# Patient Record
Sex: MALE | Race: BLACK OR AFRICAN AMERICAN | NOT HISPANIC OR LATINO
[De-identification: names, ages, dates, MRNs, and addresses within clinical notes are randomized per-mention and may not be internally consistent; named-entity substitution may affect disease eponyms.]

---

## 2018-03-09 ENCOUNTER — APPOINTMENT (OUTPATIENT)
Dept: CT IMAGING | Facility: IMAGING CENTER | Age: 58
End: 2018-03-09
Payer: COMMERCIAL

## 2018-03-09 ENCOUNTER — OUTPATIENT (OUTPATIENT)
Dept: OUTPATIENT SERVICES | Facility: HOSPITAL | Age: 58
LOS: 1 days | End: 2018-03-09
Payer: COMMERCIAL

## 2018-03-09 DIAGNOSIS — Z00.8 ENCOUNTER FOR OTHER GENERAL EXAMINATION: ICD-10-CM

## 2018-03-09 PROCEDURE — 73200 CT UPPER EXTREMITY W/O DYE: CPT

## 2018-03-09 PROCEDURE — 73200 CT UPPER EXTREMITY W/O DYE: CPT | Mod: 26,LT

## 2020-03-30 ENCOUNTER — INPATIENT (INPATIENT)
Facility: HOSPITAL | Age: 60
LOS: 15 days | Discharge: HOME HEALTH | End: 2020-04-15
Attending: INTERNAL MEDICINE | Admitting: INTERNAL MEDICINE
Payer: COMMERCIAL

## 2020-03-30 VITALS
HEART RATE: 98 BPM | RESPIRATION RATE: 18 BRPM | OXYGEN SATURATION: 98 % | SYSTOLIC BLOOD PRESSURE: 152 MMHG | DIASTOLIC BLOOD PRESSURE: 102 MMHG | TEMPERATURE: 101 F

## 2020-03-30 NOTE — ED ADULT TRIAGE NOTE - CHIEF COMPLAINT QUOTE
Pt arrives to ED via EMS.  Pt arrives on non rebreather on 15L Oxygen by EMS.  Pt moved to nasal cannula in ED and maintaining 95%.  Pt c/o cough and fever.  pt was tested for covid at [facility] and is awaiting results.  Pt called 911 due to SOB.

## 2020-03-31 DIAGNOSIS — B34.9 VIRAL INFECTION, UNSPECIFIED: ICD-10-CM

## 2020-03-31 LAB
ALBUMIN SERPL ELPH-MCNC: 3.6 G/DL — SIGNIFICANT CHANGE UP (ref 3.3–5)
ALP SERPL-CCNC: 34 U/L — LOW (ref 40–120)
ALT FLD-CCNC: 33 U/L — SIGNIFICANT CHANGE UP (ref 4–41)
ANION GAP SERPL CALC-SCNC: 14 MMO/L — SIGNIFICANT CHANGE UP (ref 7–14)
APTT BLD: 30.3 SEC — SIGNIFICANT CHANGE UP (ref 27.5–36.3)
AST SERPL-CCNC: 42 U/L — HIGH (ref 4–40)
BASOPHILS # BLD AUTO: 0.02 K/UL — SIGNIFICANT CHANGE UP (ref 0–0.2)
BASOPHILS NFR BLD AUTO: 0.3 % — SIGNIFICANT CHANGE UP (ref 0–2)
BILIRUB SERPL-MCNC: 0.4 MG/DL — SIGNIFICANT CHANGE UP (ref 0.2–1.2)
BUN SERPL-MCNC: 20 MG/DL — SIGNIFICANT CHANGE UP (ref 7–23)
CALCIUM SERPL-MCNC: 8.3 MG/DL — LOW (ref 8.4–10.5)
CHLORIDE SERPL-SCNC: 91 MMOL/L — LOW (ref 98–107)
CO2 SERPL-SCNC: 27 MMOL/L — SIGNIFICANT CHANGE UP (ref 22–31)
CREAT SERPL-MCNC: 1.57 MG/DL — HIGH (ref 0.5–1.3)
CRP SERPL-MCNC: 157.2 MG/L — HIGH
EOSINOPHIL # BLD AUTO: 0 K/UL — SIGNIFICANT CHANGE UP (ref 0–0.5)
EOSINOPHIL NFR BLD AUTO: 0 % — SIGNIFICANT CHANGE UP (ref 0–6)
FERRITIN SERPL-MCNC: 306.9 NG/ML — SIGNIFICANT CHANGE UP (ref 30–400)
GLUCOSE BLDC GLUCOMTR-MCNC: 100 MG/DL — HIGH (ref 70–99)
GLUCOSE BLDC GLUCOMTR-MCNC: 125 MG/DL — HIGH (ref 70–99)
GLUCOSE BLDC GLUCOMTR-MCNC: 132 MG/DL — HIGH (ref 70–99)
GLUCOSE SERPL-MCNC: 156 MG/DL — HIGH (ref 70–99)
HCT VFR BLD CALC: 39.7 % — SIGNIFICANT CHANGE UP (ref 39–50)
HGB BLD-MCNC: 13.1 G/DL — SIGNIFICANT CHANGE UP (ref 13–17)
IMM GRANULOCYTES NFR BLD AUTO: 0.8 % — SIGNIFICANT CHANGE UP (ref 0–1.5)
INR BLD: 1.04 — SIGNIFICANT CHANGE UP (ref 0.88–1.17)
LYMPHOCYTES # BLD AUTO: 0.61 K/UL — LOW (ref 1–3.3)
LYMPHOCYTES # BLD AUTO: 7.6 % — LOW (ref 13–44)
MCHC RBC-ENTMCNC: 26.7 PG — LOW (ref 27–34)
MCHC RBC-ENTMCNC: 33 % — SIGNIFICANT CHANGE UP (ref 32–36)
MCV RBC AUTO: 80.9 FL — SIGNIFICANT CHANGE UP (ref 80–100)
MONOCYTES # BLD AUTO: 0.54 K/UL — SIGNIFICANT CHANGE UP (ref 0–0.9)
MONOCYTES NFR BLD AUTO: 6.8 % — SIGNIFICANT CHANGE UP (ref 2–14)
NEUTROPHILS # BLD AUTO: 6.75 K/UL — SIGNIFICANT CHANGE UP (ref 1.8–7.4)
NEUTROPHILS NFR BLD AUTO: 84.5 % — HIGH (ref 43–77)
NRBC # FLD: 0 K/UL — SIGNIFICANT CHANGE UP (ref 0–0)
PLATELET # BLD AUTO: 353 K/UL — SIGNIFICANT CHANGE UP (ref 150–400)
PMV BLD: 10 FL — SIGNIFICANT CHANGE UP (ref 7–13)
POTASSIUM SERPL-MCNC: 3.8 MMOL/L — SIGNIFICANT CHANGE UP (ref 3.5–5.3)
POTASSIUM SERPL-SCNC: 3.8 MMOL/L — SIGNIFICANT CHANGE UP (ref 3.5–5.3)
PROT SERPL-MCNC: 7.2 G/DL — SIGNIFICANT CHANGE UP (ref 6–8.3)
PROTHROM AB SERPL-ACNC: 11.9 SEC — SIGNIFICANT CHANGE UP (ref 9.8–13.1)
RBC # BLD: 4.91 M/UL — SIGNIFICANT CHANGE UP (ref 4.2–5.8)
RBC # FLD: 14.3 % — SIGNIFICANT CHANGE UP (ref 10.3–14.5)
SARS-COV-2 RNA SPEC QL NAA+PROBE: DETECTED
SODIUM SERPL-SCNC: 132 MMOL/L — LOW (ref 135–145)
TROPONIN T, HIGH SENSITIVITY: 31 NG/L — SIGNIFICANT CHANGE UP (ref ?–14)
WBC # BLD: 7.98 K/UL — SIGNIFICANT CHANGE UP (ref 3.8–10.5)
WBC # FLD AUTO: 7.98 K/UL — SIGNIFICANT CHANGE UP (ref 3.8–10.5)

## 2020-03-31 PROCEDURE — 71045 X-RAY EXAM CHEST 1 VIEW: CPT | Mod: 26

## 2020-03-31 PROCEDURE — 93010 ELECTROCARDIOGRAM REPORT: CPT

## 2020-03-31 RX ORDER — AMLODIPINE BESYLATE 2.5 MG/1
5 TABLET ORAL DAILY
Refills: 0 | Status: DISCONTINUED | OUTPATIENT
Start: 2020-03-31 | End: 2020-04-01

## 2020-03-31 RX ORDER — DEXTROSE 50 % IN WATER 50 %
12.5 SYRINGE (ML) INTRAVENOUS ONCE
Refills: 0 | Status: DISCONTINUED | OUTPATIENT
Start: 2020-03-31 | End: 2020-04-15

## 2020-03-31 RX ORDER — ASCORBIC ACID 60 MG
1500 TABLET,CHEWABLE ORAL EVERY 6 HOURS
Refills: 0 | Status: COMPLETED | OUTPATIENT
Start: 2020-03-31 | End: 2020-04-04

## 2020-03-31 RX ORDER — AZITHROMYCIN 500 MG/1
500 TABLET, FILM COATED ORAL ONCE
Refills: 0 | Status: COMPLETED | OUTPATIENT
Start: 2020-03-31 | End: 2020-03-31

## 2020-03-31 RX ORDER — ACETAMINOPHEN 500 MG
650 TABLET ORAL EVERY 6 HOURS
Refills: 0 | Status: DISCONTINUED | OUTPATIENT
Start: 2020-03-31 | End: 2020-04-06

## 2020-03-31 RX ORDER — DEXTROSE 50 % IN WATER 50 %
15 SYRINGE (ML) INTRAVENOUS ONCE
Refills: 0 | Status: DISCONTINUED | OUTPATIENT
Start: 2020-03-31 | End: 2020-04-15

## 2020-03-31 RX ORDER — HYDROXYCHLOROQUINE SULFATE 200 MG
400 TABLET ORAL EVERY 12 HOURS
Refills: 0 | Status: COMPLETED | OUTPATIENT
Start: 2020-03-31 | End: 2020-03-31

## 2020-03-31 RX ORDER — GLUCAGON INJECTION, SOLUTION 0.5 MG/.1ML
1 INJECTION, SOLUTION SUBCUTANEOUS ONCE
Refills: 0 | Status: DISCONTINUED | OUTPATIENT
Start: 2020-03-31 | End: 2020-04-15

## 2020-03-31 RX ORDER — ALBUTEROL 90 UG/1
1 AEROSOL, METERED ORAL EVERY 4 HOURS
Refills: 0 | Status: DISCONTINUED | OUTPATIENT
Start: 2020-03-31 | End: 2020-04-15

## 2020-03-31 RX ORDER — DEXTROSE 50 % IN WATER 50 %
25 SYRINGE (ML) INTRAVENOUS ONCE
Refills: 0 | Status: DISCONTINUED | OUTPATIENT
Start: 2020-03-31 | End: 2020-04-15

## 2020-03-31 RX ORDER — SODIUM CHLORIDE 9 MG/ML
1000 INJECTION, SOLUTION INTRAVENOUS
Refills: 0 | Status: DISCONTINUED | OUTPATIENT
Start: 2020-03-31 | End: 2020-04-15

## 2020-03-31 RX ORDER — ASCORBIC ACID 60 MG
1500 TABLET,CHEWABLE ORAL
Refills: 0 | Status: DISCONTINUED | OUTPATIENT
Start: 2020-03-31 | End: 2020-03-31

## 2020-03-31 RX ORDER — INSULIN LISPRO 100/ML
VIAL (ML) SUBCUTANEOUS
Refills: 0 | Status: DISCONTINUED | OUTPATIENT
Start: 2020-03-31 | End: 2020-04-15

## 2020-03-31 RX ORDER — AZITHROMYCIN 500 MG/1
500 TABLET, FILM COATED ORAL ONCE
Refills: 0 | Status: DISCONTINUED | OUTPATIENT
Start: 2020-03-31 | End: 2020-03-31

## 2020-03-31 RX ORDER — INFLUENZA VIRUS VACCINE 15; 15; 15; 15 UG/.5ML; UG/.5ML; UG/.5ML; UG/.5ML
0.5 SUSPENSION INTRAMUSCULAR ONCE
Refills: 0 | Status: DISCONTINUED | OUTPATIENT
Start: 2020-03-31 | End: 2020-04-15

## 2020-03-31 RX ORDER — HEPARIN SODIUM 5000 [USP'U]/ML
5000 INJECTION INTRAVENOUS; SUBCUTANEOUS EVERY 12 HOURS
Refills: 0 | Status: DISCONTINUED | OUTPATIENT
Start: 2020-03-31 | End: 2020-04-03

## 2020-03-31 RX ORDER — ACETAMINOPHEN 500 MG
650 TABLET ORAL ONCE
Refills: 0 | Status: COMPLETED | OUTPATIENT
Start: 2020-03-31 | End: 2020-03-31

## 2020-03-31 RX ORDER — HYDROXYCHLOROQUINE SULFATE 200 MG
200 TABLET ORAL EVERY 12 HOURS
Refills: 0 | Status: COMPLETED | OUTPATIENT
Start: 2020-04-01 | End: 2020-04-04

## 2020-03-31 RX ORDER — HYDROXYCHLOROQUINE SULFATE 200 MG
TABLET ORAL
Refills: 0 | Status: COMPLETED | OUTPATIENT
Start: 2020-03-31 | End: 2020-04-04

## 2020-03-31 RX ORDER — THIAMINE MONONITRATE (VIT B1) 100 MG
200 TABLET ORAL
Refills: 0 | Status: COMPLETED | OUTPATIENT
Start: 2020-03-31 | End: 2020-04-04

## 2020-03-31 RX ORDER — THIAMINE MONONITRATE (VIT B1) 100 MG
200 TABLET ORAL DAILY
Refills: 0 | Status: DISCONTINUED | OUTPATIENT
Start: 2020-03-31 | End: 2020-03-31

## 2020-03-31 RX ADMIN — Medication 103 MILLIGRAM(S): at 23:10

## 2020-03-31 RX ADMIN — Medication 400 MILLIGRAM(S): at 11:03

## 2020-03-31 RX ADMIN — Medication 200 MILLIGRAM(S): at 18:52

## 2020-03-31 RX ADMIN — Medication 400 MILLIGRAM(S): at 23:11

## 2020-03-31 RX ADMIN — HEPARIN SODIUM 5000 UNIT(S): 5000 INJECTION INTRAVENOUS; SUBCUTANEOUS at 18:52

## 2020-03-31 RX ADMIN — Medication 103 MILLIGRAM(S): at 12:51

## 2020-03-31 RX ADMIN — AZITHROMYCIN 500 MILLIGRAM(S): 500 TABLET, FILM COATED ORAL at 12:51

## 2020-03-31 RX ADMIN — AMLODIPINE BESYLATE 5 MILLIGRAM(S): 2.5 TABLET ORAL at 11:03

## 2020-03-31 RX ADMIN — Medication 103 MILLIGRAM(S): at 18:52

## 2020-03-31 RX ADMIN — Medication 0: at 11:52

## 2020-03-31 RX ADMIN — Medication 650 MILLIGRAM(S): at 01:35

## 2020-03-31 NOTE — ED ADULT NURSE REASSESSMENT NOTE - NS ED NURSE REASSESS COMMENT FT1
Received report from day shift RN Naomi. received Pt in room 26. pt A&OX3, pt laying in stretcher comfortably. Received Pt on 15L nonrebreather, pt tolerating nonrebreather well at this time, sating 100%. respirations appear equal and nonlabored, no respiratory distress noted at this time. vitals stable as noted. pt afebrile orally at this time. Denies any pain, sob, cough, dizziness at this time. 20 gauge iv noted to the right ac, flushing well. pt stable, will reassess and continue to monitor

## 2020-03-31 NOTE — ED PROVIDER NOTE - NS ED ROS FT
ROS:  GENERAL: +fever   EYES: no change in vision  HEENT: no trouble swallowing, no trouble speaking  CARDIAC: no chest pain  PULMONARY: +cough and SOB   GI: no abdominal pain, no nausea, no vomiting, no diarrhea, no constipation  : No dysuria, no frequency, no change in appearance, or odor of urine  SKIN: no rashes  NEURO: no headache, no weakness  MSK: No joint pain    Emmett Galarza PGY2

## 2020-03-31 NOTE — ED PROVIDER NOTE - CLINICAL SUMMARY MEDICAL DECISION MAKING FREE TEXT BOX
Fever, cough, SOB. Hypoxia to high 80s on RA improved to high 90s on 5L NC. Symptoms c/w COVID PNA. Labs, XR, & admit.

## 2020-03-31 NOTE — H&P ADULT - HISTORY OF PRESENT ILLNESS
59 y /o Male  with PMH of HTN, HLD, and DM p/w fever and fatigue x 1 week  . Developed cough and worsening SOB over the past few days,   . Denies any chest pain, abd pain, N/V/D. Went to an urgent care where he was tested for COVID but has not gotten the results.   On arrival to the ER, hypoxic to high 80s on RA improved w/n/c o2

## 2020-03-31 NOTE — ED PROVIDER NOTE - ATTENDING CONTRIBUTION TO CARE
I, Jennifer Cabot, MD, have performed a history and physical exam of the patient and discussed their management with the resident. I reviewed the resident's note and agree with the documented findings and plan of care. My medical decision making and observations are found above.    Cabot: 59M with 1 week of F/C/dry cough/SOB/HA/NBNB diarrhea.  On exam, febrile, tachycardic, not tachypneic, hypoxic on RA, satting 95% on 6L NC, NAD, MMM, eyes clear, lungs CTAB, heart sounds normal, abd soft, NT, ND, no CVAT, LEs without edema, wwp, skin normal temperature and color, neuro: alert and oriented, no focal deficits, symmetric facial movements, moves all extremities.  Likely COVID-19.  Will send COVID labs, check CXR, treat sx, admit.

## 2020-03-31 NOTE — ED PROVIDER NOTE - OBJECTIVE STATEMENT
59M with PMH of HTN, HLD, and DM p/w fever and fatigue x 1 week. Developed cough and worsening SOB over the past few days, which prompted him to come to the ER. Denies any chest pain, abd pain, N/V/D. Went to an urgent care where he was tested for COVID but has not gotten the results. On arrival to the ER, hypoxic to high 80s on RA improved to high 90s on 5L NC.

## 2020-03-31 NOTE — H&P ADULT - ASSESSMENT
pt w/ symptoms c/w covid  await pcr  start tx for now  ecg daily  dvt proph  dm  fsg riss  hold oral meds  htn  cant remember names  will start amlodipine for now  o2  albuterol mdi  f/u inflammatory markers

## 2020-03-31 NOTE — PATIENT PROFILE ADULT - WORK SAFETY PLAN
Pt feels unsafe with covid outbreak at work, patient will improve hand hygiene and infection control practices

## 2020-03-31 NOTE — H&P ADULT - NSHPLABSRESULTS_GEN_ALL_CORE
13.1   7.98  )-----------( 353      ( 31 Mar 2020 04:53 )             39.7       03-31    132<L>  |  91<L>  |  20  ----------------------------<  156<H>  3.8   |  27  |  1.57<H>    Ca    8.3<L>      31 Mar 2020 03:40    TPro  7.2  /  Alb  3.6  /  TBili  0.4  /  DBili  x   /  AST  42<H>  /  ALT  33  /  AlkPhos  34<L>  03-31                  PT/INR - ( 31 Mar 2020 03:40 )   PT: 11.9 SEC;   INR: 1.04          PTT - ( 31 Mar 2020 03:40 )  PTT:30.3 SEC    Lactate Trend            CAPILLARY BLOOD GLUCOSE

## 2020-03-31 NOTE — ED PROVIDER NOTE - PHYSICAL EXAMINATION
Gen: AAOx3, non-toxic  Head: NCAT  HEENT: EOMI, oral mucosa moist, normal conjunctiva  Lung: CTAB, no respiratory distress, no wheezes/rhonchi/rales B/L, speaking in full sentences  CV: RRR, no murmurs, rubs or gallops  Abd: soft, NTND, no guarding  MSK: no visible deformities  Neuro: No focal sensory or motor deficits  Skin: Warm, well perfused, no rash  Psych: normal affect.     ~Emmett Galarza PGY2

## 2020-03-31 NOTE — ED ADULT NURSE NOTE - OBJECTIVE STATEMENT
Pt received in room 26, a/o x4. Pt comes in for c/o fever/SOB for the last 5 days. Pt reports that he trying to keep his fever down and help himself using Tylenol. Tmax 103 at home and pt O2sat after arrival to the exam room was between 89-91% on 5-6Lnc. Pt then placed on non-rebreather with much improvement. Pt will continue to be monitored, pt waiting further MD evaluation. IV acces placed to LAC 20, labs sent per order.

## 2020-03-31 NOTE — ED ADULT NURSE REASSESSMENT NOTE - NS ED NURSE REASSESS COMMENT FT1
pt moved from bed to stretcher, on 15L nonrebreather. Pt de sated to 86%. reports increasing shortness of breath. Pt vitals as noted. Pt remains on 15L non rebreather. Pt now sating 93%. Appears to be more comfortable and stable. no respiratory distress noted at this time. Rosana HARTMAN made aware. as per PA pt okay to go to floor. Pt appears stable and in no apparent distress at this time. Pt sitting up awake and talking. Stable at this time, pt being transported to floor.

## 2020-04-01 DIAGNOSIS — B34.9 VIRAL INFECTION, UNSPECIFIED: ICD-10-CM

## 2020-04-01 DIAGNOSIS — E11.9 TYPE 2 DIABETES MELLITUS WITHOUT COMPLICATIONS: ICD-10-CM

## 2020-04-01 DIAGNOSIS — I10 ESSENTIAL (PRIMARY) HYPERTENSION: ICD-10-CM

## 2020-04-01 LAB
ALBUMIN SERPL ELPH-MCNC: 2.8 G/DL — LOW (ref 3.3–5)
ALP SERPL-CCNC: 38 U/L — LOW (ref 40–120)
ALT FLD-CCNC: 47 U/L — HIGH (ref 4–41)
ANION GAP SERPL CALC-SCNC: 15 MMO/L — HIGH (ref 7–14)
AST SERPL-CCNC: 60 U/L — HIGH (ref 4–40)
BILIRUB SERPL-MCNC: 0.7 MG/DL — SIGNIFICANT CHANGE UP (ref 0.2–1.2)
BUN SERPL-MCNC: 20 MG/DL — SIGNIFICANT CHANGE UP (ref 7–23)
CALCIUM SERPL-MCNC: 8.9 MG/DL — SIGNIFICANT CHANGE UP (ref 8.4–10.5)
CHLORIDE SERPL-SCNC: 95 MMOL/L — LOW (ref 98–107)
CO2 SERPL-SCNC: 26 MMOL/L — SIGNIFICANT CHANGE UP (ref 22–31)
CREAT SERPL-MCNC: 1.24 MG/DL — SIGNIFICANT CHANGE UP (ref 0.5–1.3)
GLUCOSE BLDC GLUCOMTR-MCNC: 150 MG/DL — HIGH (ref 70–99)
GLUCOSE BLDC GLUCOMTR-MCNC: 199 MG/DL — HIGH (ref 70–99)
GLUCOSE BLDC GLUCOMTR-MCNC: 246 MG/DL — HIGH (ref 70–99)
GLUCOSE BLDC GLUCOMTR-MCNC: 250 MG/DL — HIGH (ref 70–99)
GLUCOSE SERPL-MCNC: 146 MG/DL — HIGH (ref 70–99)
HBA1C BLD-MCNC: 8.3 % — HIGH (ref 4–5.6)
HCT VFR BLD CALC: 39.6 % — SIGNIFICANT CHANGE UP (ref 39–50)
HCV AB S/CO SERPL IA: 0.16 S/CO — SIGNIFICANT CHANGE UP (ref 0–0.99)
HCV AB SERPL-IMP: SIGNIFICANT CHANGE UP
HGB BLD-MCNC: 13 G/DL — SIGNIFICANT CHANGE UP (ref 13–17)
MAGNESIUM SERPL-MCNC: 2.1 MG/DL — SIGNIFICANT CHANGE UP (ref 1.6–2.6)
MCHC RBC-ENTMCNC: 26.9 PG — LOW (ref 27–34)
MCHC RBC-ENTMCNC: 32.8 % — SIGNIFICANT CHANGE UP (ref 32–36)
MCV RBC AUTO: 81.8 FL — SIGNIFICANT CHANGE UP (ref 80–100)
NRBC # FLD: 0 K/UL — SIGNIFICANT CHANGE UP (ref 0–0)
PLATELET # BLD AUTO: 392 K/UL — SIGNIFICANT CHANGE UP (ref 150–400)
PMV BLD: 10.2 FL — SIGNIFICANT CHANGE UP (ref 7–13)
POTASSIUM SERPL-MCNC: 3.9 MMOL/L — SIGNIFICANT CHANGE UP (ref 3.5–5.3)
POTASSIUM SERPL-SCNC: 3.9 MMOL/L — SIGNIFICANT CHANGE UP (ref 3.5–5.3)
PROT SERPL-MCNC: 7.4 G/DL — SIGNIFICANT CHANGE UP (ref 6–8.3)
RBC # BLD: 4.84 M/UL — SIGNIFICANT CHANGE UP (ref 4.2–5.8)
RBC # FLD: 14.5 % — SIGNIFICANT CHANGE UP (ref 10.3–14.5)
SODIUM SERPL-SCNC: 136 MMOL/L — SIGNIFICANT CHANGE UP (ref 135–145)
WBC # BLD: 9.03 K/UL — SIGNIFICANT CHANGE UP (ref 3.8–10.5)
WBC # FLD AUTO: 9.03 K/UL — SIGNIFICANT CHANGE UP (ref 3.8–10.5)

## 2020-04-01 PROCEDURE — 93010 ELECTROCARDIOGRAM REPORT: CPT

## 2020-04-01 RX ORDER — HYDROCHLOROTHIAZIDE 25 MG
12.5 TABLET ORAL DAILY
Refills: 0 | Status: DISCONTINUED | OUTPATIENT
Start: 2020-04-01 | End: 2020-04-02

## 2020-04-01 RX ORDER — LOSARTAN POTASSIUM 100 MG/1
100 TABLET, FILM COATED ORAL DAILY
Refills: 0 | Status: DISCONTINUED | OUTPATIENT
Start: 2020-04-01 | End: 2020-04-08

## 2020-04-01 RX ORDER — AMLODIPINE BESYLATE 2.5 MG/1
5 TABLET ORAL ONCE
Refills: 0 | Status: COMPLETED | OUTPATIENT
Start: 2020-04-01 | End: 2020-04-01

## 2020-04-01 RX ORDER — ACETAMINOPHEN 500 MG
1000 TABLET ORAL ONCE
Refills: 0 | Status: COMPLETED | OUTPATIENT
Start: 2020-04-01 | End: 2020-04-01

## 2020-04-01 RX ORDER — AZITHROMYCIN 500 MG/1
TABLET, FILM COATED ORAL
Refills: 0 | Status: DISCONTINUED | OUTPATIENT
Start: 2020-04-01 | End: 2020-04-01

## 2020-04-01 RX ORDER — METOPROLOL TARTRATE 50 MG
25 TABLET ORAL ONCE
Refills: 0 | Status: COMPLETED | OUTPATIENT
Start: 2020-04-01 | End: 2020-04-01

## 2020-04-01 RX ORDER — AMLODIPINE BESYLATE 2.5 MG/1
10 TABLET ORAL DAILY
Refills: 0 | Status: DISCONTINUED | OUTPATIENT
Start: 2020-04-02 | End: 2020-04-15

## 2020-04-01 RX ORDER — AZITHROMYCIN 500 MG/1
500 TABLET, FILM COATED ORAL EVERY 24 HOURS
Refills: 0 | Status: DISCONTINUED | OUTPATIENT
Start: 2020-04-01 | End: 2020-04-04

## 2020-04-01 RX ORDER — ANAKINRA 100MG/0.67
100 SYRINGE (ML) SUBCUTANEOUS
Refills: 0 | Status: COMPLETED | OUTPATIENT
Start: 2020-04-01 | End: 2020-04-04

## 2020-04-01 RX ADMIN — HEPARIN SODIUM 5000 UNIT(S): 5000 INJECTION INTRAVENOUS; SUBCUTANEOUS at 17:24

## 2020-04-01 RX ADMIN — Medication 25 MILLIGRAM(S): at 13:15

## 2020-04-01 RX ADMIN — HEPARIN SODIUM 5000 UNIT(S): 5000 INJECTION INTRAVENOUS; SUBCUTANEOUS at 05:54

## 2020-04-01 RX ADMIN — Medication 12.5 MILLIGRAM(S): at 00:13

## 2020-04-01 RX ADMIN — LOSARTAN POTASSIUM 100 MILLIGRAM(S): 100 TABLET, FILM COATED ORAL at 00:13

## 2020-04-01 RX ADMIN — Medication 103 MILLIGRAM(S): at 05:54

## 2020-04-01 RX ADMIN — Medication 200 MILLIGRAM(S): at 12:45

## 2020-04-01 RX ADMIN — Medication 103 MILLIGRAM(S): at 16:29

## 2020-04-01 RX ADMIN — Medication 650 MILLIGRAM(S): at 07:25

## 2020-04-01 RX ADMIN — Medication 103 MILLIGRAM(S): at 12:45

## 2020-04-01 RX ADMIN — Medication 200 MILLIGRAM(S): at 16:28

## 2020-04-01 RX ADMIN — Medication 100 MILLIGRAM(S): at 16:28

## 2020-04-01 RX ADMIN — AMLODIPINE BESYLATE 5 MILLIGRAM(S): 2.5 TABLET ORAL at 16:28

## 2020-04-01 RX ADMIN — AMLODIPINE BESYLATE 5 MILLIGRAM(S): 2.5 TABLET ORAL at 05:54

## 2020-04-01 RX ADMIN — Medication 60 MILLIGRAM(S): at 13:26

## 2020-04-01 RX ADMIN — AZITHROMYCIN 500 MILLIGRAM(S): 500 TABLET, FILM COATED ORAL at 17:23

## 2020-04-01 RX ADMIN — Medication 2: at 12:35

## 2020-04-01 RX ADMIN — Medication 200 MILLIGRAM(S): at 05:54

## 2020-04-01 RX ADMIN — Medication 4: at 17:24

## 2020-04-01 RX ADMIN — Medication 400 MILLIGRAM(S): at 15:27

## 2020-04-01 RX ADMIN — Medication 100 MILLIGRAM(S): at 20:21

## 2020-04-01 NOTE — CONSULT NOTE ADULT - ATTENDING COMMENTS
agree with the above assessment and plan by LUCY Persaud.  59 y /o Male  with PMH of HTN, HLD, and DM p/w fever and fatigue x 1 week found to be COVID + as of 3/31/20  pt with progressive hypoxia  RRT called, pt now on NRB in prone position  ECG reveals NSR w normal QTc  No objection to Hydrochrloroquine/Azithromycin use  adjust BP meds as needed

## 2020-04-01 NOTE — PROGRESS NOTE ADULT - SUBJECTIVE AND OBJECTIVE BOX
ACp team PA Note     was called to evaluate for chest pain, Pt c/o low back since 2 days ago. Pt denies any CP/SOB/dizziness/diaphoresis/other complaints,   -- VSS, BP-- 181/100-- pt is on Norvasc, restarted home meds: Cozaar 100 mg and HCTZ 12.5 mg,   -- ECG- NSR- at 93 bpm, TWI- V6, 1 mm-- ST elev-- V5, -- pt is chest pain free -    -- Tylenol for pain PRN,   -- continue monitoring   -- discussed with nursing staff

## 2020-04-01 NOTE — RAPID RESPONSE TEAM SUMMARY - NSSITUATIONBACKGROUNDRRT_GEN_ALL_CORE
59 y /o Male  with PMH of HTN, HLD, and DM p/w fever and fatigue x 1 week found to be COVID+ as of 3/31/20. RRT called for hypoxia to low 80s. Upon arrival to RRT, patient proned, on NRB+NC 6L w/ saturation 83-85%. Persistently hypertensive to 170s/100s, given 10 mg amlodipine. Also given 0.5 mg ativan IV for anxiety. Patient placed on left lateral decubitus/prone position w/ resultant saturation 88%. RRT ended, but will continue to follow.

## 2020-04-01 NOTE — CONSULT NOTE ADULT - PROBLEM SELECTOR RECOMMENDATION 9
CORONA VIRUS INFECTION:  with hypoxic resp failure: keep o2 sao2 above 905: crp is very high : ? start anakinra: CORONA VIRUS INFECTION:  with hypoxic resp failure: keep o2 sao2 above 90%: crp is very high : ? start anakinra: his steroids were started today : but given his poor clinical condition with  very high oxygen requirement: and pt lying prone: with no evidence of active bacterial infection and pretty high crp: would initiate Anakinra: kuldeep HARTMAN as wellas pharmacy to intitiate;

## 2020-04-01 NOTE — PROGRESS NOTE ADULT - ASSESSMENT
pt w/ symptoms c/w covid  c/w tx  ? trial  id to see  pulm to see   if not candidate start steroids /+- anakinra   ecg daily  dvt proph  dm  fsg riss  hold oral meds  htn  c/w meds   o2/nrb now   albuterol mdi  f/u inflammatory markers

## 2020-04-01 NOTE — PROGRESS NOTE ADULT - SUBJECTIVE AND OBJECTIVE BOX
CHIEF COMPLAINT:Patient is a 59y old  Male who presents with a chief complaint of COVID + (01 Apr 2020 00:06)    	        PAST MEDICAL & SURGICAL HISTORY:  HLD (hyperlipidemia)  Diabetes mellitus  HTN (hypertension)          REVIEW OF SYSTEMS:  weak  RESPIRATORY: sob/ cough  CARDIOVASCULAR: had cp   GASTROINTESTINAL: No abdominal or epigastric pain. No nausea, vomiting, or hematemesis; No diarrhea or constipation. No melena or hematochezia.  GENITOURINARY: No dysuria, frequency, hematuria, or incontinence  NEUROLOGICAL: No headaches, memory loss, loss of strength, numbness, or tremors      Medications:  MEDICATIONS  (STANDING):  ALBUTerol    90 MICROgram(s) HFA Inhaler 1 Puff(s) Inhalation every 4 hours  amLODIPine   Tablet 5 milliGRAM(s) Oral daily  ascorbic acid IVPB 1500 milliGRAM(s) IV Intermittent every 6 hours  dextrose 5%. 1000 milliLiter(s) (50 mL/Hr) IV Continuous <Continuous>  dextrose 50% Injectable 12.5 Gram(s) IV Push once  dextrose 50% Injectable 25 Gram(s) IV Push once  dextrose 50% Injectable 25 Gram(s) IV Push once  heparin  Injectable 5000 Unit(s) SubCutaneous every 12 hours  hydrochlorothiazide 12.5 milliGRAM(s) Oral daily  hydroxychloroquine 200 milliGRAM(s) Oral every 12 hours  hydroxychloroquine   Oral   influenza   Vaccine 0.5 milliLiter(s) IntraMuscular once  insulin lispro (HumaLOG) corrective regimen sliding scale   SubCutaneous three times a day before meals  losartan 100 milliGRAM(s) Oral daily  thiamine 200 milliGRAM(s) Oral <User Schedule>    MEDICATIONS  (PRN):  acetaminophen   Tablet .. 650 milliGRAM(s) Oral every 6 hours PRN Temp greater or equal to 38.5C (101.3F)  dextrose 40% Gel 15 Gram(s) Oral once PRN Blood Glucose LESS THAN 70 milliGRAM(s)/deciliter  glucagon  Injectable 1 milliGRAM(s) IntraMuscular once PRN Glucose LESS THAN 70 milligrams/deciliter    	    PHYSICAL EXAM:  T(C): 37.7 (04-01-20 @ 08:35), Max: 38.7 (04-01-20 @ 07:24)  HR: 62 (04-01-20 @ 07:24) (62 - 96)  BP: 146/87 (04-01-20 @ 07:24) (146/87 - 181/100)  RR: 22 (04-01-20 @ 07:24) (18 - 22)  SpO2: 97% (04-01-20 @ 07:24) (93% - 100%)  Wt(kg): --  I&O's Summary      Appearance: Normal	  HEENT:   Normal oral mucosa, PERRL, EOMI	  Lymphatic: No lymphadenopathy  Cardiovascular: Normal S1 S2, No JVD, No murmurs, No edema  Respiratory: dec bs   Psychiatry: A & O x 3,  Gastrointestinal:  Soft, Non-tender, + BS	  Skin: No rashes, No ecchymoses, No cyanosis	  Neurologic: Non-focal  Extremities: Normal range of motion, No clubbing, cyanosis or edema  Vascular: Peripheral pulses palpable 2+ bilaterally    TELEMETRY: 	    ECG:  	  RADIOLOGY:  OTHER: 	  	  LABS:	 	    CARDIAC MARKERS:                                13.0   9.03  )-----------( 392      ( 01 Apr 2020 05:45 )             39.6     04-01    136  |  95<L>  |  20  ----------------------------<  146<H>  3.9   |  26  |  1.24    Ca    8.9      01 Apr 2020 05:45  Mg     2.1     04-01    TPro  7.4  /  Alb  2.8<L>  /  TBili  0.7  /  DBili  x   /  AST  60<H>  /  ALT  47<H>  /  AlkPhos  38<L>  04-01    proBNP:   Lipid Profile:   HgA1c: Hemoglobin A1C, Whole Blood: 8.3 % (04-01 @ 05:45)    TSH:

## 2020-04-01 NOTE — CONSULT NOTE ADULT - SUBJECTIVE AND OBJECTIVE BOX
CARDIOLOGY CONSULT - Dr. Piña     CHIEF COMPLAINT: fever, weakness, sob    HPI:  59 y /o Male  with PMH of HTN, HLD, and DM p/w fever and fatigue x 1 week found to be COVID + as of 3/31/20. As per h&p, pt. developed cough and worsening SOB over the past few days, Denies any chest pain, abd pain, N/V/D.   On arrival to the ER, hypoxic to high 80s on RA improved w/n/c o2. At time of consult, pt. on non-rebreather and nasal canula, destating to 70s, unable to tolerate lying on stomach 2/2 to obesity. When in prone position, pt. increased O2 to 92.   Primary team at bedside, RN at bedside. Pt. awake alert, sitting up.       PAST MEDICAL & SURGICAL HISTORY:  HLD (hyperlipidemia)  Diabetes mellitus  HTN (hypertension)          PREVIOUS DIAGNOSTIC TESTING:    [ ] Echocardiogram:   [ ]  Catheterization:   [ ] Stress Test:  	    MEDICATIONS:  MEDICATIONS  (STANDING):  ALBUTerol    90 MICROgram(s) HFA Inhaler 1 Puff(s) Inhalation every 4 hours  amLODIPine   Tablet 5 milliGRAM(s) Oral once  anakinra Injectable 100 milliGRAM(s) SubCutaneous <User Schedule>  ascorbic acid IVPB 1500 milliGRAM(s) IV Intermittent every 6 hours  dextrose 5%. 1000 milliLiter(s) (50 mL/Hr) IV Continuous <Continuous>  dextrose 50% Injectable 12.5 Gram(s) IV Push once  dextrose 50% Injectable 25 Gram(s) IV Push once  dextrose 50% Injectable 25 Gram(s) IV Push once  heparin  Injectable 5000 Unit(s) SubCutaneous every 12 hours  hydrochlorothiazide 12.5 milliGRAM(s) Oral daily  hydroxychloroquine 200 milliGRAM(s) Oral every 12 hours  hydroxychloroquine   Oral   influenza   Vaccine 0.5 milliLiter(s) IntraMuscular once  insulin lispro (HumaLOG) corrective regimen sliding scale   SubCutaneous three times a day before meals  losartan 100 milliGRAM(s) Oral daily  methylPREDNISolone sodium succinate Injectable 60 milliGRAM(s) IV Push two times a day  thiamine 200 milliGRAM(s) Oral <User Schedule>      FAMILY HISTORY:      SOCIAL HISTORY:    [ ] Non-smoker  [ ] Smoker  [ ] Alcohol    Allergies    No Known Allergies    Intolerances    	    REVIEW OF SYSTEMS:  CONSTITUTIONAL:+ fever, weight loss, + fatigue  EYES: No eye pain, visual disturbances, or discharge  ENMT:  No difficulty hearing, tinnitus, vertigo; No sinus or throat pain  NECK: No pain or stiffness  RESPIRATORY: =cough, wheezing, chills or hemoptysis; No Shortness of Breath  CARDIOVASCULAR: No chest pain, palpitations, passing out, dizziness, or leg swelling  GASTROINTESTINAL: No abdominal or epigastric pain. No nausea, vomiting, or hematemesis; No diarrhea or constipation. No melena or hematochezia.  GENITOURINARY: No dysuria, frequency, hematuria, or incontinence  NEUROLOGICAL: No headaches, memory loss, loss of strength, numbness, or tremors  SKIN: No itching, burning, rashes, or lesions   	    [X] All others negative	  [ ] Unable to obtain    PHYSICAL EXAM:  T(C): 37.4 (04-01-20 @ 12:45), Max: 38.7 (04-01-20 @ 07:24)  HR: 101 (04-01-20 @ 13:20) (62 - 101)  BP: 191/100 (04-01-20 @ 13:20) (146/87 - 191/100)  RR: 22 (04-01-20 @ 13:20) (18 - 22)  SpO2: 91% (04-01-20 @ 13:34) (85% - 99%)  Wt(kg): --  I&O's Summary      Appearance: Normal, obese 	  Psychiatry: A & O x 3, Mood & affect appropriate  HEENT:   Normal oral mucosa, PERRL, EOMI	  Lymphatic: No lymphadenopathy  Cardiovascular: Normal S1 S2,RRR, No JVD, No murmurs  Respiratory:  diminished 	  Gastrointestinal:  Soft, Non-tender, + BS	  Skin: No rashes, No ecchymoses, No cyanosis	  Neurologic: Non-focal  Extremities: Normal range of motion, No clubbing, cyanosis or edema  Vascular: Peripheral pulses palpable 2+ bilaterally    TELEMETRY: 	    ECG:  NSR, LAE , QTC wnl 	  RADIOLOGY: < from: Xray Chest 1 View- PORTABLE-Urgent (03.31.20 @ 02:54) >  FINDINGS:    Low lung volumes limiting evaluation. Patchy bilateral airspace opacities. No pneumothorax. The heart is enlarged.     IMPRESSION: Limited study but suspicion for opacities consistent with covid 19 infection.    < end of copied text >    OTHER: 	  	  LABS:	 	    CARDIAC MARKERS:                                  13.0   9.03  )-----------( 392      ( 01 Apr 2020 05:45 )             39.6     04-01    136  |  95<L>  |  20  ----------------------------<  146<H>  3.9   |  26  |  1.24    Ca    8.9      01 Apr 2020 05:45  Mg     2.1     04-01    TPro  7.4  /  Alb  2.8<L>  /  TBili  0.7  /  DBili  x   /  AST  60<H>  /  ALT  47<H>  /  AlkPhos  38<L>  04-01    PT/INR - ( 31 Mar 2020 03:40 )   PT: 11.9 SEC;   INR: 1.04          PTT - ( 31 Mar 2020 03:40 )  PTT:30.3 SEC  proBNP:   Lipid Profile:   HgA1c: Hemoglobin A1C, Whole Blood: 8.3 % (04-01 @ 05:45)    TSH: CARDIOLOGY CONSULT - Dr. Piña     CHIEF COMPLAINT: fever, weakness, sob    HPI:  59 y /o Male  with PMH of HTN, HLD, and DM p/w fever and fatigue x 1 week found to be COVID + as of 3/31/20. As per h&p, pt. developed cough and worsening SOB over the past few days, Denies any chest pain, abd pain, N/V/D.   On arrival to the ER, hypoxic to high 80s on RA improved w/n/c o2. At time of consult, pt. on non-rebreather and nasal canula, destating to 70s, unable to tolerate lying on stomach 2/2 to obesity. However when prone, pt. o2 sat rises above 90%, Primary team at bedside, RN at bedside. Pt. awake alert, sitting up at this time. RRT to be called if unable increase O2.       PAST MEDICAL & SURGICAL HISTORY:  HLD (hyperlipidemia)  Diabetes mellitus  HTN (hypertension)          PREVIOUS DIAGNOSTIC TESTING:    [ ] Echocardiogram:   [ ]  Catheterization:   [ ] Stress Test:  	    MEDICATIONS:  MEDICATIONS  (STANDING):  ALBUTerol    90 MICROgram(s) HFA Inhaler 1 Puff(s) Inhalation every 4 hours  amLODIPine   Tablet 5 milliGRAM(s) Oral once  anakinra Injectable 100 milliGRAM(s) SubCutaneous <User Schedule>  ascorbic acid IVPB 1500 milliGRAM(s) IV Intermittent every 6 hours  dextrose 5%. 1000 milliLiter(s) (50 mL/Hr) IV Continuous <Continuous>  dextrose 50% Injectable 12.5 Gram(s) IV Push once  dextrose 50% Injectable 25 Gram(s) IV Push once  dextrose 50% Injectable 25 Gram(s) IV Push once  heparin  Injectable 5000 Unit(s) SubCutaneous every 12 hours  hydrochlorothiazide 12.5 milliGRAM(s) Oral daily  hydroxychloroquine 200 milliGRAM(s) Oral every 12 hours  hydroxychloroquine   Oral   influenza   Vaccine 0.5 milliLiter(s) IntraMuscular once  insulin lispro (HumaLOG) corrective regimen sliding scale   SubCutaneous three times a day before meals  losartan 100 milliGRAM(s) Oral daily  methylPREDNISolone sodium succinate Injectable 60 milliGRAM(s) IV Push two times a day  thiamine 200 milliGRAM(s) Oral <User Schedule>      FAMILY HISTORY:      SOCIAL HISTORY:    [ ] Non-smoker  [ ] Smoker  [ ] Alcohol    Allergies    No Known Allergies    Intolerances    	    REVIEW OF SYSTEMS:  CONSTITUTIONAL:+ fever, weight loss, + fatigue  EYES: No eye pain, visual disturbances, or discharge  ENMT:  No difficulty hearing, tinnitus, vertigo; No sinus or throat pain  NECK: No pain or stiffness  RESPIRATORY: =cough, wheezing, chills or hemoptysis; No Shortness of Breath  CARDIOVASCULAR: No chest pain, palpitations, passing out, dizziness, or leg swelling  GASTROINTESTINAL: No abdominal or epigastric pain. No nausea, vomiting, or hematemesis; No diarrhea or constipation. No melena or hematochezia.  GENITOURINARY: No dysuria, frequency, hematuria, or incontinence  NEUROLOGICAL: No headaches, memory loss, loss of strength, numbness, or tremors  SKIN: No itching, burning, rashes, or lesions   	    [X] All others negative	  [ ] Unable to obtain    PHYSICAL EXAM:  T(C): 37.4 (04-01-20 @ 12:45), Max: 38.7 (04-01-20 @ 07:24)  HR: 101 (04-01-20 @ 13:20) (62 - 101)  BP: 191/100 (04-01-20 @ 13:20) (146/87 - 191/100)  RR: 22 (04-01-20 @ 13:20) (18 - 22)  SpO2: 91% (04-01-20 @ 13:34) (85% - 99%)  Wt(kg): --  I&O's Summary      Appearance: Normal, obese 	  Psychiatry: A & O x 3, Mood & affect appropriate  HEENT:   Normal oral mucosa, PERRL, EOMI	  Lymphatic: No lymphadenopathy  Cardiovascular: Normal S1 S2,RRR, No JVD, No murmurs  Respiratory:  diminished 	  Gastrointestinal:  Soft, Non-tender, + BS	  Skin: No rashes, No ecchymoses, No cyanosis	  Neurologic: Non-focal  Extremities: Normal range of motion, No clubbing, cyanosis or edema  Vascular: Peripheral pulses palpable 2+ bilaterally    TELEMETRY: 	    ECG:  NSR, LAE , QTC wnl 	  RADIOLOGY: < from: Xray Chest 1 View- PORTABLE-Urgent (03.31.20 @ 02:54) >  FINDINGS:    Low lung volumes limiting evaluation. Patchy bilateral airspace opacities. No pneumothorax. The heart is enlarged.     IMPRESSION: Limited study but suspicion for opacities consistent with covid 19 infection.    < end of copied text >    OTHER: 	  	  LABS:	 	    CARDIAC MARKERS:                                  13.0   9.03  )-----------( 392      ( 01 Apr 2020 05:45 )             39.6     04-01    136  |  95<L>  |  20  ----------------------------<  146<H>  3.9   |  26  |  1.24    Ca    8.9      01 Apr 2020 05:45  Mg     2.1     04-01    TPro  7.4  /  Alb  2.8<L>  /  TBili  0.7  /  DBili  x   /  AST  60<H>  /  ALT  47<H>  /  AlkPhos  38<L>  04-01    PT/INR - ( 31 Mar 2020 03:40 )   PT: 11.9 SEC;   INR: 1.04          PTT - ( 31 Mar 2020 03:40 )  PTT:30.3 SEC  proBNP:   Lipid Profile:   HgA1c: Hemoglobin A1C, Whole Blood: 8.3 % (04-01 @ 05:45)    TSH:

## 2020-04-01 NOTE — CONSULT NOTE ADULT - ASSESSMENT
59 y /o Male  with PMH of HTN, HLD, and DM p/w fever and fatigue x 1 week found to be COVID + as of 3/31/20.     1. Acute hypoxic respiratory failure 2/2 to COVID infection  COVID + as of 3/31/20.  supplemental O2 - on non rebreather   pt. started on hydroxychloroquine  Ekg noted 4/1 - QTC wnl, continue to monitor daily EKG  pulm f/u , ID eval   med f/u     2. HTN   bp elevated  increase norvasc to 10mg daily   continue other meds for now    dvt ppx 59 y /o Male  with PMH of HTN, HLD, and DM p/w fever and fatigue x 1 week found to be COVID + as of 3/31/20.     1. Acute hypoxic respiratory failure 2/2 to COVID infection  COVID + as of 3/31/20.  supplemental O2 - on non rebreather  IV steroids   pt. started on hydroxychloroquine  Ekg noted 4/1 - QTC wnl, continue to monitor daily EKG  pulm f/u , ID eval   med f/u     2. HTN   bp elevated  increase norvasc to 10mg daily   start toprol 25mg daily next if needed   continue other meds for now    dvt ppx

## 2020-04-01 NOTE — CONSULT NOTE ADULT - SUBJECTIVE AND OBJECTIVE BOX
Patient is a 59y old  Male who presents with a chief complaint of COVID + (01 Apr 2020 00:06)      HPI:  59 y /o Male  with PMH of HTN, HLD, and DM p/w fever and fatigue x 1 week  . Developed cough and worsening SOB over the past few days,   . Denies any chest pain, abd pain, N/V/D. Went to an urgent care where he was tested for COVID but has not gotten the results.   On arrival to the ER, hypoxic to high 80s on RA improved w/n/c o2 (31 Mar 2020 10:36)    currently not adriana position to give good history reviewed the history:         ?FOLLOWING PRESENT  [ x] Hx of PE/DVT, [ x] Hx COPD, x[ ] Hx of Asthma, [ x] Hx of Hospitalization, x[ ]  Hx of BiPAP/CPAP use, x[ ] Hx of DEEJAY    Allergies    No Known Allergies    Intolerances        PAST MEDICAL & SURGICAL HISTORY:  HLD (hyperlipidemia)  Diabetes mellitus  HTN (hypertension)      FAMILY HISTORY:      Social History: [ unk ] TOBACCO                  [ unk  ] ETOH                                 [ unk  ] IVDA/DRUGS    REVIEW OF SYSTEMS      General:	x    Skin/Breast:x  	  Ophthalmologic:x  	  ENMT:	x    Respiratory and Thorax: sob  	  Cardiovascular:	x    Gastrointestinal:	x    Genitourinary:	x    Musculoskeletal:	x  x  Neurological:	    Psychiatric:	x    Hematology/Lymphatics:	  x  Endocrine:	  x  Allergic/Immunologic:	  x  MEDICATIONS  (STANDING):  ALBUTerol    90 MICROgram(s) HFA Inhaler 1 Puff(s) Inhalation every 4 hours  amLODIPine   Tablet 5 milliGRAM(s) Oral once  ascorbic acid IVPB 1500 milliGRAM(s) IV Intermittent every 6 hours  dextrose 5%. 1000 milliLiter(s) (50 mL/Hr) IV Continuous <Continuous>  dextrose 50% Injectable 12.5 Gram(s) IV Push once  dextrose 50% Injectable 25 Gram(s) IV Push once  dextrose 50% Injectable 25 Gram(s) IV Push once  heparin  Injectable 5000 Unit(s) SubCutaneous every 12 hours  hydrochlorothiazide 12.5 milliGRAM(s) Oral daily  hydroxychloroquine 200 milliGRAM(s) Oral every 12 hours  hydroxychloroquine   Oral   influenza   Vaccine 0.5 milliLiter(s) IntraMuscular once  insulin lispro (HumaLOG) corrective regimen sliding scale   SubCutaneous three times a day before meals  losartan 100 milliGRAM(s) Oral daily  methylPREDNISolone sodium succinate Injectable 60 milliGRAM(s) IV Push two times a day  thiamine 200 milliGRAM(s) Oral <User Schedule>    MEDICATIONS  (PRN):  acetaminophen   Tablet .. 650 milliGRAM(s) Oral every 6 hours PRN Temp greater or equal to 38.5C (101.3F)  dextrose 40% Gel 15 Gram(s) Oral once PRN Blood Glucose LESS THAN 70 milliGRAM(s)/deciliter  glucagon  Injectable 1 milliGRAM(s) IntraMuscular once PRN Glucose LESS THAN 70 milligrams/deciliter       Vital Signs Last 24 Hrs  T(C): 37.4 (01 Apr 2020 12:45), Max: 38.7 (01 Apr 2020 07:24)  T(F): 99.3 (01 Apr 2020 12:45), Max: 101.6 (01 Apr 2020 07:24)  HR: 101 (01 Apr 2020 13:20) (62 - 101)  BP: 191/100 (01 Apr 2020 13:20) (146/87 - 191/100)  BP(mean): --  RR: 22 (01 Apr 2020 13:20) (18 - 22)  SpO2: 91% (01 Apr 2020 13:34) (85% - 99%)        I&O's Summary      Physical Exam:   GENERAL: Obese  HEENT: JILL/   Atraumatic, Normocephalic  ENMT: No tonsillar erythema, exudates, or enlargement; Moist mucous membranes, Good dentition, No lesions  NECK: Supple, No JVD, Normal thyroid  CHEST/LUNG: Clear to auscultation bilaterally  CVS: Regular rate and rhythm; No murmurs, rubs, or gallops  GI: : Soft, Nontender, Nondistended; Bowel sounds present  NERVOUS SYSTEM:  Alert & Oriented X3  EXTREMITIES:  2+ Peripheral Pulses, No clubbing, cyanosis, or edema  LYMPH: No lymphadenopathy noted  SKIN: No rashes or lesions  ENDOCRINOLOGY: No Thyromegaly  PSYCH: Appropriate    Labs:                              13.0   9.03  )-----------( 392      ( 01 Apr 2020 05:45 )             39.6                         13.1   7.98  )-----------( 353      ( 31 Mar 2020 04:53 )             39.7     04-01    136  |  95<L>  |  20  ----------------------------<  146<H>  3.9   |  26  |  1.24  03-31    132<L>  |  91<L>  |  20  ----------------------------<  156<H>  3.8   |  27  |  1.57<H>    Ca    8.9      01 Apr 2020 05:45  Ca    8.3<L>      31 Mar 2020 03:40  Mg     2.1     04-01    TPro  7.4  /  Alb  2.8<L>  /  TBili  0.7  /  DBili  x   /  AST  60<H>  /  ALT  47<H>  /  AlkPhos  38<L>  04-01  TPro  7.2  /  Alb  3.6  /  TBili  0.4  /  DBili  x   /  AST  42<H>  /  ALT  33  /  AlkPhos  34<L>  03-31    CAPILLARY BLOOD GLUCOSE      POCT Blood Glucose.: 199 mg/dL (01 Apr 2020 12:32)  POCT Blood Glucose.: 150 mg/dL (01 Apr 2020 07:46)  POCT Blood Glucose.: 132 mg/dL (31 Mar 2020 22:15)  POCT Blood Glucose.: 100 mg/dL (31 Mar 2020 18:48)    LIVER FUNCTIONS - ( 01 Apr 2020 05:45 )  Alb: 2.8 g/dL / Pro: 7.4 g/dL / ALK PHOS: 38 u/L / ALT: 47 u/L / AST: 60 u/L / GGT: x           PT/INR - ( 31 Mar 2020 03:40 )   PT: 11.9 SEC;   INR: 1.04          PTT - ( 31 Mar 2020 03:40 )  PTT:30.3 SEC    D DImer      Studies  Chest X-RAY  CT SCAN Chest   CT Abdomen  Venous Dopplers: LE:   Others  < from: Xray Chest 1 View- PORTABLE-Urgent (03.31.20 @ 02:54) >    EXAM:  XR CHEST PORTABLE URGENT 1V        PROCEDURE DATE:  Mar 31 2020         INTERPRETATION:  CLINICAL INFORMATION: Fever and shortness of breath.    EXAM: 1 view of the chest.    COMPARISON: None.    FINDINGS:    Low lung volumes limiting evaluation. Patchy bilateral airspace opacities. No pneumothorax. The heart is enlarged.     IMPRESSION: Limited study but suspicion for opacities consistent with covid 19 infection.                CANDI NASH M.D., RADIOLOGY RESIDENT  This documenthas been electronically signed.  ANDREZ VERNON M.D., ATTENDING RADIOLOGIST  This document has been electronically signed. Mar 31 2020  7:09AM                < end of copied text >

## 2020-04-02 LAB
ALBUMIN SERPL ELPH-MCNC: 3 G/DL — LOW (ref 3.3–5)
ALP SERPL-CCNC: 46 U/L — SIGNIFICANT CHANGE UP (ref 40–120)
ALT FLD-CCNC: 47 U/L — HIGH (ref 4–41)
ANION GAP SERPL CALC-SCNC: 13 MMO/L — SIGNIFICANT CHANGE UP (ref 7–14)
AST SERPL-CCNC: 55 U/L — HIGH (ref 4–40)
BASOPHILS # BLD AUTO: 0.01 K/UL — SIGNIFICANT CHANGE UP (ref 0–0.2)
BASOPHILS NFR BLD AUTO: 0.1 % — SIGNIFICANT CHANGE UP (ref 0–2)
BILIRUB SERPL-MCNC: 0.7 MG/DL — SIGNIFICANT CHANGE UP (ref 0.2–1.2)
BUN SERPL-MCNC: 24 MG/DL — HIGH (ref 7–23)
CALCIUM SERPL-MCNC: 9.1 MG/DL — SIGNIFICANT CHANGE UP (ref 8.4–10.5)
CHLORIDE SERPL-SCNC: 92 MMOL/L — LOW (ref 98–107)
CO2 SERPL-SCNC: 27 MMOL/L — SIGNIFICANT CHANGE UP (ref 22–31)
CREAT SERPL-MCNC: 1.21 MG/DL — SIGNIFICANT CHANGE UP (ref 0.5–1.3)
CRP SERPL-MCNC: 255.4 MG/L — HIGH
EOSINOPHIL # BLD AUTO: 0 K/UL — SIGNIFICANT CHANGE UP (ref 0–0.5)
EOSINOPHIL NFR BLD AUTO: 0 % — SIGNIFICANT CHANGE UP (ref 0–6)
GLUCOSE BLDC GLUCOMTR-MCNC: 260 MG/DL — HIGH (ref 70–99)
GLUCOSE BLDC GLUCOMTR-MCNC: 280 MG/DL — HIGH (ref 70–99)
GLUCOSE BLDC GLUCOMTR-MCNC: 289 MG/DL — HIGH (ref 70–99)
GLUCOSE BLDC GLUCOMTR-MCNC: 290 MG/DL — HIGH (ref 70–99)
GLUCOSE BLDC GLUCOMTR-MCNC: 296 MG/DL — HIGH (ref 70–99)
GLUCOSE SERPL-MCNC: 296 MG/DL — HIGH (ref 70–99)
HCT VFR BLD CALC: 40.7 % — SIGNIFICANT CHANGE UP (ref 39–50)
HGB BLD-MCNC: 13.4 G/DL — SIGNIFICANT CHANGE UP (ref 13–17)
IMM GRANULOCYTES NFR BLD AUTO: 0.7 % — SIGNIFICANT CHANGE UP (ref 0–1.5)
LYMPHOCYTES # BLD AUTO: 0.49 K/UL — LOW (ref 1–3.3)
LYMPHOCYTES # BLD AUTO: 4.2 % — LOW (ref 13–44)
MCHC RBC-ENTMCNC: 26.6 PG — LOW (ref 27–34)
MCHC RBC-ENTMCNC: 32.9 % — SIGNIFICANT CHANGE UP (ref 32–36)
MCV RBC AUTO: 80.9 FL — SIGNIFICANT CHANGE UP (ref 80–100)
MONOCYTES # BLD AUTO: 0.29 K/UL — SIGNIFICANT CHANGE UP (ref 0–0.9)
MONOCYTES NFR BLD AUTO: 2.5 % — SIGNIFICANT CHANGE UP (ref 2–14)
NEUTROPHILS # BLD AUTO: 10.75 K/UL — HIGH (ref 1.8–7.4)
NEUTROPHILS NFR BLD AUTO: 92.5 % — HIGH (ref 43–77)
NRBC # FLD: 0 K/UL — SIGNIFICANT CHANGE UP (ref 0–0)
PLATELET # BLD AUTO: 468 K/UL — HIGH (ref 150–400)
PMV BLD: 9.9 FL — SIGNIFICANT CHANGE UP (ref 7–13)
POTASSIUM SERPL-MCNC: 3.9 MMOL/L — SIGNIFICANT CHANGE UP (ref 3.5–5.3)
POTASSIUM SERPL-SCNC: 3.9 MMOL/L — SIGNIFICANT CHANGE UP (ref 3.5–5.3)
PROT SERPL-MCNC: 7.8 G/DL — SIGNIFICANT CHANGE UP (ref 6–8.3)
RBC # BLD: 5.03 M/UL — SIGNIFICANT CHANGE UP (ref 4.2–5.8)
RBC # FLD: 14.5 % — SIGNIFICANT CHANGE UP (ref 10.3–14.5)
SODIUM SERPL-SCNC: 132 MMOL/L — LOW (ref 135–145)
WBC # BLD: 11.62 K/UL — HIGH (ref 3.8–10.5)
WBC # FLD AUTO: 11.62 K/UL — HIGH (ref 3.8–10.5)

## 2020-04-02 PROCEDURE — 93010 ELECTROCARDIOGRAM REPORT: CPT | Mod: 76

## 2020-04-02 RX ORDER — INSULIN GLARGINE 100 [IU]/ML
10 INJECTION, SOLUTION SUBCUTANEOUS AT BEDTIME
Refills: 0 | Status: DISCONTINUED | OUTPATIENT
Start: 2020-04-02 | End: 2020-04-03

## 2020-04-02 RX ORDER — CARVEDILOL PHOSPHATE 80 MG/1
3.12 CAPSULE, EXTENDED RELEASE ORAL EVERY 12 HOURS
Refills: 0 | Status: DISCONTINUED | OUTPATIENT
Start: 2020-04-02 | End: 2020-04-06

## 2020-04-02 RX ORDER — ALPRAZOLAM 0.25 MG
0.25 TABLET ORAL ONCE
Refills: 0 | Status: DISCONTINUED | OUTPATIENT
Start: 2020-04-02 | End: 2020-04-02

## 2020-04-02 RX ADMIN — Medication 60 MILLIGRAM(S): at 12:29

## 2020-04-02 RX ADMIN — AZITHROMYCIN 500 MILLIGRAM(S): 500 TABLET, FILM COATED ORAL at 18:00

## 2020-04-02 RX ADMIN — Medication 103 MILLIGRAM(S): at 12:52

## 2020-04-02 RX ADMIN — AMLODIPINE BESYLATE 10 MILLIGRAM(S): 2.5 TABLET ORAL at 05:15

## 2020-04-02 RX ADMIN — Medication 0.25 MILLIGRAM(S): at 05:14

## 2020-04-02 RX ADMIN — LOSARTAN POTASSIUM 100 MILLIGRAM(S): 100 TABLET, FILM COATED ORAL at 05:15

## 2020-04-02 RX ADMIN — Medication 6: at 08:24

## 2020-04-02 RX ADMIN — Medication 103 MILLIGRAM(S): at 18:00

## 2020-04-02 RX ADMIN — Medication 100 MILLIGRAM(S): at 02:51

## 2020-04-02 RX ADMIN — HEPARIN SODIUM 5000 UNIT(S): 5000 INJECTION INTRAVENOUS; SUBCUTANEOUS at 05:15

## 2020-04-02 RX ADMIN — Medication 200 MILLIGRAM(S): at 05:15

## 2020-04-02 RX ADMIN — Medication 12.5 MILLIGRAM(S): at 05:14

## 2020-04-02 RX ADMIN — Medication 200 MILLIGRAM(S): at 18:00

## 2020-04-02 RX ADMIN — Medication 200 MILLIGRAM(S): at 12:29

## 2020-04-02 RX ADMIN — Medication 103 MILLIGRAM(S): at 05:20

## 2020-04-02 RX ADMIN — INSULIN GLARGINE 10 UNIT(S): 100 INJECTION, SOLUTION SUBCUTANEOUS at 23:43

## 2020-04-02 RX ADMIN — Medication 6: at 18:00

## 2020-04-02 RX ADMIN — Medication 6: at 12:29

## 2020-04-02 RX ADMIN — Medication 103 MILLIGRAM(S): at 23:44

## 2020-04-02 RX ADMIN — Medication 103 MILLIGRAM(S): at 00:12

## 2020-04-02 RX ADMIN — Medication 200 MILLIGRAM(S): at 23:44

## 2020-04-02 RX ADMIN — HEPARIN SODIUM 5000 UNIT(S): 5000 INJECTION INTRAVENOUS; SUBCUTANEOUS at 18:00

## 2020-04-02 RX ADMIN — Medication 60 MILLIGRAM(S): at 01:26

## 2020-04-02 RX ADMIN — Medication 100 MILLIGRAM(S): at 08:24

## 2020-04-02 RX ADMIN — Medication 100 MILLIGRAM(S): at 14:30

## 2020-04-02 RX ADMIN — CARVEDILOL PHOSPHATE 3.12 MILLIGRAM(S): 80 CAPSULE, EXTENDED RELEASE ORAL at 18:00

## 2020-04-02 RX ADMIN — Medication 60 MILLIGRAM(S): at 23:43

## 2020-04-02 RX ADMIN — Medication 100 MILLIGRAM(S): at 21:15

## 2020-04-02 RX ADMIN — Medication 200 MILLIGRAM(S): at 00:08

## 2020-04-02 NOTE — PROGRESS NOTE ADULT - ASSESSMENT
pt w/ symptoms c/w covid  c/w tx  ? trial  id f/u  pulm  f/u   steroids /+ anakinra   ecg daily  dvt proph  dm  fsg riss  hold oral meds  htn  c/w meds   add coreg  stop hctz as dec in sodium   o2/nrb   albuterol mdi  f/u inflammatory markers noted    spoke w/ domestic partner about situation  prognosis guarded

## 2020-04-02 NOTE — PROGRESS NOTE ADULT - SUBJECTIVE AND OBJECTIVE BOX
ACP team note     - was called to  evaluate pt for hypoxia (low 80s on NRB)  - pt was evaluated and was in NAD  - RR 20s and spO2 on NRB 91%  - pt is in NAD, pt resting in bed comfortably, no labored breathing, no tachypnea  -  will cont. monitoring ACP team note     - was called to  evaluate pt for hypoxia (low 80s on NRB)  - pt was evaluated and was in NAD  - RR 20s and spO2 on 15L NRB 91%   - pt is in NAD, pt resting in bed comfortably, no labored breathing, no tachypnea  -  will cont. monitoring

## 2020-04-02 NOTE — PROGRESS NOTE ADULT - SUBJECTIVE AND OBJECTIVE BOX
CHIEF COMPLAINT:Patient is a 59y old  Male who presents with a chief complaint of fever fatigue covid 19 (02 Apr 2020 03:50)    	        PAST MEDICAL & SURGICAL HISTORY:  HLD (hyperlipidemia)  Diabetes mellitus  HTN (hypertension)          REVIEW OF SYSTEMS:  weak  lethargic  RESPIRATORY: cough / sob  CARDIOVASCULAR: No chest pain, palpitations,   GASTROINTESTINAL: No abdominal or epigastric pain  NEUROLOGICAL: No headaches,       Medications:  MEDICATIONS  (STANDING):  ALBUTerol    90 MICROgram(s) HFA Inhaler 1 Puff(s) Inhalation every 4 hours  amLODIPine   Tablet 10 milliGRAM(s) Oral daily  anakinra Injectable 100 milliGRAM(s) SubCutaneous <User Schedule>  ascorbic acid IVPB 1500 milliGRAM(s) IV Intermittent every 6 hours  azithromycin   Tablet 500 milliGRAM(s) Oral every 24 hours  carvedilol 3.125 milliGRAM(s) Oral every 12 hours  dextrose 5%. 1000 milliLiter(s) (50 mL/Hr) IV Continuous <Continuous>  dextrose 50% Injectable 12.5 Gram(s) IV Push once  dextrose 50% Injectable 25 Gram(s) IV Push once  dextrose 50% Injectable 25 Gram(s) IV Push once  heparin  Injectable 5000 Unit(s) SubCutaneous every 12 hours  hydroxychloroquine 200 milliGRAM(s) Oral every 12 hours  hydroxychloroquine   Oral   influenza   Vaccine 0.5 milliLiter(s) IntraMuscular once  insulin lispro (HumaLOG) corrective regimen sliding scale   SubCutaneous three times a day before meals  losartan 100 milliGRAM(s) Oral daily  methylPREDNISolone sodium succinate Injectable 60 milliGRAM(s) IV Push two times a day  thiamine 200 milliGRAM(s) Oral <User Schedule>    MEDICATIONS  (PRN):  acetaminophen   Tablet .. 650 milliGRAM(s) Oral every 6 hours PRN Temp greater or equal to 38.5C (101.3F)  dextrose 40% Gel 15 Gram(s) Oral once PRN Blood Glucose LESS THAN 70 milliGRAM(s)/deciliter  glucagon  Injectable 1 milliGRAM(s) IntraMuscular once PRN Glucose LESS THAN 70 milligrams/deciliter    	    PHYSICAL EXAM:  T(C): 36.7 (04-02-20 @ 11:56), Max: 37.2 (04-01-20 @ 16:15)  HR: 93 (04-02-20 @ 11:56) (93 - 97)  BP: 173/99 (04-02-20 @ 05:13) (149/67 - 182/86)  RR: 19 (04-02-20 @ 11:56) (19 - 24)  SpO2: 93% (04-02-20 @ 11:56) (89% - 93%)  Wt(kg): --  I&O's Summary      Appearance: Normal	  HEENT:   Normal oral mucosa, PERRL, EOMI	  Lymphatic: No lymphadenopathy  Cardiovascular: Normal S1 S2, No JVD,   Respiratory:dec  bs   Psychiatry: A & O   Gastrointestinal:  Soft, Non-tender, + BS	  Skin: No rashes, No ecchymoses, No cyanosis	  Neurologic: Non-focal  Extremities: dec rom     TELEMETRY: 	    ECG:  	  RADIOLOGY:  OTHER: 	  	  LABS:	 	    CARDIAC MARKERS:                                13.4   11.62 )-----------( 468      ( 02 Apr 2020 05:25 )             40.7     04-02    132<L>  |  92<L>  |  24<H>  ----------------------------<  296<H>  3.9   |  27  |  1.21    Ca    9.1      02 Apr 2020 05:25  Mg     2.1     04-01    TPro  7.8  /  Alb  3.0<L>  /  TBili  0.7  /  DBili  x   /  AST  55<H>  /  ALT  47<H>  /  AlkPhos  46  04-02    proBNP:   Lipid Profile:   HgA1c:   TSH:

## 2020-04-02 NOTE — PROGRESS NOTE ADULT - SUBJECTIVE AND OBJECTIVE BOX
Patient is a 59y old  Male who presents with a chief complaint of fever fatigue covid 19 (02 Apr 2020 03:50)      Any change in ROS: still hypoxic: but he looks a shade better then yesterday     MEDICATIONS  (STANDING):  ALBUTerol    90 MICROgram(s) HFA Inhaler 1 Puff(s) Inhalation every 4 hours  amLODIPine   Tablet 10 milliGRAM(s) Oral daily  anakinra Injectable 100 milliGRAM(s) SubCutaneous <User Schedule>  ascorbic acid IVPB 1500 milliGRAM(s) IV Intermittent every 6 hours  azithromycin   Tablet 500 milliGRAM(s) Oral every 24 hours  carvedilol 3.125 milliGRAM(s) Oral every 12 hours  dextrose 5%. 1000 milliLiter(s) (50 mL/Hr) IV Continuous <Continuous>  dextrose 50% Injectable 12.5 Gram(s) IV Push once  dextrose 50% Injectable 25 Gram(s) IV Push once  dextrose 50% Injectable 25 Gram(s) IV Push once  heparin  Injectable 5000 Unit(s) SubCutaneous every 12 hours  hydroxychloroquine 200 milliGRAM(s) Oral every 12 hours  hydroxychloroquine   Oral   influenza   Vaccine 0.5 milliLiter(s) IntraMuscular once  insulin lispro (HumaLOG) corrective regimen sliding scale   SubCutaneous three times a day before meals  losartan 100 milliGRAM(s) Oral daily  methylPREDNISolone sodium succinate Injectable 60 milliGRAM(s) IV Push two times a day  thiamine 200 milliGRAM(s) Oral <User Schedule>    MEDICATIONS  (PRN):  acetaminophen   Tablet .. 650 milliGRAM(s) Oral every 6 hours PRN Temp greater or equal to 38.5C (101.3F)  dextrose 40% Gel 15 Gram(s) Oral once PRN Blood Glucose LESS THAN 70 milliGRAM(s)/deciliter  glucagon  Injectable 1 milliGRAM(s) IntraMuscular once PRN Glucose LESS THAN 70 milligrams/deciliter    Vital Signs Last 24 Hrs  T(C): 36.9 (02 Apr 2020 14:23), Max: 37.2 (01 Apr 2020 16:15)  T(F): 98.5 (02 Apr 2020 14:23), Max: 99 (02 Apr 2020 00:30)  HR: 85 (02 Apr 2020 14:23) (85 - 97)  BP: 136/89 (02 Apr 2020 14:23) (136/89 - 182/86)  BP(mean): --  RR: 20 (02 Apr 2020 14:23) (19 - 24)  SpO2: 92% (02 Apr 2020 14:23) (89% - 93%)    I&O's Summary        Physical Exam:   GENERAL: NAD, well-groomed, well-developed  HEENT: JILL/   Atraumatic, Normocephalic  ENMT: No tonsillar erythema, exudates, or enlargement; Moist mucous membranes, Good dentition, No lesions  NECK: Supple, No JVD, Normal thyroid  CHEST/LUNG: Clear to auscultaion, ; No rales, rhonchi, wheezing, or rubs  CVS: Regular rate and rhythm; No murmurs, rubs, or gallops  GI: : Soft, Nontender, Nondistended; Bowel sounds present  NERVOUS SYSTEM:  Alert & Oriented X3  EXTREMITIES:  2+ Peripheral Pulses, No clubbing, cyanosis, or edema  LYMPH: No lymphadenopathy noted  SKIN: No rashes or lesions  ENDOCRINOLOGY: No Thyromegaly  PSYCH: Appropriate    Labs:                              13.4   11.62 )-----------( 468      ( 02 Apr 2020 05:25 )             40.7                         13.0   9.03  )-----------( 392      ( 01 Apr 2020 05:45 )             39.6                         13.1   7.98  )-----------( 353      ( 31 Mar 2020 04:53 )             39.7     04-02    132<L>  |  92<L>  |  24<H>  ----------------------------<  296<H>  3.9   |  27  |  1.21  04-01    136  |  95<L>  |  20  ----------------------------<  146<H>  3.9   |  26  |  1.24  03-31    132<L>  |  91<L>  |  20  ----------------------------<  156<H>  3.8   |  27  |  1.57<H>    Ca    9.1      02 Apr 2020 05:25  Ca    8.9      01 Apr 2020 05:45  Mg     2.1     04-01    TPro  7.8  /  Alb  3.0<L>  /  TBili  0.7  /  DBili  x   /  AST  55<H>  /  ALT  47<H>  /  AlkPhos  46  04-02  TPro  7.4  /  Alb  2.8<L>  /  TBili  0.7  /  DBili  x   /  AST  60<H>  /  ALT  47<H>  /  AlkPhos  38<L>  04-01  TPro  7.2  /  Alb  3.6  /  TBili  0.4  /  DBili  x   /  AST  42<H>  /  ALT  33  /  AlkPhos  34<L>  03-31    CAPILLARY BLOOD GLUCOSE      POCT Blood Glucose.: 296 mg/dL (02 Apr 2020 12:08)  POCT Blood Glucose.: 280 mg/dL (02 Apr 2020 08:02)  POCT Blood Glucose.: 246 mg/dL (01 Apr 2020 22:13)  POCT Blood Glucose.: 250 mg/dL (01 Apr 2020 17:10)      LIVER FUNCTIONS - ( 02 Apr 2020 05:25 )  Alb: 3.0 g/dL / Pro: 7.8 g/dL / ALK PHOS: 46 u/L / ALT: 47 u/L / AST: 55 u/L / GGT: x                 < from: Xray Chest 1 View- PORTABLE-Urgent (03.31.20 @ 02:54) >    EXAM:  XR CHEST PORTABLE URGENT 1V        PROCEDURE DATE:  Mar 31 2020         INTERPRETATION:  CLINICAL INFORMATION: Fever and shortness of breath.    EXAM: 1 view of the chest.    COMPARISON: None.    FINDINGS:    Low lung volumes limiting evaluation. Patchy bilateral airspace opacities. No pneumothorax. The heart is enlarged.     IMPRESSION: Limited study but suspicion for opacities consistent with covid 19 infection.                CANDI NASH M.D., RADIOLOGY RESIDENT  This documenthas been electronically signed.  ANDREZ VERNON M.D., ATTENDING RADIOLOGIST  This document has been electronically signed. Mar 31 2020  7:09AM        < end of copied text >      RECENT CULTURES:        RESPIRATORY CULTURES:          Studies  Chest X-RAY  CT SCAN Chest   Venous Dopplers: LE:   CT Abdomen  Others

## 2020-04-02 NOTE — PROGRESS NOTE ADULT - ASSESSMENT
59 y /o Male  with PMH of HTN, HLD, and DM p/w fever and fatigue x 1 week found to be COVID + as of 3/31/20.     1. Acute hypoxic respiratory failure 2/2 to COVID infection  COVID + as of 3/31/20.  supplemental O2 - on non rebreather  IV steroids   on hydroxychloroquine/abx   Ekg noted 4/1 - QTC wnl, continue to monitor daily EKG  pulm f/u , ID f/u   med f/u     2. HTN   bp elevated prior to am meds, continue to trend   start low dose coreg (3.25 mg BID) next if needed   continue other meds for now    dvt ppx

## 2020-04-02 NOTE — PROGRESS NOTE ADULT - SUBJECTIVE AND OBJECTIVE BOX
CARDIOLOGY FOLLOW UP - Dr. Piña    CC events noted.s/p RRT 4/1  hypoxia to low 80, remains on NRB       PHYSICAL EXAM:  T(C): 36.7 (04-02-20 @ 11:56), Max: 37.4 (04-01-20 @ 12:45)  HR: 93 (04-02-20 @ 11:56) (93 - 101)  BP: 173/99 (04-02-20 @ 05:13) (149/67 - 191/100)  RR: 19 (04-02-20 @ 11:56) (19 - 24)  SpO2: 93% (04-02-20 @ 11:56) (85% - 96%)  Wt(kg): --  I&O's Summary      Appearance:  Normal, obese on NRB 		  Cardiovascular: Normal S1 S2,RRR, No JVD, No murmurs  Respiratory: diminished   Gastrointestinal:  Soft, Non-tender, + BS	  Extremities: Normal range of motion, No clubbing, cyanosis or edema        MEDICATIONS  (STANDING):  ALBUTerol    90 MICROgram(s) HFA Inhaler 1 Puff(s) Inhalation every 4 hours  amLODIPine   Tablet 10 milliGRAM(s) Oral daily  anakinra Injectable 100 milliGRAM(s) SubCutaneous <User Schedule>  ascorbic acid IVPB 1500 milliGRAM(s) IV Intermittent every 6 hours  azithromycin   Tablet 500 milliGRAM(s) Oral every 24 hours  dextrose 5%. 1000 milliLiter(s) (50 mL/Hr) IV Continuous <Continuous>  dextrose 50% Injectable 12.5 Gram(s) IV Push once  dextrose 50% Injectable 25 Gram(s) IV Push once  dextrose 50% Injectable 25 Gram(s) IV Push once  heparin  Injectable 5000 Unit(s) SubCutaneous every 12 hours  hydrochlorothiazide 12.5 milliGRAM(s) Oral daily  hydroxychloroquine 200 milliGRAM(s) Oral every 12 hours  hydroxychloroquine   Oral   influenza   Vaccine 0.5 milliLiter(s) IntraMuscular once  insulin lispro (HumaLOG) corrective regimen sliding scale   SubCutaneous three times a day before meals  losartan 100 milliGRAM(s) Oral daily  methylPREDNISolone sodium succinate Injectable 60 milliGRAM(s) IV Push two times a day  thiamine 200 milliGRAM(s) Oral <User Schedule>      TELEMETRY: 	    ECG:  	  RADIOLOGY:   DIAGNOSTIC TESTING:  [ ] Echocardiogram:  [ ]  Catheterization:  [ ] Stress Test:    OTHER: 	    LABS:	 	    Troponin T, High Sensitivity: 31 ng/L [<6 - 14] (03-31 @ 03:40)                          13.4   11.62 )-----------( 468      ( 02 Apr 2020 05:25 )             40.7     04-02    132<L>  |  92<L>  |  24<H>  ----------------------------<  296<H>  3.9   |  27  |  1.21    Ca    9.1      02 Apr 2020 05:25  Mg     2.1     04-01    TPro  7.8  /  Alb  3.0<L>  /  TBili  0.7  /  DBili  x   /  AST  55<H>  /  ALT  47<H>  /  AlkPhos  46  04-02

## 2020-04-03 LAB
ALBUMIN SERPL ELPH-MCNC: 3 G/DL — LOW (ref 3.3–5)
ALP SERPL-CCNC: 54 U/L — SIGNIFICANT CHANGE UP (ref 40–120)
ALT FLD-CCNC: 41 U/L — SIGNIFICANT CHANGE UP (ref 4–41)
ANION GAP SERPL CALC-SCNC: 13 MMO/L — SIGNIFICANT CHANGE UP (ref 7–14)
AST SERPL-CCNC: 47 U/L — HIGH (ref 4–40)
BASOPHILS # BLD AUTO: 0.01 K/UL — SIGNIFICANT CHANGE UP (ref 0–0.2)
BASOPHILS NFR BLD AUTO: 0.1 % — SIGNIFICANT CHANGE UP (ref 0–2)
BILIRUB SERPL-MCNC: 0.5 MG/DL — SIGNIFICANT CHANGE UP (ref 0.2–1.2)
BUN SERPL-MCNC: 33 MG/DL — HIGH (ref 7–23)
CALCIUM SERPL-MCNC: 9 MG/DL — SIGNIFICANT CHANGE UP (ref 8.4–10.5)
CHLORIDE SERPL-SCNC: 92 MMOL/L — LOW (ref 98–107)
CO2 SERPL-SCNC: 29 MMOL/L — SIGNIFICANT CHANGE UP (ref 22–31)
CREAT SERPL-MCNC: 1.23 MG/DL — SIGNIFICANT CHANGE UP (ref 0.5–1.3)
CRP SERPL-MCNC: 92.5 MG/L — HIGH
EOSINOPHIL # BLD AUTO: 0 K/UL — SIGNIFICANT CHANGE UP (ref 0–0.5)
EOSINOPHIL NFR BLD AUTO: 0 % — SIGNIFICANT CHANGE UP (ref 0–6)
GLUCOSE BLDC GLUCOMTR-MCNC: 265 MG/DL — HIGH (ref 70–99)
GLUCOSE BLDC GLUCOMTR-MCNC: 301 MG/DL — HIGH (ref 70–99)
GLUCOSE BLDC GLUCOMTR-MCNC: 302 MG/DL — HIGH (ref 70–99)
GLUCOSE BLDC GLUCOMTR-MCNC: 322 MG/DL — HIGH (ref 70–99)
GLUCOSE BLDC GLUCOMTR-MCNC: 376 MG/DL — HIGH (ref 70–99)
GLUCOSE SERPL-MCNC: 316 MG/DL — HIGH (ref 70–99)
HCT VFR BLD CALC: 40 % — SIGNIFICANT CHANGE UP (ref 39–50)
HGB BLD-MCNC: 12.8 G/DL — LOW (ref 13–17)
IMM GRANULOCYTES NFR BLD AUTO: 0.6 % — SIGNIFICANT CHANGE UP (ref 0–1.5)
LYMPHOCYTES # BLD AUTO: 0.89 K/UL — LOW (ref 1–3.3)
LYMPHOCYTES # BLD AUTO: 6.3 % — LOW (ref 13–44)
MCHC RBC-ENTMCNC: 26.1 PG — LOW (ref 27–34)
MCHC RBC-ENTMCNC: 32 % — SIGNIFICANT CHANGE UP (ref 32–36)
MCV RBC AUTO: 81.5 FL — SIGNIFICANT CHANGE UP (ref 80–100)
MONOCYTES # BLD AUTO: 0.51 K/UL — SIGNIFICANT CHANGE UP (ref 0–0.9)
MONOCYTES NFR BLD AUTO: 3.6 % — SIGNIFICANT CHANGE UP (ref 2–14)
NEUTROPHILS # BLD AUTO: 12.56 K/UL — HIGH (ref 1.8–7.4)
NEUTROPHILS NFR BLD AUTO: 89.4 % — HIGH (ref 43–77)
NRBC # FLD: 0.02 K/UL — SIGNIFICANT CHANGE UP (ref 0–0)
PLATELET # BLD AUTO: 476 K/UL — HIGH (ref 150–400)
PMV BLD: 9.8 FL — SIGNIFICANT CHANGE UP (ref 7–13)
POTASSIUM SERPL-MCNC: 4.1 MMOL/L — SIGNIFICANT CHANGE UP (ref 3.5–5.3)
POTASSIUM SERPL-SCNC: 4.1 MMOL/L — SIGNIFICANT CHANGE UP (ref 3.5–5.3)
PROT SERPL-MCNC: 7.4 G/DL — SIGNIFICANT CHANGE UP (ref 6–8.3)
RBC # BLD: 4.91 M/UL — SIGNIFICANT CHANGE UP (ref 4.2–5.8)
RBC # FLD: 14.6 % — HIGH (ref 10.3–14.5)
SODIUM SERPL-SCNC: 134 MMOL/L — LOW (ref 135–145)
WBC # BLD: 14.06 K/UL — HIGH (ref 3.8–10.5)
WBC # FLD AUTO: 14.06 K/UL — HIGH (ref 3.8–10.5)

## 2020-04-03 PROCEDURE — 93010 ELECTROCARDIOGRAM REPORT: CPT

## 2020-04-03 RX ORDER — INSULIN GLARGINE 100 [IU]/ML
15 INJECTION, SOLUTION SUBCUTANEOUS AT BEDTIME
Refills: 0 | Status: DISCONTINUED | OUTPATIENT
Start: 2020-04-03 | End: 2020-04-08

## 2020-04-03 RX ORDER — HEPARIN SODIUM 5000 [USP'U]/ML
5000 INJECTION INTRAVENOUS; SUBCUTANEOUS EVERY 8 HOURS
Refills: 0 | Status: DISCONTINUED | OUTPATIENT
Start: 2020-04-03 | End: 2020-04-09

## 2020-04-03 RX ADMIN — AMLODIPINE BESYLATE 10 MILLIGRAM(S): 2.5 TABLET ORAL at 05:17

## 2020-04-03 RX ADMIN — HEPARIN SODIUM 5000 UNIT(S): 5000 INJECTION INTRAVENOUS; SUBCUTANEOUS at 05:17

## 2020-04-03 RX ADMIN — Medication 103 MILLIGRAM(S): at 20:15

## 2020-04-03 RX ADMIN — Medication 200 MILLIGRAM(S): at 05:17

## 2020-04-03 RX ADMIN — AZITHROMYCIN 500 MILLIGRAM(S): 500 TABLET, FILM COATED ORAL at 18:06

## 2020-04-03 RX ADMIN — Medication 200 MILLIGRAM(S): at 13:05

## 2020-04-03 RX ADMIN — CARVEDILOL PHOSPHATE 3.12 MILLIGRAM(S): 80 CAPSULE, EXTENDED RELEASE ORAL at 05:16

## 2020-04-03 RX ADMIN — HEPARIN SODIUM 5000 UNIT(S): 5000 INJECTION INTRAVENOUS; SUBCUTANEOUS at 21:54

## 2020-04-03 RX ADMIN — Medication 103 MILLIGRAM(S): at 05:15

## 2020-04-03 RX ADMIN — Medication 100 MILLIGRAM(S): at 03:41

## 2020-04-03 RX ADMIN — Medication 200 MILLIGRAM(S): at 21:55

## 2020-04-03 RX ADMIN — Medication 100 MILLIGRAM(S): at 15:41

## 2020-04-03 RX ADMIN — Medication 200 MILLIGRAM(S): at 18:06

## 2020-04-03 RX ADMIN — Medication 8: at 13:04

## 2020-04-03 RX ADMIN — CARVEDILOL PHOSPHATE 3.12 MILLIGRAM(S): 80 CAPSULE, EXTENDED RELEASE ORAL at 18:07

## 2020-04-03 RX ADMIN — Medication 60 MILLIGRAM(S): at 13:05

## 2020-04-03 RX ADMIN — Medication 8: at 18:21

## 2020-04-03 RX ADMIN — Medication 103 MILLIGRAM(S): at 15:37

## 2020-04-03 RX ADMIN — Medication 100 MILLIGRAM(S): at 08:25

## 2020-04-03 RX ADMIN — LOSARTAN POTASSIUM 100 MILLIGRAM(S): 100 TABLET, FILM COATED ORAL at 05:15

## 2020-04-03 RX ADMIN — Medication 8: at 08:24

## 2020-04-03 RX ADMIN — INSULIN GLARGINE 15 UNIT(S): 100 INJECTION, SOLUTION SUBCUTANEOUS at 21:54

## 2020-04-03 RX ADMIN — Medication 100 MILLIGRAM(S): at 20:15

## 2020-04-03 NOTE — PROGRESS NOTE ADULT - ASSESSMENT
pt w/ symptoms c/w covid  c/w tx  pulm  f/u   steroids /+ anakinra /plaquenil  ecg daily  dvt proph  dm  fsg riss  hold oral meds/inc insulin at night for inc fsg  htn  c/w meds as per cards  o2/nrb   albuterol mdi  f/u inflammatory markers noted    spoke w/ domestic partner about situation today   prognosis guarded

## 2020-04-03 NOTE — PROGRESS NOTE ADULT - SUBJECTIVE AND OBJECTIVE BOX
Patient is a 59y old  Male who presents with a chief complaint of sob (02 Apr 2020 15:08)      Any change in ROS: on 100% NRB:   feels OK: breathing is lightly better       MEDICATIONS  (STANDING):  ALBUTerol    90 MICROgram(s) HFA Inhaler 1 Puff(s) Inhalation every 4 hours  amLODIPine   Tablet 10 milliGRAM(s) Oral daily  anakinra Injectable 100 milliGRAM(s) SubCutaneous <User Schedule>  ascorbic acid IVPB 1500 milliGRAM(s) IV Intermittent every 6 hours  azithromycin   Tablet 500 milliGRAM(s) Oral every 24 hours  carvedilol 3.125 milliGRAM(s) Oral every 12 hours  dextrose 5%. 1000 milliLiter(s) (50 mL/Hr) IV Continuous <Continuous>  dextrose 50% Injectable 12.5 Gram(s) IV Push once  dextrose 50% Injectable 25 Gram(s) IV Push once  dextrose 50% Injectable 25 Gram(s) IV Push once  heparin  Injectable 5000 Unit(s) SubCutaneous every 12 hours  hydroxychloroquine 200 milliGRAM(s) Oral every 12 hours  hydroxychloroquine   Oral   influenza   Vaccine 0.5 milliLiter(s) IntraMuscular once  insulin glargine Injectable (LANTUS) 10 Unit(s) SubCutaneous at bedtime  insulin lispro (HumaLOG) corrective regimen sliding scale   SubCutaneous three times a day before meals  losartan 100 milliGRAM(s) Oral daily  methylPREDNISolone sodium succinate Injectable 60 milliGRAM(s) IV Push two times a day  thiamine 200 milliGRAM(s) Oral <User Schedule>    MEDICATIONS  (PRN):  acetaminophen   Tablet .. 650 milliGRAM(s) Oral every 6 hours PRN Temp greater or equal to 38.5C (101.3F)  dextrose 40% Gel 15 Gram(s) Oral once PRN Blood Glucose LESS THAN 70 milliGRAM(s)/deciliter  glucagon  Injectable 1 milliGRAM(s) IntraMuscular once PRN Glucose LESS THAN 70 milligrams/deciliter    Vital Signs Last 24 Hrs  T(C): 36.8 (03 Apr 2020 13:23), Max: 37.2 (02 Apr 2020 17:56)  T(F): 98.2 (03 Apr 2020 13:23), Max: 98.9 (02 Apr 2020 17:56)  HR: 75 (03 Apr 2020 13:23) (75 - 89)  BP: 148/87 (03 Apr 2020 13:23) (148/87 - 180/109)  BP(mean): --  RR: 20 (03 Apr 2020 13:23) (20 - 21)  SpO2: 99% (03 Apr 2020 13:23) (97% - 99%)    I&O's Summary        Physical Exam:   GENERAL: NAD, well-groomed, well-developed  HEENT: JILL/   Atraumatic, Normocephalic  ENMT: No tonsillar erythema, exudates, or enlargement; Moist mucous membranes, Good dentition, No lesions  NECK: Supple, No JVD, Normal thyroid  CHEST/LUNG: Clear to auscultaion, ; No rales, rhonchi, wheezing, or rubs  CVS: Regular rate and rhythm; No murmurs, rubs, or gallops  GI: : Soft, Nontender, Nondistended; Bowel sounds present  NERVOUS SYSTEM:  Alert & Oriented X3  EXTREMITIES:  2+ Peripheral Pulses, No clubbing, cyanosis, or edema  LYMPH: No lymphadenopathy noted  SKIN: No rashes or lesions  ENDOCRINOLOGY: No Thyromegaly  PSYCH: Appropriate    Labs:                              12.8   14.06 )-----------( 476      ( 03 Apr 2020 06:05 )             40.0                         13.4   11.62 )-----------( 468      ( 02 Apr 2020 05:25 )             40.7                         13.0   9.03  )-----------( 392      ( 01 Apr 2020 05:45 )             39.6                         13.1   7.98  )-----------( 353      ( 31 Mar 2020 04:53 )             39.7     04-03    134<L>  |  92<L>  |  33<H>  ----------------------------<  316<H>  4.1   |  29  |  1.23  04-02    132<L>  |  92<L>  |  24<H>  ----------------------------<  296<H>  3.9   |  27  |  1.21  04-01    136  |  95<L>  |  20  ----------------------------<  146<H>  3.9   |  26  |  1.24  03-31    132<L>  |  91<L>  |  20  ----------------------------<  156<H>  3.8   |  27  |  1.57<H>    Ca    9.0      03 Apr 2020 06:05  Ca    9.1      02 Apr 2020 05:25    TPro  7.4  /  Alb  3.0<L>  /  TBili  0.5  /  DBili  x   /  AST  47<H>  /  ALT  41  /  AlkPhos  54  04-03  TPro  7.8  /  Alb  3.0<L>  /  TBili  0.7  /  DBili  x   /  AST  55<H>  /  ALT  47<H>  /  AlkPhos  46  04-02  TPro  7.4  /  Alb  2.8<L>  /  TBili  0.7  /  DBili  x   /  AST  60<H>  /  ALT  47<H>  /  AlkPhos  38<L>  04-01  TPro  7.2  /  Alb  3.6  /  TBili  0.4  /  DBili  x   /  AST  42<H>  /  ALT  33  /  AlkPhos  34<L>  03-31    CAPILLARY BLOOD GLUCOSE      POCT Blood Glucose.: 322 mg/dL (03 Apr 2020 12:17)  POCT Blood Glucose.: 302 mg/dL (03 Apr 2020 07:47)  POCT Blood Glucose.: 260 mg/dL (02 Apr 2020 23:38)  POCT Blood Glucose.: 289 mg/dL (02 Apr 2020 22:29)  POCT Blood Glucose.: 290 mg/dL (02 Apr 2020 17:33)      LIVER FUNCTIONS - ( 03 Apr 2020 06:05 )  Alb: 3.0 g/dL / Pro: 7.4 g/dL / ALK PHOS: 54 u/L / ALT: 41 u/L / AST: 47 u/L / GGT: x               < from: Xray Chest 1 View- PORTABLE-Urgent (03.31.20 @ 02:54) >    EXAM:  XR CHEST PORTABLE URGENT 1V        PROCEDURE DATE:  Mar 31 2020         INTERPRETATION:  CLINICAL INFORMATION: Fever and shortness of breath.    EXAM: 1 view of the chest.    COMPARISON: None.    FINDINGS:    Low lung volumes limiting evaluation. Patchy bilateral airspace opacities. No pneumothorax. The heart is enlarged.     IMPRESSION: Limited study but suspicion for opacities consistent with covid 19 infection.                CANDI NASH M.D., RADIOLOGY RESIDENT  This documenthas been electronically signed.  ANDREZ VERNON M.D., ATTENDING RADIOLOGIST  This document has been electronically signed. Mar 31 2020  7:09AM                < end of copied text >        RECENT CULTURES:        RESPIRATORY CULTURES:          Studies  Chest X-RAY  CT SCAN Chest   Venous Dopplers: LE:   CT Abdomen  Others

## 2020-04-03 NOTE — PROGRESS NOTE ADULT - PROBLEM SELECTOR PLAN 1
Cont ankinra as well as steroids and plaque nil: try to decrease oxygen: dvt prophylaxis  4/3: crp is decreasing: cont anakinra: follow up inflammatory markers on sun day:

## 2020-04-03 NOTE — PROGRESS NOTE ADULT - SUBJECTIVE AND OBJECTIVE BOX
CARDIOLOGY FOLLOW UP - Dr. Piña    CC pt. improving, no acute events overnight  EKG reviewed  remains on non-rebreather      PHYSICAL EXAM:  T(C): 36.9 (04-03-20 @ 01:04), Max: 37.2 (04-02-20 @ 17:56)  HR: 89 (04-03-20 @ 05:14) (85 - 93)  BP: 164/96 (04-03-20 @ 05:14) (136/89 - 180/109)  RR: 21 (04-03-20 @ 05:14) (19 - 21)  SpO2: 97% (04-03-20 @ 05:14) (92% - 97%)  Wt(kg): --  I&O's Summary      Appearance: Normal	  Cardiovascular: Normal S1 S2,RRR, No JVD, No murmurs  Respiratory: Lungs clear to auscultation	  Gastrointestinal:  Soft, Non-tender, + BS	  Extremities: Normal range of motion, No clubbing, cyanosis or edema        MEDICATIONS  (STANDING):  ALBUTerol    90 MICROgram(s) HFA Inhaler 1 Puff(s) Inhalation every 4 hours  amLODIPine   Tablet 10 milliGRAM(s) Oral daily  anakinra Injectable 100 milliGRAM(s) SubCutaneous <User Schedule>  ascorbic acid IVPB 1500 milliGRAM(s) IV Intermittent every 6 hours  azithromycin   Tablet 500 milliGRAM(s) Oral every 24 hours  carvedilol 3.125 milliGRAM(s) Oral every 12 hours  dextrose 5%. 1000 milliLiter(s) (50 mL/Hr) IV Continuous <Continuous>  dextrose 50% Injectable 12.5 Gram(s) IV Push once  dextrose 50% Injectable 25 Gram(s) IV Push once  dextrose 50% Injectable 25 Gram(s) IV Push once  heparin  Injectable 5000 Unit(s) SubCutaneous every 12 hours  hydroxychloroquine 200 milliGRAM(s) Oral every 12 hours  hydroxychloroquine   Oral   influenza   Vaccine 0.5 milliLiter(s) IntraMuscular once  insulin glargine Injectable (LANTUS) 10 Unit(s) SubCutaneous at bedtime  insulin lispro (HumaLOG) corrective regimen sliding scale   SubCutaneous three times a day before meals  losartan 100 milliGRAM(s) Oral daily  methylPREDNISolone sodium succinate Injectable 60 milliGRAM(s) IV Push two times a day  thiamine 200 milliGRAM(s) Oral <User Schedule>      TELEMETRY: 	    ECG:  NSR hr 73, corrected qtc aprox 440	  RADIOLOGY:    DIAGNOSTIC TESTING:  [ ] Echocardiogram:  [ ]  Catheterization:  [ ] Stress Test:    OTHER: 	    LABS:	 	                                12.8   14.06 )-----------( 476      ( 03 Apr 2020 06:05 )             40.0     04-03    134<L>  |  92<L>  |  33<H>  ----------------------------<  316<H>  4.1   |  29  |  1.23    Ca    9.0      03 Apr 2020 06:05    TPro  7.4  /  Alb  3.0<L>  /  TBili  0.5  /  DBili  x   /  AST  47<H>  /  ALT  41  /  AlkPhos  54  04-03 CARDIOLOGY FOLLOW UP - Dr. Piña    CC pt. improving, no acute events overnight  remains on non-rebreather      PHYSICAL EXAM:  T(C): 36.9 (04-03-20 @ 01:04), Max: 37.2 (04-02-20 @ 17:56)  HR: 89 (04-03-20 @ 05:14) (85 - 93)  BP: 164/96 (04-03-20 @ 05:14) (136/89 - 180/109)  RR: 21 (04-03-20 @ 05:14) (19 - 21)  SpO2: 97% (04-03-20 @ 05:14) (92% - 97%)  Wt(kg): --  I&O's Summary      Appearance: Normal	  Cardiovascular: Normal S1 S2,RRR, No JVD, No murmurs  Respiratory: Lungs clear to auscultation	  Gastrointestinal:  Soft, Non-tender, + BS	  Extremities: Normal range of motion, No clubbing, cyanosis or edema        MEDICATIONS  (STANDING):  ALBUTerol    90 MICROgram(s) HFA Inhaler 1 Puff(s) Inhalation every 4 hours  amLODIPine   Tablet 10 milliGRAM(s) Oral daily  anakinra Injectable 100 milliGRAM(s) SubCutaneous <User Schedule>  ascorbic acid IVPB 1500 milliGRAM(s) IV Intermittent every 6 hours  azithromycin   Tablet 500 milliGRAM(s) Oral every 24 hours  carvedilol 3.125 milliGRAM(s) Oral every 12 hours  dextrose 5%. 1000 milliLiter(s) (50 mL/Hr) IV Continuous <Continuous>  dextrose 50% Injectable 12.5 Gram(s) IV Push once  dextrose 50% Injectable 25 Gram(s) IV Push once  dextrose 50% Injectable 25 Gram(s) IV Push once  heparin  Injectable 5000 Unit(s) SubCutaneous every 12 hours  hydroxychloroquine 200 milliGRAM(s) Oral every 12 hours  hydroxychloroquine   Oral   influenza   Vaccine 0.5 milliLiter(s) IntraMuscular once  insulin glargine Injectable (LANTUS) 10 Unit(s) SubCutaneous at bedtime  insulin lispro (HumaLOG) corrective regimen sliding scale   SubCutaneous three times a day before meals  losartan 100 milliGRAM(s) Oral daily  methylPREDNISolone sodium succinate Injectable 60 milliGRAM(s) IV Push two times a day  thiamine 200 milliGRAM(s) Oral <User Schedule>      TELEMETRY: 	    ECG:  prolonged QT 511msec	  RADIOLOGY:    DIAGNOSTIC TESTING:  [ ] Echocardiogram:  [ ]  Catheterization:  [ ] Stress Test:    OTHER: 	    LABS:	 	                                12.8   14.06 )-----------( 476      ( 03 Apr 2020 06:05 )             40.0     04-03    134<L>  |  92<L>  |  33<H>  ----------------------------<  316<H>  4.1   |  29  |  1.23    Ca    9.0      03 Apr 2020 06:05    TPro  7.4  /  Alb  3.0<L>  /  TBili  0.5  /  DBili  x   /  AST  47<H>  /  ALT  41  /  AlkPhos  54  04-03

## 2020-04-03 NOTE — PROGRESS NOTE ADULT - ASSESSMENT
59 y /o Male  with PMH of HTN, HLD, and DM p/w fever and fatigue x 1 week found to be COVID + as of 3/31/20.     1. Acute hypoxic respiratory failure 2/2 to COVID infection  COVID + as of 3/31/20.  supplemental O2 - on non rebreather  IV steroids   on hydroxychloroquine/abx/anakinra   Ekg noted 4/3 - QTC remain stable, continue to monitor daily EKG  pulm f/u , ID f/u   med f/u     2. HTN   bp elevated prior to am meds, continue to trend   continue low dose coreg , increase as needed   continue other meds for now     dvt ppx 59 y /o Male  with PMH of HTN, HLD, and DM p/w fever and fatigue x 1 week found to be COVID + as of 3/31/20.     1. Acute hypoxic respiratory failure 2/2 to COVID infection  COVID + as of 3/31/20.  supplemental O2 - on non rebreather  IV steroids   ekg noted to be prolonged   monitor for ectopy   if QTC increased by 50msec, would recommend stopping hydroxychloroquine   on hydroxychloroquine/abx/anakinra   pulm f/u , ID f/u   med f/u     2. HTN   bp elevated   increase coreg to 6.25 mg Q12h  continue other meds for now     dvt ppx

## 2020-04-03 NOTE — PROGRESS NOTE ADULT - SUBJECTIVE AND OBJECTIVE BOX
CHIEF COMPLAINT:Patient is a 59y old  Male who presents with a chief complaint of sob (02 Apr 2020 15:08)    	        PAST MEDICAL & SURGICAL HISTORY:  HLD (hyperlipidemia)  Diabetes mellitus  HTN (hypertension)          REVIEW OF SYSTEMS:  weak  fels slightly better  more awake /alert  CARDIOVASCULAR: No chest pain, palpitations, passing out, dizziness,   GASTROINTESTINAL: No abdominal or epigastric pain. No nausea, vomiting, or hematemesis; No diarrhea or constipation. No melena or hematochezia.  GENITOURINARY: No dysuria, frequency, hematuria, or incontinence  NEUROLOGICAL: No headaches, memory loss,     Medications:  MEDICATIONS  (STANDING):  ALBUTerol    90 MICROgram(s) HFA Inhaler 1 Puff(s) Inhalation every 4 hours  amLODIPine   Tablet 10 milliGRAM(s) Oral daily  anakinra Injectable 100 milliGRAM(s) SubCutaneous <User Schedule>  ascorbic acid IVPB 1500 milliGRAM(s) IV Intermittent every 6 hours  azithromycin   Tablet 500 milliGRAM(s) Oral every 24 hours  carvedilol 3.125 milliGRAM(s) Oral every 12 hours  dextrose 5%. 1000 milliLiter(s) (50 mL/Hr) IV Continuous <Continuous>  dextrose 50% Injectable 12.5 Gram(s) IV Push once  dextrose 50% Injectable 25 Gram(s) IV Push once  dextrose 50% Injectable 25 Gram(s) IV Push once  heparin  Injectable 5000 Unit(s) SubCutaneous every 8 hours  hydroxychloroquine 200 milliGRAM(s) Oral every 12 hours  hydroxychloroquine   Oral   influenza   Vaccine 0.5 milliLiter(s) IntraMuscular once  insulin glargine Injectable (LANTUS) 15 Unit(s) SubCutaneous at bedtime  insulin lispro (HumaLOG) corrective regimen sliding scale   SubCutaneous three times a day before meals  losartan 100 milliGRAM(s) Oral daily  methylPREDNISolone sodium succinate Injectable 60 milliGRAM(s) IV Push two times a day  thiamine 200 milliGRAM(s) Oral <User Schedule>    MEDICATIONS  (PRN):  acetaminophen   Tablet .. 650 milliGRAM(s) Oral every 6 hours PRN Temp greater or equal to 38.5C (101.3F)  dextrose 40% Gel 15 Gram(s) Oral once PRN Blood Glucose LESS THAN 70 milliGRAM(s)/deciliter  glucagon  Injectable 1 milliGRAM(s) IntraMuscular once PRN Glucose LESS THAN 70 milligrams/deciliter    	    PHYSICAL EXAM:  T(C): 36.8 (04-03-20 @ 13:23), Max: 37.2 (04-02-20 @ 17:56)  HR: 75 (04-03-20 @ 13:23) (75 - 89)  BP: 148/87 (04-03-20 @ 13:23) (148/87 - 180/109)  RR: 20 (04-03-20 @ 13:23) (20 - 21)  SpO2: 99% (04-03-20 @ 13:23) (97% - 99%)  Wt(kg): --  I&O's Summary      Appearance: Normal	  HEENT:   Normal oral mucosa, PERRL, EOMI	  Lymphatic: No lymphadenopathy  Cardiovascular: Normal S1 S2, No JVD, No murmurs, No edema  Respiratory: dec bs   Psychiatry: A & O   Gastrointestinal:  Soft, Non-tender, + BS	  Skin: No rashes, No ecchymoses, No cyanosis	  Neurologic: Non-focal  Extremities: Normal range of motion, No clubbing, cyanosis or edema  Vascular: Peripheral pulses palpable 2+ bilaterally    TELEMETRY: 	    ECG:  	  RADIOLOGY:  OTHER: 	  	  LABS:	 	    CARDIAC MARKERS:                                12.8   14.06 )-----------( 476      ( 03 Apr 2020 06:05 )             40.0     04-03    134<L>  |  92<L>  |  33<H>  ----------------------------<  316<H>  4.1   |  29  |  1.23    Ca    9.0      03 Apr 2020 06:05    TPro  7.4  /  Alb  3.0<L>  /  TBili  0.5  /  DBili  x   /  AST  47<H>  /  ALT  41  /  AlkPhos  54  04-03    proBNP:   Lipid Profile:   HgA1c:   TSH:

## 2020-04-04 LAB
ALBUMIN SERPL ELPH-MCNC: 3.1 G/DL — LOW (ref 3.3–5)
ALP SERPL-CCNC: 58 U/L — SIGNIFICANT CHANGE UP (ref 40–120)
ALT FLD-CCNC: 39 U/L — SIGNIFICANT CHANGE UP (ref 4–41)
ANION GAP SERPL CALC-SCNC: 15 MMO/L — HIGH (ref 7–14)
AST SERPL-CCNC: 34 U/L — SIGNIFICANT CHANGE UP (ref 4–40)
BASOPHILS # BLD AUTO: 0.01 K/UL — SIGNIFICANT CHANGE UP (ref 0–0.2)
BASOPHILS NFR BLD AUTO: 0.1 % — SIGNIFICANT CHANGE UP (ref 0–2)
BILIRUB SERPL-MCNC: 0.5 MG/DL — SIGNIFICANT CHANGE UP (ref 0.2–1.2)
BUN SERPL-MCNC: 39 MG/DL — HIGH (ref 7–23)
CALCIUM SERPL-MCNC: 8.9 MG/DL — SIGNIFICANT CHANGE UP (ref 8.4–10.5)
CHLORIDE SERPL-SCNC: 90 MMOL/L — LOW (ref 98–107)
CO2 SERPL-SCNC: 26 MMOL/L — SIGNIFICANT CHANGE UP (ref 22–31)
CREAT SERPL-MCNC: 1.25 MG/DL — SIGNIFICANT CHANGE UP (ref 0.5–1.3)
CRP SERPL-MCNC: 47.6 MG/L — HIGH
D DIMER BLD IA.RAPID-MCNC: 3143 NG/ML — SIGNIFICANT CHANGE UP
EOSINOPHIL # BLD AUTO: 0 K/UL — SIGNIFICANT CHANGE UP (ref 0–0.5)
EOSINOPHIL NFR BLD AUTO: 0 % — SIGNIFICANT CHANGE UP (ref 0–6)
FERRITIN SERPL-MCNC: 480.2 NG/ML — HIGH (ref 30–400)
GLUCOSE BLDC GLUCOMTR-MCNC: 263 MG/DL — HIGH (ref 70–99)
GLUCOSE BLDC GLUCOMTR-MCNC: 279 MG/DL — HIGH (ref 70–99)
GLUCOSE BLDC GLUCOMTR-MCNC: 312 MG/DL — HIGH (ref 70–99)
GLUCOSE BLDC GLUCOMTR-MCNC: 341 MG/DL — HIGH (ref 70–99)
GLUCOSE BLDC GLUCOMTR-MCNC: 360 MG/DL — HIGH (ref 70–99)
GLUCOSE SERPL-MCNC: 309 MG/DL — HIGH (ref 70–99)
HCT VFR BLD CALC: 40.4 % — SIGNIFICANT CHANGE UP (ref 39–50)
HGB BLD-MCNC: 13 G/DL — SIGNIFICANT CHANGE UP (ref 13–17)
IMM GRANULOCYTES NFR BLD AUTO: 0.5 % — SIGNIFICANT CHANGE UP (ref 0–1.5)
LYMPHOCYTES # BLD AUTO: 0.77 K/UL — LOW (ref 1–3.3)
LYMPHOCYTES # BLD AUTO: 5.7 % — LOW (ref 13–44)
MCHC RBC-ENTMCNC: 26.3 PG — LOW (ref 27–34)
MCHC RBC-ENTMCNC: 32.2 % — SIGNIFICANT CHANGE UP (ref 32–36)
MCV RBC AUTO: 81.8 FL — SIGNIFICANT CHANGE UP (ref 80–100)
MONOCYTES # BLD AUTO: 0.44 K/UL — SIGNIFICANT CHANGE UP (ref 0–0.9)
MONOCYTES NFR BLD AUTO: 3.2 % — SIGNIFICANT CHANGE UP (ref 2–14)
NEUTROPHILS # BLD AUTO: 12.28 K/UL — HIGH (ref 1.8–7.4)
NEUTROPHILS NFR BLD AUTO: 90.5 % — HIGH (ref 43–77)
NRBC # FLD: 0 K/UL — SIGNIFICANT CHANGE UP (ref 0–0)
PLATELET # BLD AUTO: 480 K/UL — HIGH (ref 150–400)
PMV BLD: 9.6 FL — SIGNIFICANT CHANGE UP (ref 7–13)
POTASSIUM SERPL-MCNC: 4 MMOL/L — SIGNIFICANT CHANGE UP (ref 3.5–5.3)
POTASSIUM SERPL-SCNC: 4 MMOL/L — SIGNIFICANT CHANGE UP (ref 3.5–5.3)
PROT SERPL-MCNC: 7.5 G/DL — SIGNIFICANT CHANGE UP (ref 6–8.3)
RBC # BLD: 4.94 M/UL — SIGNIFICANT CHANGE UP (ref 4.2–5.8)
RBC # FLD: 14.1 % — SIGNIFICANT CHANGE UP (ref 10.3–14.5)
SODIUM SERPL-SCNC: 131 MMOL/L — LOW (ref 135–145)
WBC # BLD: 13.57 K/UL — HIGH (ref 3.8–10.5)
WBC # FLD AUTO: 13.57 K/UL — HIGH (ref 3.8–10.5)

## 2020-04-04 PROCEDURE — 93010 ELECTROCARDIOGRAM REPORT: CPT

## 2020-04-04 RX ORDER — ANAKINRA 100MG/0.67
100 SYRINGE (ML) SUBCUTANEOUS EVERY 12 HOURS
Refills: 0 | Status: COMPLETED | OUTPATIENT
Start: 2020-04-04 | End: 2020-04-07

## 2020-04-04 RX ADMIN — AMLODIPINE BESYLATE 10 MILLIGRAM(S): 2.5 TABLET ORAL at 05:12

## 2020-04-04 RX ADMIN — HEPARIN SODIUM 5000 UNIT(S): 5000 INJECTION INTRAVENOUS; SUBCUTANEOUS at 11:36

## 2020-04-04 RX ADMIN — CARVEDILOL PHOSPHATE 3.12 MILLIGRAM(S): 80 CAPSULE, EXTENDED RELEASE ORAL at 05:12

## 2020-04-04 RX ADMIN — Medication 60 MILLIGRAM(S): at 11:36

## 2020-04-04 RX ADMIN — LOSARTAN POTASSIUM 100 MILLIGRAM(S): 100 TABLET, FILM COATED ORAL at 05:12

## 2020-04-04 RX ADMIN — Medication 6: at 11:36

## 2020-04-04 RX ADMIN — Medication 100 MILLIGRAM(S): at 09:56

## 2020-04-04 RX ADMIN — Medication 103 MILLIGRAM(S): at 05:13

## 2020-04-04 RX ADMIN — Medication 100 MILLIGRAM(S): at 18:00

## 2020-04-04 RX ADMIN — INSULIN GLARGINE 15 UNIT(S): 100 INJECTION, SOLUTION SUBCUTANEOUS at 22:31

## 2020-04-04 RX ADMIN — Medication 103 MILLIGRAM(S): at 00:25

## 2020-04-04 RX ADMIN — HEPARIN SODIUM 5000 UNIT(S): 5000 INJECTION INTRAVENOUS; SUBCUTANEOUS at 05:11

## 2020-04-04 RX ADMIN — Medication 8: at 08:19

## 2020-04-04 RX ADMIN — CARVEDILOL PHOSPHATE 3.12 MILLIGRAM(S): 80 CAPSULE, EXTENDED RELEASE ORAL at 18:00

## 2020-04-04 RX ADMIN — Medication 200 MILLIGRAM(S): at 05:12

## 2020-04-04 RX ADMIN — Medication 8: at 18:00

## 2020-04-04 RX ADMIN — Medication 200 MILLIGRAM(S): at 22:31

## 2020-04-04 RX ADMIN — Medication 60 MILLIGRAM(S): at 00:25

## 2020-04-04 RX ADMIN — Medication 200 MILLIGRAM(S): at 11:36

## 2020-04-04 RX ADMIN — Medication 100 MILLIGRAM(S): at 02:04

## 2020-04-04 RX ADMIN — HEPARIN SODIUM 5000 UNIT(S): 5000 INJECTION INTRAVENOUS; SUBCUTANEOUS at 22:32

## 2020-04-04 NOTE — PROGRESS NOTE ADULT - ASSESSMENT
pt w/ symptoms c/w covid  feels better   on n/c today   pulm  f/u   steroids /+ anakinra /plaquenil  ecg daily  dvt proph  dm  fsg riss  hold oral meds/i  c/w insulin regimen  htn  c/w meds as per cards  o2/nrb   albuterol mdi  f/u inflammatory markers noted    spoke w/ domestic partner about situation

## 2020-04-04 NOTE — PROGRESS NOTE ADULT - SUBJECTIVE AND OBJECTIVE BOX
CC: no events, O2 sat stable on NRB    TELEMETRY:     PHYSICAL EXAM:    T(C): 36.9 (04-03-20 @ 22:33), Max: 36.9 (04-03-20 @ 22:33)  HR: 73 (04-03-20 @ 22:33) (73 - 75)  BP: 153/88 (04-04-20 @ 05:10) (148/87 - 158/98)  RR: 20 (04-04-20 @ 05:10) (20 - 20)  SpO2: 93% (04-04-20 @ 05:10) (92% - 99%)  Wt(kg): --  I&O's Summary      Appearance: Normal	  Cardiovascular: Normal S1 S2,RRR, No JVD, No murmurs  Respiratory: Lungs clear to auscultation	  Gastrointestinal:  Soft, Non-tender, + BS	  Extremities: Normal range of motion, No clubbing, cyanosis or edema  Vascular: Peripheral pulses palpable 2+ bilaterally     LABS:	 	                          13.0   13.57 )-----------( 480      ( 04 Apr 2020 06:30 )             40.4     04-04    131<L>  |  90<L>  |  39<H>  ----------------------------<  309<H>  4.0   |  26  |  1.25    Ca    8.9      04 Apr 2020 06:30    TPro  7.5  /  Alb  3.1<L>  /  TBili  0.5  /  DBili  x   /  AST  34  /  ALT  39  /  AlkPhos  58  04-04          CARDIAC MARKERS:

## 2020-04-04 NOTE — PROGRESS NOTE ADULT - PROBLEM SELECTOR PLAN 2
monitor andc ontrol  4/3: high secondary to steorids  4/4:cont to control: stetroids for two days more

## 2020-04-04 NOTE — PROGRESS NOTE ADULT - SUBJECTIVE AND OBJECTIVE BOX
CHIEF COMPLAINT:Patient is a 59y old  Male who presents with a chief complaint of sob (02 Apr 2020 15:08)    	        PAST MEDICAL & SURGICAL HISTORY:  HLD (hyperlipidemia)  Diabetes mellitus  HTN (hypertension)          REVIEW OF SYSTEMS:  weak  RESPIRATORY: dec sob / _+ cough  CARDIOVASCULAR: No chest pain, palpitations, passing out, dizziness,   GASTROINTESTINAL: No abdominal or epigastric pain. No nausea, vomiting, or hematemesis; No diarrhea or constipation. No melena or hematochezia.  GENITOURINARY: No dysuria, frequency, hematuria, or incontinence  NEUROLOGICAL: No headaches,     Medications:  MEDICATIONS  (STANDING):  ALBUTerol    90 MICROgram(s) HFA Inhaler 1 Puff(s) Inhalation every 4 hours  amLODIPine   Tablet 10 milliGRAM(s) Oral daily  azithromycin   Tablet 500 milliGRAM(s) Oral every 24 hours  carvedilol 3.125 milliGRAM(s) Oral every 12 hours  dextrose 5%. 1000 milliLiter(s) (50 mL/Hr) IV Continuous <Continuous>  dextrose 50% Injectable 12.5 Gram(s) IV Push once  dextrose 50% Injectable 25 Gram(s) IV Push once  dextrose 50% Injectable 25 Gram(s) IV Push once  heparin  Injectable 5000 Unit(s) SubCutaneous every 8 hours  hydroxychloroquine 200 milliGRAM(s) Oral every 12 hours  hydroxychloroquine   Oral   influenza   Vaccine 0.5 milliLiter(s) IntraMuscular once  insulin glargine Injectable (LANTUS) 15 Unit(s) SubCutaneous at bedtime  insulin lispro (HumaLOG) corrective regimen sliding scale   SubCutaneous three times a day before meals  losartan 100 milliGRAM(s) Oral daily  methylPREDNISolone sodium succinate Injectable 60 milliGRAM(s) IV Push two times a day    MEDICATIONS  (PRN):  acetaminophen   Tablet .. 650 milliGRAM(s) Oral every 6 hours PRN Temp greater or equal to 38.5C (101.3F)  dextrose 40% Gel 15 Gram(s) Oral once PRN Blood Glucose LESS THAN 70 milliGRAM(s)/deciliter  glucagon  Injectable 1 milliGRAM(s) IntraMuscular once PRN Glucose LESS THAN 70 milligrams/deciliter    	    PHYSICAL EXAM:  T(C): 36.9 (04-03-20 @ 22:33), Max: 36.9 (04-03-20 @ 22:33)  HR: 73 (04-03-20 @ 22:33) (73 - 75)  BP: 153/88 (04-04-20 @ 05:10) (148/87 - 158/98)  RR: 20 (04-04-20 @ 05:10) (20 - 20)  SpO2: 93% (04-04-20 @ 05:10) (92% - 99%)  Wt(kg): --  I&O's Summary      Appearance: Normal	  HEENT:   Normal oral mucosa, PERRL, EOMI	  Lymphatic: No lymphadenopathy  Cardiovascular: Normal S1 S2, No JVD, No murmurs, No edema  Respiratory: dec bs 	  Psychiatry: A & O   Gastrointestinal:  Soft, Non-tender, + BS	  Skin: No rashes, No ecchymoses, No cyanosis	  Neurologic: Non-focal  Extremities: Normal range of motion, No clubbing, cyanosis or edema  Vascular: Peripheral pulses palpable 2+ bilaterally    TELEMETRY: 	    ECG:  	  RADIOLOGY:  OTHER: 	  	  LABS:	 	    CARDIAC MARKERS:                                13.0   13.57 )-----------( 480      ( 04 Apr 2020 06:30 )             40.4     04-04    131<L>  |  90<L>  |  39<H>  ----------------------------<  309<H>  4.0   |  26  |  1.25    Ca    8.9      04 Apr 2020 06:30    TPro  7.5  /  Alb  3.1<L>  /  TBili  0.5  /  DBili  x   /  AST  34  /  ALT  39  /  AlkPhos  58  04-04    proBNP:   Lipid Profile:   HgA1c:   TSH:

## 2020-04-04 NOTE — PROGRESS NOTE ADULT - SUBJECTIVE AND OBJECTIVE BOX
Patient is a 59y old  Male who presents with a chief complaint of sob (02 Apr 2020 15:08)      Any change in ROS:   on 100% L nrb: when switched to nasal cannula: but desaturted  MEDICATIONS  (STANDING):  ALBUTerol    90 MICROgram(s) HFA Inhaler 1 Puff(s) Inhalation every 4 hours  amLODIPine   Tablet 10 milliGRAM(s) Oral daily  carvedilol 3.125 milliGRAM(s) Oral every 12 hours  dextrose 5%. 1000 milliLiter(s) (50 mL/Hr) IV Continuous <Continuous>  dextrose 50% Injectable 12.5 Gram(s) IV Push once  dextrose 50% Injectable 25 Gram(s) IV Push once  dextrose 50% Injectable 25 Gram(s) IV Push once  heparin  Injectable 5000 Unit(s) SubCutaneous every 8 hours  hydroxychloroquine 200 milliGRAM(s) Oral every 12 hours  hydroxychloroquine   Oral   influenza   Vaccine 0.5 milliLiter(s) IntraMuscular once  insulin glargine Injectable (LANTUS) 15 Unit(s) SubCutaneous at bedtime  insulin lispro (HumaLOG) corrective regimen sliding scale   SubCutaneous three times a day before meals  losartan 100 milliGRAM(s) Oral daily  methylPREDNISolone sodium succinate Injectable 60 milliGRAM(s) IV Push two times a day    MEDICATIONS  (PRN):  acetaminophen   Tablet .. 650 milliGRAM(s) Oral every 6 hours PRN Temp greater or equal to 38.5C (101.3F)  dextrose 40% Gel 15 Gram(s) Oral once PRN Blood Glucose LESS THAN 70 milliGRAM(s)/deciliter  glucagon  Injectable 1 milliGRAM(s) IntraMuscular once PRN Glucose LESS THAN 70 milligrams/deciliter    Vital Signs Last 24 Hrs  T(C): 36.8 (04 Apr 2020 13:29), Max: 36.9 (03 Apr 2020 22:33)  T(F): 98.2 (04 Apr 2020 13:29), Max: 98.4 (03 Apr 2020 22:33)  HR: 76 (04 Apr 2020 13:29) (73 - 76)  BP: 155/98 (04 Apr 2020 13:29) (152/98 - 158/98)  BP(mean): --  RR: 20 (04 Apr 2020 13:29) (20 - 20)  SpO2: 89% (04 Apr 2020 13:29) (89% - 93%)    I&O's Summary        Physical Exam:   GENERAL: NAD, well-groomed, well-developed  HEENT: JILL/   Atraumatic, Normocephalic  ENMT: No tonsillar erythema, exudates, or enlargement; Moist mucous membranes, Good dentition, No lesions  NECK: Supple, No JVD, Normal thyroid  CHEST/LUNG: Clear to auscultaion, ; No rales, rhonchi, wheezing, or rubs  CVS: Regular rate and rhythm; No murmurs, rubs, or gallops  GI: : Soft, Nontender, Nondistended; Bowel sounds present  NERVOUS SYSTEM:  Alert & Oriented X3, Good concentration; Motor Strength 5/5 B/L upper and lower extremities; DTRs 2+ intact and symmetric  EXTREMITIES:  2+ Peripheral Pulses, No clubbing, cyanosis, or edema  LYMPH: No lymphadenopathy noted  SKIN: No rashes or lesions  ENDOCRINOLOGY: No Thyromegaly  PSYCH: Appropriate    Labs:                              13.0   13.57 )-----------( 480      ( 04 Apr 2020 06:30 )             40.4                         12.8   14.06 )-----------( 476      ( 03 Apr 2020 06:05 )             40.0                         13.4   11.62 )-----------( 468      ( 02 Apr 2020 05:25 )             40.7                         13.0   9.03  )-----------( 392      ( 01 Apr 2020 05:45 )             39.6     04-04    131<L>  |  90<L>  |  39<H>  ----------------------------<  309<H>  4.0   |  26  |  1.25  04-03    134<L>  |  92<L>  |  33<H>  ----------------------------<  316<H>  4.1   |  29  |  1.23  04-02    132<L>  |  92<L>  |  24<H>  ----------------------------<  296<H>  3.9   |  27  |  1.21  04-01    136  |  95<L>  |  20  ----------------------------<  146<H>  3.9   |  26  |  1.24    Ca    8.9      04 Apr 2020 06:30  Ca    9.0      03 Apr 2020 06:05    TPro  7.5  /  Alb  3.1<L>  /  TBili  0.5  /  DBili  x   /  AST  34  /  ALT  39  /  AlkPhos  58  04-04  TPro  7.4  /  Alb  3.0<L>  /  TBili  0.5  /  DBili  x   /  AST  47<H>  /  ALT  41  /  AlkPhos  54  04-03  TPro  7.8  /  Alb  3.0<L>  /  TBili  0.7  /  DBili  x   /  AST  55<H>  /  ALT  47<H>  /  AlkPhos  46  04-02  TPro  7.4  /  Alb  2.8<L>  /  TBili  0.7  /  DBili  x   /  AST  60<H>  /  ALT  47<H>  /  AlkPhos  38<L>  04-01    CAPILLARY BLOOD GLUCOSE      POCT Blood Glucose.: 279 mg/dL (04 Apr 2020 12:20)  POCT Blood Glucose.: 341 mg/dL (04 Apr 2020 07:51)  POCT Blood Glucose.: 376 mg/dL (03 Apr 2020 21:05)  POCT Blood Glucose.: 301 mg/dL (03 Apr 2020 18:19)  POCT Blood Glucose.: 265 mg/dL (03 Apr 2020 17:05)      LIVER FUNCTIONS - ( 04 Apr 2020 06:30 )  Alb: 3.1 g/dL / Pro: 7.5 g/dL / ALK PHOS: 58 u/L / ALT: 39 u/L / AST: 34 u/L / GGT: x               D-Dimer Assay, Quantitative: 3143 ng/mL (04-04 @ 06:30)        RECENT CULTURES:  < from: Xray Chest 1 View- PORTABLE-Urgent (03.31.20 @ 02:54) >    EXAM:  XR CHEST PORTABLE URGENT 1V        PROCEDURE DATE:  Mar 31 2020         INTERPRETATION:  CLINICAL INFORMATION: Fever and shortness of breath.    EXAM: 1 view of the chest.    COMPARISON: None.    FINDINGS:    Low lung volumes limiting evaluation. Patchy bilateral airspace opacities. No pneumothorax. The heart is enlarged.     IMPRESSION: Limited study but suspicion for opacities consistent with covid 19 infection.                CANDI NASH M.D., RADIOLOGY RESIDENT  This documenthas been electronically signed.  ANDREZ VERNON M.D., ATTENDING RADIOLOGIST  This document has been electronically signed. Mar 31 2020  7:09AM                < end of copied text >        RESPIRATORY CULTURES:          Studies  Chest X-RAY  CT SCAN Chest   Venous Dopplers: LE:   CT Abdomen  Others

## 2020-04-04 NOTE — PROGRESS NOTE ADULT - ASSESSMENT
59 y /o Male  with PMH of HTN, HLD, and DM p/w fever and fatigue x 1 week found to be COVID + as of 3/31/20.     1. Acute hypoxic respiratory failure 2/2 to COVID infection  COVID + as of 3/31/20.  supplemental O2 - on non rebreather  IV steroids   ekg noted to be prolonged   monitor for ectopy   QTc 480msec, cont to monitor   on hydroxychloroquine/abx/anakinra   pulm f/u , ID f/u   med f/u     2. HTN   bp elevated   cont coreg to 6.25 mg Q12h  continue other meds for now     dvt ppx

## 2020-04-04 NOTE — PROGRESS NOTE ADULT - PROBLEM SELECTOR PLAN 1
Cont ankinra as well as steroids and plaque nil: try to decrease oxygen: dvt prophylaxis  4/3: crp is decreasing: cont anakinra: follow up inflammatory markers on sun day:  4/4: inflammatory markers are still elevated: would cont anakinra: bid dosages now: he seems to be getting better

## 2020-04-05 LAB
ALBUMIN SERPL ELPH-MCNC: 3 G/DL — LOW (ref 3.3–5)
ALP SERPL-CCNC: 52 U/L — SIGNIFICANT CHANGE UP (ref 40–120)
ALT FLD-CCNC: 34 U/L — SIGNIFICANT CHANGE UP (ref 4–41)
ANION GAP SERPL CALC-SCNC: 13 MMO/L — SIGNIFICANT CHANGE UP (ref 7–14)
AST SERPL-CCNC: 24 U/L — SIGNIFICANT CHANGE UP (ref 4–40)
BASOPHILS # BLD AUTO: 0.01 K/UL — SIGNIFICANT CHANGE UP (ref 0–0.2)
BASOPHILS NFR BLD AUTO: 0.1 % — SIGNIFICANT CHANGE UP (ref 0–2)
BILIRUB SERPL-MCNC: 0.5 MG/DL — SIGNIFICANT CHANGE UP (ref 0.2–1.2)
BUN SERPL-MCNC: 42 MG/DL — HIGH (ref 7–23)
CALCIUM SERPL-MCNC: 8.7 MG/DL — SIGNIFICANT CHANGE UP (ref 8.4–10.5)
CHLORIDE SERPL-SCNC: 92 MMOL/L — LOW (ref 98–107)
CO2 SERPL-SCNC: 28 MMOL/L — SIGNIFICANT CHANGE UP (ref 22–31)
CREAT SERPL-MCNC: 1.27 MG/DL — SIGNIFICANT CHANGE UP (ref 0.5–1.3)
CRP SERPL-MCNC: 20.2 MG/L — HIGH
D DIMER BLD IA.RAPID-MCNC: 3911 NG/ML — SIGNIFICANT CHANGE UP
EOSINOPHIL # BLD AUTO: 0 K/UL — SIGNIFICANT CHANGE UP (ref 0–0.5)
EOSINOPHIL NFR BLD AUTO: 0 % — SIGNIFICANT CHANGE UP (ref 0–6)
FERRITIN SERPL-MCNC: 448.8 NG/ML — HIGH (ref 30–400)
GLUCOSE BLDC GLUCOMTR-MCNC: 218 MG/DL — HIGH (ref 70–99)
GLUCOSE BLDC GLUCOMTR-MCNC: 276 MG/DL — HIGH (ref 70–99)
GLUCOSE BLDC GLUCOMTR-MCNC: 301 MG/DL — HIGH (ref 70–99)
GLUCOSE BLDC GLUCOMTR-MCNC: 331 MG/DL — HIGH (ref 70–99)
GLUCOSE SERPL-MCNC: 333 MG/DL — HIGH (ref 70–99)
HCT VFR BLD CALC: 39.7 % — SIGNIFICANT CHANGE UP (ref 39–50)
HGB BLD-MCNC: 13.2 G/DL — SIGNIFICANT CHANGE UP (ref 13–17)
IMM GRANULOCYTES NFR BLD AUTO: 1 % — SIGNIFICANT CHANGE UP (ref 0–1.5)
LYMPHOCYTES # BLD AUTO: 0.74 K/UL — LOW (ref 1–3.3)
LYMPHOCYTES # BLD AUTO: 5.8 % — LOW (ref 13–44)
MCHC RBC-ENTMCNC: 26.9 PG — LOW (ref 27–34)
MCHC RBC-ENTMCNC: 33.2 % — SIGNIFICANT CHANGE UP (ref 32–36)
MCV RBC AUTO: 80.9 FL — SIGNIFICANT CHANGE UP (ref 80–100)
MONOCYTES # BLD AUTO: 0.6 K/UL — SIGNIFICANT CHANGE UP (ref 0–0.9)
MONOCYTES NFR BLD AUTO: 4.7 % — SIGNIFICANT CHANGE UP (ref 2–14)
NEUTROPHILS # BLD AUTO: 11.25 K/UL — HIGH (ref 1.8–7.4)
NEUTROPHILS NFR BLD AUTO: 88.4 % — HIGH (ref 43–77)
NRBC # FLD: 0 K/UL — SIGNIFICANT CHANGE UP (ref 0–0)
PLATELET # BLD AUTO: 427 K/UL — HIGH (ref 150–400)
PMV BLD: 9.9 FL — SIGNIFICANT CHANGE UP (ref 7–13)
POTASSIUM SERPL-MCNC: 4 MMOL/L — SIGNIFICANT CHANGE UP (ref 3.5–5.3)
POTASSIUM SERPL-SCNC: 4 MMOL/L — SIGNIFICANT CHANGE UP (ref 3.5–5.3)
PROT SERPL-MCNC: 7 G/DL — SIGNIFICANT CHANGE UP (ref 6–8.3)
RBC # BLD: 4.91 M/UL — SIGNIFICANT CHANGE UP (ref 4.2–5.8)
RBC # FLD: 13.8 % — SIGNIFICANT CHANGE UP (ref 10.3–14.5)
SODIUM SERPL-SCNC: 133 MMOL/L — LOW (ref 135–145)
WBC # BLD: 12.73 K/UL — HIGH (ref 3.8–10.5)
WBC # FLD AUTO: 12.73 K/UL — HIGH (ref 3.8–10.5)

## 2020-04-05 RX ADMIN — LOSARTAN POTASSIUM 100 MILLIGRAM(S): 100 TABLET, FILM COATED ORAL at 06:37

## 2020-04-05 RX ADMIN — HEPARIN SODIUM 5000 UNIT(S): 5000 INJECTION INTRAVENOUS; SUBCUTANEOUS at 12:21

## 2020-04-05 RX ADMIN — HEPARIN SODIUM 5000 UNIT(S): 5000 INJECTION INTRAVENOUS; SUBCUTANEOUS at 21:56

## 2020-04-05 RX ADMIN — CARVEDILOL PHOSPHATE 3.12 MILLIGRAM(S): 80 CAPSULE, EXTENDED RELEASE ORAL at 06:37

## 2020-04-05 RX ADMIN — Medication 60 MILLIGRAM(S): at 00:06

## 2020-04-05 RX ADMIN — AMLODIPINE BESYLATE 10 MILLIGRAM(S): 2.5 TABLET ORAL at 06:37

## 2020-04-05 RX ADMIN — INSULIN GLARGINE 15 UNIT(S): 100 INJECTION, SOLUTION SUBCUTANEOUS at 21:56

## 2020-04-05 RX ADMIN — Medication 4: at 17:30

## 2020-04-05 RX ADMIN — HEPARIN SODIUM 5000 UNIT(S): 5000 INJECTION INTRAVENOUS; SUBCUTANEOUS at 06:37

## 2020-04-05 RX ADMIN — Medication 8: at 08:26

## 2020-04-05 RX ADMIN — Medication 100 MILLIGRAM(S): at 06:37

## 2020-04-05 RX ADMIN — Medication 100 MILLIGRAM(S): at 17:29

## 2020-04-05 RX ADMIN — Medication 8: at 12:19

## 2020-04-05 RX ADMIN — CARVEDILOL PHOSPHATE 3.12 MILLIGRAM(S): 80 CAPSULE, EXTENDED RELEASE ORAL at 17:30

## 2020-04-05 NOTE — PROGRESS NOTE ADULT - SUBJECTIVE AND OBJECTIVE BOX
CARDIOLOGY FOLLOW UP NOTE - DR. DOBBINS    Subjective:    improved sob, cough  ROS: otherwise negative   overnight events:       PHYSICAL EXAM:  T(C): 36.4 (04-05-20 @ 12:48), Max: 36.7 (04-04-20 @ 22:20)  HR: 83 (04-05-20 @ 12:48) (69 - 83)  BP: 149/96 (04-05-20 @ 12:48) (146/98 - 171/93)  RR: 20 (04-05-20 @ 12:48) (20 - 21)  SpO2: 96% (04-05-20 @ 12:48) (90% - 96%)  Wt(kg): --  I&O's Summary    Daily     Daily     Appearance: Normal	  Cardiovascular: Normal S1 S2,RRR, No JVD, No murmurs  Respiratory: Lungs clear to auscultation	  Gastrointestinal:  Soft, Non-tender, + BS	  Extremities: Normal range of motion, No clubbing, cyanosis or edema      Home Medications:      MEDICATIONS  (STANDING):  ALBUTerol    90 MICROgram(s) HFA Inhaler 1 Puff(s) Inhalation every 4 hours  amLODIPine   Tablet 10 milliGRAM(s) Oral daily  anakinra Injectable 100 milliGRAM(s) SubCutaneous every 12 hours  carvedilol 3.125 milliGRAM(s) Oral every 12 hours  dextrose 5%. 1000 milliLiter(s) (50 mL/Hr) IV Continuous <Continuous>  dextrose 50% Injectable 12.5 Gram(s) IV Push once  dextrose 50% Injectable 25 Gram(s) IV Push once  dextrose 50% Injectable 25 Gram(s) IV Push once  heparin  Injectable 5000 Unit(s) SubCutaneous every 8 hours  influenza   Vaccine 0.5 milliLiter(s) IntraMuscular once  insulin glargine Injectable (LANTUS) 15 Unit(s) SubCutaneous at bedtime  insulin lispro (HumaLOG) corrective regimen sliding scale   SubCutaneous three times a day before meals  losartan 100 milliGRAM(s) Oral daily      TELEMETRY: 	    ECG:  	  RADIOLOGY:   DIAGNOSTIC TESTING:  [ ] Echocardiogram:  [ ] Catheterization:  [ ] Stress Test:    OTHER: 	    LABS:	 	    CARDIAC MARKERS:                                13.2   12.73 )-----------( 427      ( 05 Apr 2020 06:12 )             39.7     04-05    133<L>  |  92<L>  |  42<H>  ----------------------------<  333<H>  4.0   |  28  |  1.27    Ca    8.7      05 Apr 2020 06:13    TPro  7.0  /  Alb  3.0<L>  /  TBili  0.5  /  DBili  x   /  AST  24  /  ALT  34  /  AlkPhos  52  04-05    proBNP:     Lipid Profile:   HgA1c:     Creatinine, Serum: 1.27 mg/dL (04-05-20 @ 06:13)  Creatinine, Serum: 1.25 mg/dL (04-04-20 @ 06:30)  Creatinine, Serum: 1.23 mg/dL (04-03-20 @ 06:05)

## 2020-04-05 NOTE — PROGRESS NOTE ADULT - ASSESSMENT
pt w/ symptoms c/w covid  feels better   on n/c /tolerating/ w/ good sats  pulm  to  f/u   steroids over   /+ anakinra /  s/p plaquenil  dvt proph  dm  fsg riss  hold oral meds/  c/w insulin regimen  htn  c/w meds as per cards  o2/nrb   albuterol mdi  f/u inflammatory markers noted    spoke w/ domestic partner about situation

## 2020-04-05 NOTE — PROGRESS NOTE ADULT - ASSESSMENT
pt w/ symptoms c/w covid    COVID PNEUMONIA    on 13 L of nasal cannula  D5 of anakinra: bid dosing:  finished steroids as well as Plaquenil  D D WENDY: CRP AND FERRITIN ALL HIGH:  FOLLOW UP IN AM   DVT PROPAHYXLIS     DM    UNCONTROLLED: PER pmd    HTN  CONTROLLED

## 2020-04-05 NOTE — PROGRESS NOTE ADULT - SUBJECTIVE AND OBJECTIVE BOX
Patient is a 59y old  Male who presents with a chief complaint of resp failure (04 Apr 2020 15:34)      Any change in ROS: seems better:  on 13l of oxygen: he seems better     MEDICATIONS  (STANDING):  ALBUTerol    90 MICROgram(s) HFA Inhaler 1 Puff(s) Inhalation every 4 hours  amLODIPine   Tablet 10 milliGRAM(s) Oral daily  anakinra Injectable 100 milliGRAM(s) SubCutaneous every 12 hours  carvedilol 3.125 milliGRAM(s) Oral every 12 hours  dextrose 5%. 1000 milliLiter(s) (50 mL/Hr) IV Continuous <Continuous>  dextrose 50% Injectable 12.5 Gram(s) IV Push once  dextrose 50% Injectable 25 Gram(s) IV Push once  dextrose 50% Injectable 25 Gram(s) IV Push once  heparin  Injectable 5000 Unit(s) SubCutaneous every 8 hours  influenza   Vaccine 0.5 milliLiter(s) IntraMuscular once  insulin glargine Injectable (LANTUS) 15 Unit(s) SubCutaneous at bedtime  insulin lispro (HumaLOG) corrective regimen sliding scale   SubCutaneous three times a day before meals  losartan 100 milliGRAM(s) Oral daily    MEDICATIONS  (PRN):  acetaminophen   Tablet .. 650 milliGRAM(s) Oral every 6 hours PRN Temp greater or equal to 38.5C (101.3F)  dextrose 40% Gel 15 Gram(s) Oral once PRN Blood Glucose LESS THAN 70 milliGRAM(s)/deciliter  glucagon  Injectable 1 milliGRAM(s) IntraMuscular once PRN Glucose LESS THAN 70 milligrams/deciliter    Vital Signs Last 24 Hrs  T(C): 36.4 (05 Apr 2020 12:48), Max: 36.7 (04 Apr 2020 22:20)  T(F): 97.6 (05 Apr 2020 12:48), Max: 98 (04 Apr 2020 22:20)  HR: 83 (05 Apr 2020 12:48) (69 - 83)  BP: 149/96 (05 Apr 2020 12:48) (146/98 - 171/93)  BP(mean): --  RR: 20 (05 Apr 2020 12:48) (20 - 21)  SpO2: 96% (05 Apr 2020 12:48) (90% - 96%)    I&O's Summary        Physical Exam:   GENERAL: NAD, well-groomed, well-developed  HEENT: JILL/   Atraumatic, Normocephalic  ENMT: No tonsillar erythema, exudates, or enlargement; Moist mucous membranes, Good dentition, No lesions  NECK: Supple, No JVD, Normal thyroid  CHEST/LUNG: Clear to auscultaion, ; No rales, rhonchi, wheezing, or rubs  CVS: Regular rate and rhythm; No murmurs, rubs, or gallops  GI: : Soft, Nontender, Nondistended; Bowel sounds present  NERVOUS SYSTEM:  Alert & Oriented X3  EXTREMITIES:  2+ Peripheral Pulses, No clubbing, cyanosis, or edema  LYMPH: No lymphadenopathy noted  SKIN: No rashes or lesions  ENDOCRINOLOGY: No Thyromegaly  PSYCH: Appropriate    Labs:                              13.2   12.73 )-----------( 427      ( 05 Apr 2020 06:12 )             39.7                         13.0   13.57 )-----------( 480      ( 04 Apr 2020 06:30 )             40.4                         12.8   14.06 )-----------( 476      ( 03 Apr 2020 06:05 )             40.0                         13.4   11.62 )-----------( 468      ( 02 Apr 2020 05:25 )             40.7     04-05    133<L>  |  92<L>  |  42<H>  ----------------------------<  333<H>  4.0   |  28  |  1.27  04-04    131<L>  |  90<L>  |  39<H>  ----------------------------<  309<H>  4.0   |  26  |  1.25  04-03    134<L>  |  92<L>  |  33<H>  ----------------------------<  316<H>  4.1   |  29  |  1.23  04-02    132<L>  |  92<L>  |  24<H>  ----------------------------<  296<H>  3.9   |  27  |  1.21    Ca    8.7      05 Apr 2020 06:13  Ca    8.9      04 Apr 2020 06:30    TPro  7.0  /  Alb  3.0<L>  /  TBili  0.5  /  DBili  x   /  AST  24  /  ALT  34  /  AlkPhos  52  04-05  TPro  7.5  /  Alb  3.1<L>  /  TBili  0.5  /  DBili  x   /  AST  34  /  ALT  39  /  AlkPhos  58  04-04  TPro  7.4  /  Alb  3.0<L>  /  TBili  0.5  /  DBili  x   /  AST  47<H>  /  ALT  41  /  AlkPhos  54  04-03  TPro  7.8  /  Alb  3.0<L>  /  TBili  0.7  /  DBili  x   /  AST  55<H>  /  ALT  47<H>  /  AlkPhos  46  04-02    CAPILLARY BLOOD GLUCOSE      POCT Blood Glucose.: 301 mg/dL (05 Apr 2020 12:02)  POCT Blood Glucose.: 331 mg/dL (05 Apr 2020 07:40)  POCT Blood Glucose.: 360 mg/dL (04 Apr 2020 21:17)  POCT Blood Glucose.: 312 mg/dL (04 Apr 2020 17:58)  POCT Blood Glucose.: 263 mg/dL (04 Apr 2020 16:49)      LIVER FUNCTIONS - ( 05 Apr 2020 06:13 )  Alb: 3.0 g/dL / Pro: 7.0 g/dL / ALK PHOS: 52 u/L / ALT: 34 u/L / AST: 24 u/L / GGT: x               D-Dimer Assay, Quantitative: 3143 ng/mL (04-04 @ 06:30)    < from: Xray Chest 1 View- PORTABLE-Urgent (03.31.20 @ 02:54) >    EXAM:  XR CHEST PORTABLE URGENT 1V        PROCEDURE DATE:  Mar 31 2020         INTERPRETATION:  CLINICAL INFORMATION: Fever and shortness of breath.    EXAM: 1 view of the chest.    COMPARISON: None.    FINDINGS:    Low lung volumes limiting evaluation. Patchy bilateral airspace opacities. No pneumothorax. The heart is enlarged.     IMPRESSION: Limited study but suspicion for opacities consistent with covid 19 infection.                CANDI NASH M.D., RADIOLOGY RESIDENT  This documenthas been electronically signed.  ANDREZ VERNON M.D., ATTENDING RADIOLOGIST  This document has been electronically signed. Mar 31 2020  7:09AM        < end of copied text >      RECENT CULTURES:        RESPIRATORY CULTURES:          Studies  Chest X-RAY  CT SCAN Chest   Venous Dopplers: LE:   CT Abdomen  Others

## 2020-04-05 NOTE — PROGRESS NOTE ADULT - SUBJECTIVE AND OBJECTIVE BOX
CHIEF COMPLAINT:Patient is a 59y old  Male who presents with a chief complaint of resp failure (04 Apr 2020 15:34)    	        PAST MEDICAL & SURGICAL HISTORY:  HLD (hyperlipidemia)  Diabetes mellitus  HTN (hypertension)          REVIEW OF SYSTEMS:  weak/ sob/ cough but feels better  CARDIOVASCULAR: No chest pain, palpitations, passing out, dizziness, or leg swelling  GASTROINTESTINAL: No abdominal or epigastric pain. No nausea, vomiting, or hematemesis; No diarrhea or constipation. No melena or hematochezia.  GENITOURINARY: No dysuria, frequency, hematuria, or incontinence  NEUROLOGICAL: No headaches,     Medications:  MEDICATIONS  (STANDING):  ALBUTerol    90 MICROgram(s) HFA Inhaler 1 Puff(s) Inhalation every 4 hours  amLODIPine   Tablet 10 milliGRAM(s) Oral daily  anakinra Injectable 100 milliGRAM(s) SubCutaneous every 12 hours  carvedilol 3.125 milliGRAM(s) Oral every 12 hours  dextrose 5%. 1000 milliLiter(s) (50 mL/Hr) IV Continuous <Continuous>  dextrose 50% Injectable 12.5 Gram(s) IV Push once  dextrose 50% Injectable 25 Gram(s) IV Push once  dextrose 50% Injectable 25 Gram(s) IV Push once  heparin  Injectable 5000 Unit(s) SubCutaneous every 8 hours  influenza   Vaccine 0.5 milliLiter(s) IntraMuscular once  insulin glargine Injectable (LANTUS) 15 Unit(s) SubCutaneous at bedtime  insulin lispro (HumaLOG) corrective regimen sliding scale   SubCutaneous three times a day before meals  losartan 100 milliGRAM(s) Oral daily    MEDICATIONS  (PRN):  acetaminophen   Tablet .. 650 milliGRAM(s) Oral every 6 hours PRN Temp greater or equal to 38.5C (101.3F)  dextrose 40% Gel 15 Gram(s) Oral once PRN Blood Glucose LESS THAN 70 milliGRAM(s)/deciliter  glucagon  Injectable 1 milliGRAM(s) IntraMuscular once PRN Glucose LESS THAN 70 milligrams/deciliter    	    PHYSICAL EXAM:  T(C): 36.7 (04-05-20 @ 02:20), Max: 36.8 (04-04-20 @ 13:29)  HR: 69 (04-05-20 @ 06:36) (69 - 80)  BP: 154/94 (04-05-20 @ 06:36) (146/98 - 171/93)  RR: 21 (04-05-20 @ 08:44) (20 - 21)  SpO2: 92% (04-05-20 @ 08:44) (89% - 94%)  Wt(kg): --  I&O's Summary      Appearance: Normal	  HEENT:   Normal oral mucosa, PERRL, EOMI	  Lymphatic: No lymphadenopathy  Cardiovascular: Normal S1 S2, No JVD, No murmurs, No edema  Respiratory:dec bs   Psychiatry: A & O x 3,   Gastrointestinal:  Soft, Non-tender, + BS	  Skin: No rashes, No ecchymoses, No cyanosis	  Neurologic: Non-focal  Extremities: Normal range of motion, No clubbing, cyanosis or edema  Vascular: Peripheral pulses palpable 2+ bilaterally    TELEMETRY: 	    ECG:  	  RADIOLOGY:  OTHER: 	  	  LABS:	 	    CARDIAC MARKERS:                                13.2   12.73 )-----------( 427      ( 05 Apr 2020 06:12 )             39.7     04-05    133<L>  |  92<L>  |  42<H>  ----------------------------<  333<H>  4.0   |  28  |  1.27    Ca    8.7      05 Apr 2020 06:13    TPro  7.0  /  Alb  3.0<L>  /  TBili  0.5  /  DBili  x   /  AST  24  /  ALT  34  /  AlkPhos  52  04-05    proBNP:   Lipid Profile:   HgA1c:   TSH:

## 2020-04-05 NOTE — PROGRESS NOTE ADULT - ASSESSMENT
59 y /o Male  with PMH of HTN, HLD, and DM p/w fever and fatigue x 1 week found to be COVID + as of 3/31/20.     1. Acute hypoxic respiratory failure 2/2 to COVID infection  -COVID + as of 3/31/20.  -supplemental O2 as needed  -s/p abx, IV steroids   -anakinra per med   -pulm f/u , ID f/u, med f/u     2. HTN  -bp imrpoved but still elevated  -cna increase coreg to 6.25mg bid   -cont other meds       dvt ppx

## 2020-04-06 LAB
ALBUMIN SERPL ELPH-MCNC: 2.9 G/DL — LOW (ref 3.3–5)
ALP SERPL-CCNC: 49 U/L — SIGNIFICANT CHANGE UP (ref 40–120)
ALT FLD-CCNC: 40 U/L — SIGNIFICANT CHANGE UP (ref 4–41)
ANION GAP SERPL CALC-SCNC: 12 MMO/L — SIGNIFICANT CHANGE UP (ref 7–14)
AST SERPL-CCNC: 27 U/L — SIGNIFICANT CHANGE UP (ref 4–40)
BASOPHILS # BLD AUTO: 0.02 K/UL — SIGNIFICANT CHANGE UP (ref 0–0.2)
BASOPHILS NFR BLD AUTO: 0.2 % — SIGNIFICANT CHANGE UP (ref 0–2)
BILIRUB SERPL-MCNC: 0.6 MG/DL — SIGNIFICANT CHANGE UP (ref 0.2–1.2)
BUN SERPL-MCNC: 36 MG/DL — HIGH (ref 7–23)
CALCIUM SERPL-MCNC: 8.7 MG/DL — SIGNIFICANT CHANGE UP (ref 8.4–10.5)
CHLORIDE SERPL-SCNC: 96 MMOL/L — LOW (ref 98–107)
CO2 SERPL-SCNC: 29 MMOL/L — SIGNIFICANT CHANGE UP (ref 22–31)
CREAT SERPL-MCNC: 1.27 MG/DL — SIGNIFICANT CHANGE UP (ref 0.5–1.3)
EOSINOPHIL # BLD AUTO: 0.25 K/UL — SIGNIFICANT CHANGE UP (ref 0–0.5)
EOSINOPHIL NFR BLD AUTO: 2.4 % — SIGNIFICANT CHANGE UP (ref 0–6)
GLUCOSE BLDC GLUCOMTR-MCNC: 155 MG/DL — HIGH (ref 70–99)
GLUCOSE BLDC GLUCOMTR-MCNC: 177 MG/DL — HIGH (ref 70–99)
GLUCOSE BLDC GLUCOMTR-MCNC: 212 MG/DL — HIGH (ref 70–99)
GLUCOSE BLDC GLUCOMTR-MCNC: 250 MG/DL — HIGH (ref 70–99)
GLUCOSE SERPL-MCNC: 196 MG/DL — HIGH (ref 70–99)
HCT VFR BLD CALC: 42.6 % — SIGNIFICANT CHANGE UP (ref 39–50)
HGB BLD-MCNC: 14 G/DL — SIGNIFICANT CHANGE UP (ref 13–17)
IMM GRANULOCYTES NFR BLD AUTO: 1.5 % — SIGNIFICANT CHANGE UP (ref 0–1.5)
LYMPHOCYTES # BLD AUTO: 1.21 K/UL — SIGNIFICANT CHANGE UP (ref 1–3.3)
LYMPHOCYTES # BLD AUTO: 11.4 % — LOW (ref 13–44)
MCHC RBC-ENTMCNC: 26.7 PG — LOW (ref 27–34)
MCHC RBC-ENTMCNC: 32.9 % — SIGNIFICANT CHANGE UP (ref 32–36)
MCV RBC AUTO: 81.1 FL — SIGNIFICANT CHANGE UP (ref 80–100)
MONOCYTES # BLD AUTO: 0.81 K/UL — SIGNIFICANT CHANGE UP (ref 0–0.9)
MONOCYTES NFR BLD AUTO: 7.7 % — SIGNIFICANT CHANGE UP (ref 2–14)
NEUTROPHILS # BLD AUTO: 8.12 K/UL — HIGH (ref 1.8–7.4)
NEUTROPHILS NFR BLD AUTO: 76.8 % — SIGNIFICANT CHANGE UP (ref 43–77)
NRBC # FLD: 0 K/UL — SIGNIFICANT CHANGE UP (ref 0–0)
PLATELET # BLD AUTO: 363 K/UL — SIGNIFICANT CHANGE UP (ref 150–400)
PMV BLD: 9.9 FL — SIGNIFICANT CHANGE UP (ref 7–13)
POTASSIUM SERPL-MCNC: 3.7 MMOL/L — SIGNIFICANT CHANGE UP (ref 3.5–5.3)
POTASSIUM SERPL-SCNC: 3.7 MMOL/L — SIGNIFICANT CHANGE UP (ref 3.5–5.3)
PROT SERPL-MCNC: 6.7 G/DL — SIGNIFICANT CHANGE UP (ref 6–8.3)
RBC # BLD: 5.25 M/UL — SIGNIFICANT CHANGE UP (ref 4.2–5.8)
RBC # FLD: 13.9 % — SIGNIFICANT CHANGE UP (ref 10.3–14.5)
SODIUM SERPL-SCNC: 137 MMOL/L — SIGNIFICANT CHANGE UP (ref 135–145)
WBC # BLD: 10.57 K/UL — HIGH (ref 3.8–10.5)
WBC # FLD AUTO: 10.57 K/UL — HIGH (ref 3.8–10.5)

## 2020-04-06 RX ORDER — ACETAMINOPHEN 500 MG
650 TABLET ORAL EVERY 4 HOURS
Refills: 0 | Status: DISCONTINUED | OUTPATIENT
Start: 2020-04-06 | End: 2020-04-15

## 2020-04-06 RX ORDER — CARVEDILOL PHOSPHATE 80 MG/1
6.25 CAPSULE, EXTENDED RELEASE ORAL EVERY 12 HOURS
Refills: 0 | Status: DISCONTINUED | OUTPATIENT
Start: 2020-04-06 | End: 2020-04-08

## 2020-04-06 RX ADMIN — INSULIN GLARGINE 15 UNIT(S): 100 INJECTION, SOLUTION SUBCUTANEOUS at 22:54

## 2020-04-06 RX ADMIN — Medication 2: at 12:42

## 2020-04-06 RX ADMIN — HEPARIN SODIUM 5000 UNIT(S): 5000 INJECTION INTRAVENOUS; SUBCUTANEOUS at 05:31

## 2020-04-06 RX ADMIN — AMLODIPINE BESYLATE 10 MILLIGRAM(S): 2.5 TABLET ORAL at 05:31

## 2020-04-06 RX ADMIN — CARVEDILOL PHOSPHATE 3.12 MILLIGRAM(S): 80 CAPSULE, EXTENDED RELEASE ORAL at 05:34

## 2020-04-06 RX ADMIN — Medication 100 MILLIGRAM(S): at 17:38

## 2020-04-06 RX ADMIN — HEPARIN SODIUM 5000 UNIT(S): 5000 INJECTION INTRAVENOUS; SUBCUTANEOUS at 22:55

## 2020-04-06 RX ADMIN — HEPARIN SODIUM 5000 UNIT(S): 5000 INJECTION INTRAVENOUS; SUBCUTANEOUS at 12:42

## 2020-04-06 RX ADMIN — LOSARTAN POTASSIUM 100 MILLIGRAM(S): 100 TABLET, FILM COATED ORAL at 05:32

## 2020-04-06 RX ADMIN — Medication 100 MILLIGRAM(S): at 05:33

## 2020-04-06 RX ADMIN — Medication 4: at 17:40

## 2020-04-06 RX ADMIN — CARVEDILOL PHOSPHATE 6.25 MILLIGRAM(S): 80 CAPSULE, EXTENDED RELEASE ORAL at 17:38

## 2020-04-06 RX ADMIN — Medication 2: at 08:36

## 2020-04-06 NOTE — PROGRESS NOTE ADULT - ASSESSMENT
pt w/ symptoms c/w covid    COVID PNEUMONIA    on 13 L of nasal cannula  D5 of anakinra: bid dosing:  finished steroids as well as Plaquenil  D D WENDY: CRP AND FERRITIN ALL HIGH:  FOLLOW UP IN AM   DVT PROPAHYXLIS     4/6:  much better : 11 100% sao2  d6 of anakinra bid dosing: crp has come down as wellas ferritin: d dimer is still high   dvt propahyxlis  cont to titrate down fio2    DM    UNCONTROLLED: PER pmd    HTN  CONTROLLED

## 2020-04-06 NOTE — PROGRESS NOTE ADULT - SUBJECTIVE AND OBJECTIVE BOX
Patient is a 59y old  Male who presents with a chief complaint of resp failure (04 Apr 2020 15:34)      Any change in ROS:     MEDICATIONS  (STANDING):  ALBUTerol    90 MICROgram(s) HFA Inhaler 1 Puff(s) Inhalation every 4 hours  amLODIPine   Tablet 10 milliGRAM(s) Oral daily  anakinra Injectable 100 milliGRAM(s) SubCutaneous every 12 hours  carvedilol 6.25 milliGRAM(s) Oral every 12 hours  dextrose 5%. 1000 milliLiter(s) (50 mL/Hr) IV Continuous <Continuous>  dextrose 50% Injectable 12.5 Gram(s) IV Push once  dextrose 50% Injectable 25 Gram(s) IV Push once  dextrose 50% Injectable 25 Gram(s) IV Push once  heparin  Injectable 5000 Unit(s) SubCutaneous every 8 hours  influenza   Vaccine 0.5 milliLiter(s) IntraMuscular once  insulin glargine Injectable (LANTUS) 15 Unit(s) SubCutaneous at bedtime  insulin lispro (HumaLOG) corrective regimen sliding scale   SubCutaneous three times a day before meals  losartan 100 milliGRAM(s) Oral daily    MEDICATIONS  (PRN):  acetaminophen   Tablet .. 650 milliGRAM(s) Oral every 4 hours PRN Temp greater or equal to 38.5C (101.3F)  dextrose 40% Gel 15 Gram(s) Oral once PRN Blood Glucose LESS THAN 70 milliGRAM(s)/deciliter  glucagon  Injectable 1 milliGRAM(s) IntraMuscular once PRN Glucose LESS THAN 70 milligrams/deciliter    Vital Signs Last 24 Hrs  T(C): 36.7 (06 Apr 2020 12:42), Max: 37.1 (05 Apr 2020 21:52)  T(F): 98.1 (06 Apr 2020 12:42), Max: 98.7 (05 Apr 2020 21:52)  HR: 79 (06 Apr 2020 12:42) (69 - 79)  BP: 148/107 (06 Apr 2020 12:42) (148/107 - 167/91)  BP(mean): --  RR: 20 (06 Apr 2020 12:42) (20 - 21)  SpO2: 100% (06 Apr 2020 12:42) (94% - 100%)    I&O's Summary        Physical Exam:   GENERAL: NAD, well-groomed, well-developed  HEENT: JILL/   Atraumatic, Normocephalic  ENMT: No tonsillar erythema, exudates, or enlargement; Moist mucous membranes, Good dentition, No lesions  NECK: Supple, No JVD, Normal thyroid  CHEST/LUNG: Clear to auscultaion, ; No rales, rhonchi, wheezing, or rubs  CVS: Regular rate and rhythm; No murmurs, rubs, or gallops  GI: : Soft, Nontender, Nondistended; Bowel sounds present  NERVOUS SYSTEM:  Alert & Oriented X3, Good concentration; Motor Strength 5/5 B/L upper and lower extremities; DTRs 2+ intact and symmetric  EXTREMITIES:  2+ Peripheral Pulses, No clubbing, cyanosis, or edema  LYMPH: No lymphadenopathy noted  SKIN: No rashes or lesions  ENDOCRINOLOGY: No Thyromegaly  PSYCH: Appropriate    Labs:                              14.0   10.57 )-----------( 363      ( 06 Apr 2020 07:53 )             42.6                         13.2   12.73 )-----------( 427      ( 05 Apr 2020 06:12 )             39.7                         13.0   13.57 )-----------( 480      ( 04 Apr 2020 06:30 )             40.4                         12.8   14.06 )-----------( 476      ( 03 Apr 2020 06:05 )             40.0     04-06    137  |  96<L>  |  36<H>  ----------------------------<  196<H>  3.7   |  29  |  1.27  04-05    133<L>  |  92<L>  |  42<H>  ----------------------------<  333<H>  4.0   |  28  |  1.27  04-04    131<L>  |  90<L>  |  39<H>  ----------------------------<  309<H>  4.0   |  26  |  1.25  04-03    134<L>  |  92<L>  |  33<H>  ----------------------------<  316<H>  4.1   |  29  |  1.23    Ca    8.7      06 Apr 2020 07:33  Ca    8.7      05 Apr 2020 06:13    TPro  6.7  /  Alb  2.9<L>  /  TBili  0.6  /  DBili  x   /  AST  27  /  ALT  40  /  AlkPhos  49  04-06  TPro  7.0  /  Alb  3.0<L>  /  TBili  0.5  /  DBili  x   /  AST  24  /  ALT  34  /  AlkPhos  52  04-05  TPro  7.5  /  Alb  3.1<L>  /  TBili  0.5  /  DBili  x   /  AST  34  /  ALT  39  /  AlkPhos  58  04-04  TPro  7.4  /  Alb  3.0<L>  /  TBili  0.5  /  DBili  x   /  AST  47<H>  /  ALT  41  /  AlkPhos  54  04-03    CAPILLARY BLOOD GLUCOSE  177 (06 Apr 2020 08:35)      POCT Blood Glucose.: 155 mg/dL (06 Apr 2020 12:29)  POCT Blood Glucose.: 177 mg/dL (06 Apr 2020 07:33)  POCT Blood Glucose.: 276 mg/dL (05 Apr 2020 20:57)  POCT Blood Glucose.: 218 mg/dL (05 Apr 2020 16:53)      LIVER FUNCTIONS - ( 06 Apr 2020 07:33 )  Alb: 2.9 g/dL / Pro: 6.7 g/dL / ALK PHOS: 49 u/L / ALT: 40 u/L / AST: 27 u/L / GGT: x             < from: Xray Chest 1 View- PORTABLE-Urgent (03.31.20 @ 02:54) >    INTERPRETATION:  CLINICAL INFORMATION: Fever and shortness of breath.    EXAM: 1 view of the chest.    COMPARISON: None.    FINDINGS:    Low lung volumes limiting evaluation. Patchy bilateral airspace opacities. No pneumothorax. The heart is enlarged.     IMPRESSION: Limited study but suspicion for opacities consistent with covid 19 infection.                CANDI NASH M.D., RADIOLOGY RESIDENT  This documenthas been electronically signed.  ANDREZ VERNON M.D., ATTENDING RADIOLOGIST  This document has been electronically signed. Mar 31 2020  7:09AM              < end of copied text >    D-Dimer Assay, Quantitative: 3911 ng/mL (04-05 @ 15:38)  D-Dimer Assay, Quantitative: 3143 ng/mL (04-04 @ 06:30)        RECENT CULTURES:        RESPIRATORY CULTURES:          Studies  Chest X-RAY  CT SCAN Chest   Venous Dopplers: LE:   CT Abdomen  Others

## 2020-04-06 NOTE — PROGRESS NOTE ADULT - ASSESSMENT
pt w/ symptoms c/w covid  feels better   on n/c /tolerating/   pulm f/u noted   steroids over   /+ anakinra /taper  s/p plaquenil  dvt proph  dm  fsg riss  hold oral meds/  c/w insulin regimen  htn  c/w meds as per cards  albuterol mdi  f/u inflammatory markers noted    left message for domestic partner

## 2020-04-06 NOTE — PROGRESS NOTE ADULT - SUBJECTIVE AND OBJECTIVE BOX
***************************************************************  CONTACT INFO  Juan José Cardoso M.D., PGY-2  Pager: [phone] ([facility]) / [phone] ([facility])    Mon-Fri: pager covered by day team 7am-7pm  After 7:00PM on weekdays and 12:00PM on weekends, page [phone]    ***************************************************************        Patient is a 59y old  Male who presents with a chief complaint of resp failure (04 Apr 2020 15:34)    SUBJECTIVE / OVERNIGHT EVENTS: Patient seen and examined at bedside. Vital signs stable overnight. Patient denies headache, dizziness, chest/abd pain, shortness of breath. ROS negative unless otherwise stated.     MEDICATIONS  (STANDING):  ALBUTerol    90 MICROgram(s) HFA Inhaler 1 Puff(s) Inhalation every 4 hours  amLODIPine   Tablet 10 milliGRAM(s) Oral daily  anakinra Injectable 100 milliGRAM(s) SubCutaneous every 12 hours  carvedilol 3.125 milliGRAM(s) Oral every 12 hours  dextrose 5%. 1000 milliLiter(s) (50 mL/Hr) IV Continuous <Continuous>  dextrose 50% Injectable 12.5 Gram(s) IV Push once  dextrose 50% Injectable 25 Gram(s) IV Push once  dextrose 50% Injectable 25 Gram(s) IV Push once  heparin  Injectable 5000 Unit(s) SubCutaneous every 8 hours  influenza   Vaccine 0.5 milliLiter(s) IntraMuscular once  insulin glargine Injectable (LANTUS) 15 Unit(s) SubCutaneous at bedtime  insulin lispro (HumaLOG) corrective regimen sliding scale   SubCutaneous three times a day before meals  losartan 100 milliGRAM(s) Oral daily    MEDICATIONS  (PRN):  acetaminophen   Tablet .. 650 milliGRAM(s) Oral every 6 hours PRN Temp greater or equal to 38.5C (101.3F)  dextrose 40% Gel 15 Gram(s) Oral once PRN Blood Glucose LESS THAN 70 milliGRAM(s)/deciliter  glucagon  Injectable 1 milliGRAM(s) IntraMuscular once PRN Glucose LESS THAN 70 milligrams/deciliter      Vital Signs Last 24 Hrs  T(C): 36.9 (06 Apr 2020 05:29), Max: 37.1 (05 Apr 2020 21:52)  T(F): 98.5 (06 Apr 2020 05:29), Max: 98.7 (05 Apr 2020 21:52)  HR: 79 (06 Apr 2020 05:29) (69 - 83)  BP: 159/108 (06 Apr 2020 05:29) (149/96 - 167/91)  BP(mean): --  RR: 20 (06 Apr 2020 05:29) (20 - 21)  SpO2: 97% (06 Apr 2020 05:29) (92% - 97%)  CAPILLARY BLOOD GLUCOSE      POCT Blood Glucose.: 276 mg/dL (05 Apr 2020 20:57)  POCT Blood Glucose.: 218 mg/dL (05 Apr 2020 16:53)  POCT Blood Glucose.: 301 mg/dL (05 Apr 2020 12:02)  POCT Blood Glucose.: 331 mg/dL (05 Apr 2020 07:40)    I&O's Summary      PHYSICAL EXAM  GENERAL: NAD, well-developed  EYES: conjunctiva and sclera clear  NECK: Supple, No JVD  ENT: MMM  CHEST/LUNG: Clear to auscultation bilaterally; No wheeze  HEART: Regular rate and rhythm; No murmurs  ABDOMEN: Soft, Nontender, Nondistended; Bowel sounds present  EXTREMITIES:  2+ Peripheral Pulses, No clubbing, cyanosis, or edema  NEURO: nonfocal, AOx3  PSYCH: calm   SKIN: No rashes or lesions    LABS:                        13.2   12.73 )-----------( 427      ( 05 Apr 2020 06:12 )             39.7     04-05    133<L>  |  92<L>  |  42<H>  ----------------------------<  333<H>  4.0   |  28  |  1.27    Ca    8.7      05 Apr 2020 06:13    TPro  7.0  /  Alb  3.0<L>  /  TBili  0.5  /  DBili  x   /  AST  24  /  ALT  34  /  AlkPhos  52  04-05                RADIOLOGY & ADDITIONAL TESTS:    Imaging Personally Reviewed:  Consultant(s) Notes Reviewed:    Care Discussed with Consultants/Other Providers: ***************************************************************  CONTACT INFO  Juan José Cardoso M.D., PGY-2  Pager: [phone] ([facility]) / [phone] ([facility])    Mon-Fri: pager covered by day team 7am-7pm  After 7:00PM on weekdays and 12:00PM on weekends, page [phone]    ***************************************************************        Patient is a 59y old  Male who presents with a chief complaint of resp failure (04 Apr 2020 15:34)    SUBJECTIVE / OVERNIGHT EVENTS: Patient seen and examined at bedside. Vital signs stable overnight. Patient denies headache, dizziness, chest/abd pain, shortness of breath. ROS negative unless otherwise stated. Currently on 10 L of NC.    MEDICATIONS  (STANDING):  ALBUTerol    90 MICROgram(s) HFA Inhaler 1 Puff(s) Inhalation every 4 hours  amLODIPine   Tablet 10 milliGRAM(s) Oral daily  anakinra Injectable 100 milliGRAM(s) SubCutaneous every 12 hours  carvedilol 3.125 milliGRAM(s) Oral every 12 hours  dextrose 5%. 1000 milliLiter(s) (50 mL/Hr) IV Continuous <Continuous>  dextrose 50% Injectable 12.5 Gram(s) IV Push once  dextrose 50% Injectable 25 Gram(s) IV Push once  dextrose 50% Injectable 25 Gram(s) IV Push once  heparin  Injectable 5000 Unit(s) SubCutaneous every 8 hours  influenza   Vaccine 0.5 milliLiter(s) IntraMuscular once  insulin glargine Injectable (LANTUS) 15 Unit(s) SubCutaneous at bedtime  insulin lispro (HumaLOG) corrective regimen sliding scale   SubCutaneous three times a day before meals  losartan 100 milliGRAM(s) Oral daily    MEDICATIONS  (PRN):  acetaminophen   Tablet .. 650 milliGRAM(s) Oral every 6 hours PRN Temp greater or equal to 38.5C (101.3F)  dextrose 40% Gel 15 Gram(s) Oral once PRN Blood Glucose LESS THAN 70 milliGRAM(s)/deciliter  glucagon  Injectable 1 milliGRAM(s) IntraMuscular once PRN Glucose LESS THAN 70 milligrams/deciliter      Vital Signs Last 24 Hrs  T(C): 36.9 (06 Apr 2020 05:29), Max: 37.1 (05 Apr 2020 21:52)  T(F): 98.5 (06 Apr 2020 05:29), Max: 98.7 (05 Apr 2020 21:52)  HR: 79 (06 Apr 2020 05:29) (69 - 83)  BP: 159/108 (06 Apr 2020 05:29) (149/96 - 167/91)  BP(mean): --  RR: 20 (06 Apr 2020 05:29) (20 - 21)  SpO2: 97% (06 Apr 2020 05:29) (92% - 97%)  CAPILLARY BLOOD GLUCOSE      POCT Blood Glucose.: 276 mg/dL (05 Apr 2020 20:57)  POCT Blood Glucose.: 218 mg/dL (05 Apr 2020 16:53)  POCT Blood Glucose.: 301 mg/dL (05 Apr 2020 12:02)  POCT Blood Glucose.: 331 mg/dL (05 Apr 2020 07:40)    I&O's Summary      PHYSICAL EXAM  GENERAL: NAD, well-developed  EYES: conjunctiva and sclera clear  NECK: Supple, No JVD  ENT: MMM  CHEST/LUNG: Clear to auscultation bilaterally; No wheeze  HEART: Regular rate and rhythm; No murmurs  ABDOMEN: Soft, Nontender, Nondistended; Bowel sounds present  EXTREMITIES:  2+ Peripheral Pulses, No clubbing, cyanosis, or edema  NEURO: nonfocal, AOx3  PSYCH: calm   SKIN: No rashes or lesions    LABS:                        13.2   12.73 )-----------( 427      ( 05 Apr 2020 06:12 )             39.7     04-05    133<L>  |  92<L>  |  42<H>  ----------------------------<  333<H>  4.0   |  28  |  1.27    Ca    8.7      05 Apr 2020 06:13    TPro  7.0  /  Alb  3.0<L>  /  TBili  0.5  /  DBili  x   /  AST  24  /  ALT  34  /  AlkPhos  52  04-05                RADIOLOGY & ADDITIONAL TESTS:    Imaging Personally Reviewed:  Consultant(s) Notes Reviewed:    Care Discussed with Consultants/Other Providers:

## 2020-04-06 NOTE — PROGRESS NOTE ADULT - SUBJECTIVE AND OBJECTIVE BOX
CARDIOLOGY FOLLOW UP - Dr. Piña    CC no cp ; improving sob- on NC       PHYSICAL EXAM:  T(C): 36.9 (04-06-20 @ 05:29), Max: 37.1 (04-05-20 @ 21:52)  HR: 79 (04-06-20 @ 05:29) (69 - 83)  BP: 159/108 (04-06-20 @ 05:29) (149/96 - 167/91)  RR: 20 (04-06-20 @ 05:29) (20 - 21)  SpO2: 97% (04-06-20 @ 05:29) (94% - 97%)  Wt(kg): --  I&O's Summary      Appearance: Normal	  Cardiovascular: Normal S1 S2,RRR, No JVD, No murmurs  Respiratory: Lungs clear to auscultation	  Gastrointestinal:  Soft, Non-tender, + BS	  Extremities: Normal range of motion, No clubbing, cyanosis or edema        MEDICATIONS  (STANDING):  ALBUTerol    90 MICROgram(s) HFA Inhaler 1 Puff(s) Inhalation every 4 hours  amLODIPine   Tablet 10 milliGRAM(s) Oral daily  anakinra Injectable 100 milliGRAM(s) SubCutaneous every 12 hours  carvedilol 3.125 milliGRAM(s) Oral every 12 hours  dextrose 5%. 1000 milliLiter(s) (50 mL/Hr) IV Continuous <Continuous>  dextrose 50% Injectable 12.5 Gram(s) IV Push once  dextrose 50% Injectable 25 Gram(s) IV Push once  dextrose 50% Injectable 25 Gram(s) IV Push once  heparin  Injectable 5000 Unit(s) SubCutaneous every 8 hours  influenza   Vaccine 0.5 milliLiter(s) IntraMuscular once  insulin glargine Injectable (LANTUS) 15 Unit(s) SubCutaneous at bedtime  insulin lispro (HumaLOG) corrective regimen sliding scale   SubCutaneous three times a day before meals  losartan 100 milliGRAM(s) Oral daily      TELEMETRY: off tele 	    ECG:  	  RADIOLOGY:   DIAGNOSTIC TESTING:  [ ] Echocardiogram:  [ ]  Catheterization:  [ ] Stress Test:    OTHER: 	    LABS:	 	    Troponin T, High Sensitivity: 31 ng/L [<6 - 14] (03-31 @ 03:40)                          14.0   10.57 )-----------( 363      ( 06 Apr 2020 07:53 )             42.6     04-06    137  |  96<L>  |  36<H>  ----------------------------<  196<H>  3.7   |  29  |  1.27    Ca    8.7      06 Apr 2020 07:33    TPro  6.7  /  Alb  2.9<L>  /  TBili  0.6  /  DBili  x   /  AST  27  /  ALT  40  /  AlkPhos  49  04-06 No complaints

## 2020-04-06 NOTE — PROGRESS NOTE ADULT - SUBJECTIVE AND OBJECTIVE BOX
CHIEF COMPLAINT:Patient is a 59y old  Male who presents with a chief complaint of covid pneumonia (06 Apr 2020 14:54)    	        PAST MEDICAL & SURGICAL HISTORY:  HLD (hyperlipidemia)  Diabetes mellitus  HTN (hypertension)          REVIEW OF SYSTEMS:  weak  sob/ cough  feels better  CARDIOVASCULAR: No chest pain, palpitations, passing out, dizziness, or leg swelling  GASTROINTESTINAL: No abdominal or epigastric pain. No nausea, vomiting, or hematemesis; No diarrhea or constipation. No melena or hematochezia.  GENITOURINARY: No dysuria, frequency, hematuria, or incontinence  NEUROLOGICAL: No headaches, memory loss,       Medications:  MEDICATIONS  (STANDING):  ALBUTerol    90 MICROgram(s) HFA Inhaler 1 Puff(s) Inhalation every 4 hours  amLODIPine   Tablet 10 milliGRAM(s) Oral daily  anakinra Injectable 100 milliGRAM(s) SubCutaneous every 12 hours  carvedilol 6.25 milliGRAM(s) Oral every 12 hours  dextrose 5%. 1000 milliLiter(s) (50 mL/Hr) IV Continuous <Continuous>  dextrose 50% Injectable 12.5 Gram(s) IV Push once  dextrose 50% Injectable 25 Gram(s) IV Push once  dextrose 50% Injectable 25 Gram(s) IV Push once  heparin  Injectable 5000 Unit(s) SubCutaneous every 8 hours  influenza   Vaccine 0.5 milliLiter(s) IntraMuscular once  insulin glargine Injectable (LANTUS) 15 Unit(s) SubCutaneous at bedtime  insulin lispro (HumaLOG) corrective regimen sliding scale   SubCutaneous three times a day before meals  losartan 100 milliGRAM(s) Oral daily    MEDICATIONS  (PRN):  acetaminophen   Tablet .. 650 milliGRAM(s) Oral every 4 hours PRN Temp greater or equal to 38.5C (101.3F)  dextrose 40% Gel 15 Gram(s) Oral once PRN Blood Glucose LESS THAN 70 milliGRAM(s)/deciliter  glucagon  Injectable 1 milliGRAM(s) IntraMuscular once PRN Glucose LESS THAN 70 milligrams/deciliter    	    PHYSICAL EXAM:  T(C): 36.7 (04-06-20 @ 12:42), Max: 37.1 (04-05-20 @ 21:52)  HR: 79 (04-06-20 @ 12:42) (69 - 79)  BP: 148/107 (04-06-20 @ 12:42) (148/107 - 167/91)  RR: 20 (04-06-20 @ 12:42) (20 - 21)  SpO2: 100% (04-06-20 @ 12:42) (94% - 100%)  Wt(kg): --  I&O's Summary      Appearance: Normal	  HEENT:   Normal oral mucosa, PERRL, EOMI	  Lymphatic: No lymphadenopathy  Cardiovascular: Normal S1 S2, No JVD, No murmurs, No edema  Respiratory: Lungs clear to auscultation	  Psychiatry: A & O x 3, Mood & affect appropriate  Gastrointestinal:  Soft, Non-tender, + BS	  Skin: No rashes, No ecchymoses, No cyanosis	  Neurologic: Non-focal  Extremities: Normal range of motion, No clubbing, cyanosis or edema  Vascular: Peripheral pulses palpable 2+ bilaterally    TELEMETRY: 	    ECG:  	  RADIOLOGY:  OTHER: 	  	  LABS:	 	    CARDIAC MARKERS:                                14.0   10.57 )-----------( 363      ( 06 Apr 2020 07:53 )             42.6     04-06    137  |  96<L>  |  36<H>  ----------------------------<  196<H>  3.7   |  29  |  1.27    Ca    8.7      06 Apr 2020 07:33    TPro  6.7  /  Alb  2.9<L>  /  TBili  0.6  /  DBili  x   /  AST  27  /  ALT  40  /  AlkPhos  49  04-06    proBNP:   Lipid Profile:   HgA1c:   TSH:

## 2020-04-06 NOTE — PROGRESS NOTE ADULT - ASSESSMENT
59 y /o Male  with PMH of HTN, HLD, and DM p/w fever and fatigue x 1 week found to be COVID + as of 3/31/20.     1. Acute hypoxic respiratory failure 2/2 to COVID infection  -COVID + as of 3/31/20.  -supplemental O2 as needed  -s/p abx, IV steroids   -anakinra per med   -pulm f/u , ID f/u, med f/u     2. HTN  -bp imrpoved but still elevated  -increase coreg to 6.25mg bid   -cont other meds       dvt ppx

## 2020-04-07 LAB
ALBUMIN SERPL ELPH-MCNC: 2.8 G/DL — LOW (ref 3.3–5)
ALBUMIN SERPL ELPH-MCNC: 2.8 G/DL — LOW (ref 3.3–5)
ALP SERPL-CCNC: 46 U/L — SIGNIFICANT CHANGE UP (ref 40–120)
ALP SERPL-CCNC: 46 U/L — SIGNIFICANT CHANGE UP (ref 40–120)
ALT FLD-CCNC: 33 U/L — SIGNIFICANT CHANGE UP (ref 4–41)
ALT FLD-CCNC: 33 U/L — SIGNIFICANT CHANGE UP (ref 4–41)
ANION GAP SERPL CALC-SCNC: 13 MMO/L — SIGNIFICANT CHANGE UP (ref 7–14)
ANION GAP SERPL CALC-SCNC: 13 MMO/L — SIGNIFICANT CHANGE UP (ref 7–14)
AST SERPL-CCNC: 32 U/L — SIGNIFICANT CHANGE UP (ref 4–40)
AST SERPL-CCNC: 32 U/L — SIGNIFICANT CHANGE UP (ref 4–40)
BASOPHILS # BLD AUTO: 0.01 K/UL — SIGNIFICANT CHANGE UP (ref 0–0.2)
BASOPHILS NFR BLD AUTO: 0.1 % — SIGNIFICANT CHANGE UP (ref 0–2)
BILIRUB SERPL-MCNC: 0.6 MG/DL — SIGNIFICANT CHANGE UP (ref 0.2–1.2)
BILIRUB SERPL-MCNC: 0.6 MG/DL — SIGNIFICANT CHANGE UP (ref 0.2–1.2)
BUN SERPL-MCNC: 33 MG/DL — HIGH (ref 7–23)
BUN SERPL-MCNC: 33 MG/DL — HIGH (ref 7–23)
CALCIUM SERPL-MCNC: 8.3 MG/DL — LOW (ref 8.4–10.5)
CALCIUM SERPL-MCNC: 8.3 MG/DL — LOW (ref 8.4–10.5)
CHLORIDE SERPL-SCNC: 94 MMOL/L — LOW (ref 98–107)
CHLORIDE SERPL-SCNC: 94 MMOL/L — LOW (ref 98–107)
CO2 SERPL-SCNC: 28 MMOL/L — SIGNIFICANT CHANGE UP (ref 22–31)
CO2 SERPL-SCNC: 28 MMOL/L — SIGNIFICANT CHANGE UP (ref 22–31)
CREAT SERPL-MCNC: 1.16 MG/DL — SIGNIFICANT CHANGE UP (ref 0.5–1.3)
CREAT SERPL-MCNC: 1.16 MG/DL — SIGNIFICANT CHANGE UP (ref 0.5–1.3)
EOSINOPHIL # BLD AUTO: 0.28 K/UL — SIGNIFICANT CHANGE UP (ref 0–0.5)
EOSINOPHIL NFR BLD AUTO: 3.1 % — SIGNIFICANT CHANGE UP (ref 0–6)
GLUCOSE BLDC GLUCOMTR-MCNC: 202 MG/DL — HIGH (ref 70–99)
GLUCOSE BLDC GLUCOMTR-MCNC: 243 MG/DL — HIGH (ref 70–99)
GLUCOSE BLDC GLUCOMTR-MCNC: 268 MG/DL — HIGH (ref 70–99)
GLUCOSE BLDC GLUCOMTR-MCNC: 292 MG/DL — HIGH (ref 70–99)
GLUCOSE BLDC GLUCOMTR-MCNC: 350 MG/DL — HIGH (ref 70–99)
GLUCOSE SERPL-MCNC: 200 MG/DL — HIGH (ref 70–99)
GLUCOSE SERPL-MCNC: 200 MG/DL — HIGH (ref 70–99)
HCT VFR BLD CALC: 40.6 % — SIGNIFICANT CHANGE UP (ref 39–50)
HGB BLD-MCNC: 13.2 G/DL — SIGNIFICANT CHANGE UP (ref 13–17)
IMM GRANULOCYTES NFR BLD AUTO: 1.9 % — HIGH (ref 0–1.5)
LYMPHOCYTES # BLD AUTO: 1.08 K/UL — SIGNIFICANT CHANGE UP (ref 1–3.3)
LYMPHOCYTES # BLD AUTO: 12 % — LOW (ref 13–44)
MAGNESIUM SERPL-MCNC: 2.3 MG/DL — SIGNIFICANT CHANGE UP (ref 1.6–2.6)
MCHC RBC-ENTMCNC: 26.3 PG — LOW (ref 27–34)
MCHC RBC-ENTMCNC: 32.5 % — SIGNIFICANT CHANGE UP (ref 32–36)
MCV RBC AUTO: 81 FL — SIGNIFICANT CHANGE UP (ref 80–100)
MONOCYTES # BLD AUTO: 0.76 K/UL — SIGNIFICANT CHANGE UP (ref 0–0.9)
MONOCYTES NFR BLD AUTO: 8.4 % — SIGNIFICANT CHANGE UP (ref 2–14)
NEUTROPHILS # BLD AUTO: 6.73 K/UL — SIGNIFICANT CHANGE UP (ref 1.8–7.4)
NEUTROPHILS NFR BLD AUTO: 74.5 % — SIGNIFICANT CHANGE UP (ref 43–77)
NRBC # FLD: 0 K/UL — SIGNIFICANT CHANGE UP (ref 0–0)
PHOSPHATE SERPL-MCNC: 3.4 MG/DL — SIGNIFICANT CHANGE UP (ref 2.5–4.5)
PLATELET # BLD AUTO: 319 K/UL — SIGNIFICANT CHANGE UP (ref 150–400)
PMV BLD: 10 FL — SIGNIFICANT CHANGE UP (ref 7–13)
POTASSIUM SERPL-MCNC: 3.9 MMOL/L — SIGNIFICANT CHANGE UP (ref 3.5–5.3)
POTASSIUM SERPL-MCNC: 3.9 MMOL/L — SIGNIFICANT CHANGE UP (ref 3.5–5.3)
POTASSIUM SERPL-SCNC: 3.9 MMOL/L — SIGNIFICANT CHANGE UP (ref 3.5–5.3)
POTASSIUM SERPL-SCNC: 3.9 MMOL/L — SIGNIFICANT CHANGE UP (ref 3.5–5.3)
PROT SERPL-MCNC: 6.7 G/DL — SIGNIFICANT CHANGE UP (ref 6–8.3)
PROT SERPL-MCNC: 6.7 G/DL — SIGNIFICANT CHANGE UP (ref 6–8.3)
RBC # BLD: 5.01 M/UL — SIGNIFICANT CHANGE UP (ref 4.2–5.8)
RBC # FLD: 14.1 % — SIGNIFICANT CHANGE UP (ref 10.3–14.5)
SODIUM SERPL-SCNC: 135 MMOL/L — SIGNIFICANT CHANGE UP (ref 135–145)
SODIUM SERPL-SCNC: 135 MMOL/L — SIGNIFICANT CHANGE UP (ref 135–145)
WBC # BLD: 9.03 K/UL — SIGNIFICANT CHANGE UP (ref 3.8–10.5)
WBC # FLD AUTO: 9.03 K/UL — SIGNIFICANT CHANGE UP (ref 3.8–10.5)

## 2020-04-07 RX ORDER — INSULIN LISPRO 100/ML
VIAL (ML) SUBCUTANEOUS AT BEDTIME
Refills: 0 | Status: DISCONTINUED | OUTPATIENT
Start: 2020-04-07 | End: 2020-04-15

## 2020-04-07 RX ORDER — ANAKINRA 100MG/0.67
100 SYRINGE (ML) SUBCUTANEOUS DAILY
Refills: 0 | Status: COMPLETED | OUTPATIENT
Start: 2020-04-07 | End: 2020-04-10

## 2020-04-07 RX ADMIN — CARVEDILOL PHOSPHATE 6.25 MILLIGRAM(S): 80 CAPSULE, EXTENDED RELEASE ORAL at 05:38

## 2020-04-07 RX ADMIN — LOSARTAN POTASSIUM 100 MILLIGRAM(S): 100 TABLET, FILM COATED ORAL at 05:38

## 2020-04-07 RX ADMIN — CARVEDILOL PHOSPHATE 6.25 MILLIGRAM(S): 80 CAPSULE, EXTENDED RELEASE ORAL at 17:31

## 2020-04-07 RX ADMIN — HEPARIN SODIUM 5000 UNIT(S): 5000 INJECTION INTRAVENOUS; SUBCUTANEOUS at 05:37

## 2020-04-07 RX ADMIN — HEPARIN SODIUM 5000 UNIT(S): 5000 INJECTION INTRAVENOUS; SUBCUTANEOUS at 22:57

## 2020-04-07 RX ADMIN — Medication 4: at 08:08

## 2020-04-07 RX ADMIN — HEPARIN SODIUM 5000 UNIT(S): 5000 INJECTION INTRAVENOUS; SUBCUTANEOUS at 12:29

## 2020-04-07 RX ADMIN — Medication 4: at 12:27

## 2020-04-07 RX ADMIN — Medication 6: at 17:28

## 2020-04-07 RX ADMIN — INSULIN GLARGINE 15 UNIT(S): 100 INJECTION, SOLUTION SUBCUTANEOUS at 22:56

## 2020-04-07 RX ADMIN — Medication 1: at 22:56

## 2020-04-07 RX ADMIN — AMLODIPINE BESYLATE 10 MILLIGRAM(S): 2.5 TABLET ORAL at 05:38

## 2020-04-07 RX ADMIN — Medication 100 MILLIGRAM(S): at 17:29

## 2020-04-07 RX ADMIN — Medication 100 MILLIGRAM(S): at 05:38

## 2020-04-07 NOTE — PROGRESS NOTE ADULT - SUBJECTIVE AND OBJECTIVE BOX
Referral signed and put in fax box. Please fax.  Thank you ***************************************************************  CONTACT INFO  Juan José Cardoso M.D., PGY-2  Pager: [phone] ([facility]) / [phone] ([facility])    Mon-Fri: pager covered by day team 7am-7pm  After 7:00PM on weekdays and 12:00PM on weekends, page [phone]    ***************************************************************        Patient is a 59y old  Male who presents with a chief complaint of covid pneumonia (06 Apr 2020 14:54)    SUBJECTIVE / OVERNIGHT EVENTS: Patient seen and examined at bedside. Vital signs stable overnight. Remains on 8L of NC with Sp02>94%.     MEDICATIONS  (STANDING):  ALBUTerol    90 MICROgram(s) HFA Inhaler 1 Puff(s) Inhalation every 4 hours  amLODIPine   Tablet 10 milliGRAM(s) Oral daily  carvedilol 6.25 milliGRAM(s) Oral every 12 hours  dextrose 5%. 1000 milliLiter(s) (50 mL/Hr) IV Continuous <Continuous>  dextrose 50% Injectable 12.5 Gram(s) IV Push once  dextrose 50% Injectable 25 Gram(s) IV Push once  dextrose 50% Injectable 25 Gram(s) IV Push once  heparin  Injectable 5000 Unit(s) SubCutaneous every 8 hours  influenza   Vaccine 0.5 milliLiter(s) IntraMuscular once  insulin glargine Injectable (LANTUS) 15 Unit(s) SubCutaneous at bedtime  insulin lispro (HumaLOG) corrective regimen sliding scale   SubCutaneous three times a day before meals  losartan 100 milliGRAM(s) Oral daily    MEDICATIONS  (PRN):  acetaminophen   Tablet .. 650 milliGRAM(s) Oral every 4 hours PRN Temp greater or equal to 38.5C (101.3F)  dextrose 40% Gel 15 Gram(s) Oral once PRN Blood Glucose LESS THAN 70 milliGRAM(s)/deciliter  glucagon  Injectable 1 milliGRAM(s) IntraMuscular once PRN Glucose LESS THAN 70 milligrams/deciliter      Vital Signs Last 24 Hrs  T(C): 36.8 (06 Apr 2020 22:53), Max: 36.8 (06 Apr 2020 22:53)  T(F): 98.3 (06 Apr 2020 22:53), Max: 98.3 (06 Apr 2020 22:53)  HR: 71 (07 Apr 2020 05:28) (71 - 86)  BP: 162/73 (07 Apr 2020 05:28) (148/107 - 162/73)  RR: 19 (07 Apr 2020 05:28) (19 - 20)  SpO2: 94% (07 Apr 2020 05:28) (93% - 100%)  CAPILLARY BLOOD GLUCOSE  177 (06 Apr 2020 08:35)      POCT Blood Glucose.: 202 mg/dL (07 Apr 2020 07:36)  POCT Blood Glucose.: 212 mg/dL (06 Apr 2020 22:25)  POCT Blood Glucose.: 250 mg/dL (06 Apr 2020 17:04)  POCT Blood Glucose.: 155 mg/dL (06 Apr 2020 12:29)    I&O's Summary      PHYSICAL EXAM  GENERAL: NAD, well-developed, on supplemental oxygen  EYES: conjunctiva and sclera clear  NECK: Supple, No JVD  ENT: MMM  CHEST/LUNG: Clear to auscultation bilaterally; No wheeze  HEART: Regular rate and rhythm; No murmurs  ABDOMEN: Soft, Nontender, Nondistended; Bowel sounds present  EXTREMITIES:  2+ Peripheral Pulses, No clubbing, cyanosis, or edema  NEURO: nonfocal, AOx3  PSYCH: calm   SKIN: No rashes or lesions    LABS:                        13.2   9.03  )-----------( 319      ( 07 Apr 2020 05:21 )             40.6     04-07    135  |  94<L>  |  33<H>  ----------------------------<  200<H>  3.9   |  28  |  1.16    Ca    8.3<L>      07 Apr 2020 05:21  Phos  3.4     04-07  Mg     2.3     04-07    TPro  6.7  /  Alb  2.8<L>  /  TBili  0.6  /  DBili  x   /  AST  32  /  ALT  33  /  AlkPhos  46  04-07                RADIOLOGY & ADDITIONAL TESTS:    Imaging Personally Reviewed:  Consultant(s) Notes Reviewed:    Care Discussed with Consultants/Other Providers:

## 2020-04-07 NOTE — PROGRESS NOTE ADULT - SUBJECTIVE AND OBJECTIVE BOX
CARDIOLOGY FOLLOW UP - Dr. Piña    CC no acute events  on 6l nasal canula - o2 sat 94%  VSS       PHYSICAL EXAM:  T(C): 36.8 (04-06-20 @ 22:53), Max: 36.8 (04-06-20 @ 22:53)  HR: 71 (04-07-20 @ 05:28) (71 - 86)  BP: 162/73 (04-07-20 @ 05:28) (148/107 - 162/73)  RR: 19 (04-07-20 @ 05:28) (19 - 20)  SpO2: 94% (04-07-20 @ 05:28) (93% - 100%)  Wt(kg): --  I&O's Summary      Appearance: Normal	  Cardiovascular: Normal S1 S2,RRR, No JVD, No murmurs  Respiratory: Lungs clear to auscultation	  Gastrointestinal:  Soft, Non-tender, + BS	  Extremities: Normal range of motion, No clubbing, cyanosis or edema        MEDICATIONS  (STANDING):  ALBUTerol    90 MICROgram(s) HFA Inhaler 1 Puff(s) Inhalation every 4 hours  amLODIPine   Tablet 10 milliGRAM(s) Oral daily  anakinra Injectable 100 milliGRAM(s) SubCutaneous daily  carvedilol 6.25 milliGRAM(s) Oral every 12 hours  dextrose 5%. 1000 milliLiter(s) (50 mL/Hr) IV Continuous <Continuous>  dextrose 50% Injectable 12.5 Gram(s) IV Push once  dextrose 50% Injectable 25 Gram(s) IV Push once  dextrose 50% Injectable 25 Gram(s) IV Push once  heparin  Injectable 5000 Unit(s) SubCutaneous every 8 hours  influenza   Vaccine 0.5 milliLiter(s) IntraMuscular once  insulin glargine Injectable (LANTUS) 15 Unit(s) SubCutaneous at bedtime  insulin lispro (HumaLOG) corrective regimen sliding scale   SubCutaneous three times a day before meals  losartan 100 milliGRAM(s) Oral daily      TELEMETRY: OFF TELE   ECG:  	  RADIOLOGY:   DIAGNOSTIC TESTING:  [ ] Echocardiogram:  [ ]  Catheterization:  [ ] Stress Test:    OTHER: 	    LABS:	 	                                13.2   9.03  )-----------( 319      ( 07 Apr 2020 05:21 )             40.6     04-07    135  |  94<L>  |  33<H>  ----------------------------<  200<H>  3.9   |  28  |  1.16    Ca    8.3<L>      07 Apr 2020 05:21  Phos  3.4     04-07  Mg     2.3     04-07    TPro  6.7  /  Alb  2.8<L>  /  TBili  0.6  /  DBili  x   /  AST  32  /  ALT  33  /  AlkPhos  46  04-07

## 2020-04-07 NOTE — PROGRESS NOTE ADULT - SUBJECTIVE AND OBJECTIVE BOX
Patient is a 59y old  Male who presents with a chief complaint of covid pneumonia (06 Apr 2020 14:54)      Any change in ROS: oimrpoved: 8 l ofoxygen: o2 sao2 is better:       MEDICATIONS  (STANDING):  ALBUTerol    90 MICROgram(s) HFA Inhaler 1 Puff(s) Inhalation every 4 hours  amLODIPine   Tablet 10 milliGRAM(s) Oral daily  anakinra Injectable 100 milliGRAM(s) SubCutaneous daily  carvedilol 6.25 milliGRAM(s) Oral every 12 hours  dextrose 5%. 1000 milliLiter(s) (50 mL/Hr) IV Continuous <Continuous>  dextrose 50% Injectable 12.5 Gram(s) IV Push once  dextrose 50% Injectable 25 Gram(s) IV Push once  dextrose 50% Injectable 25 Gram(s) IV Push once  heparin  Injectable 5000 Unit(s) SubCutaneous every 8 hours  influenza   Vaccine 0.5 milliLiter(s) IntraMuscular once  insulin glargine Injectable (LANTUS) 15 Unit(s) SubCutaneous at bedtime  insulin lispro (HumaLOG) corrective regimen sliding scale   SubCutaneous three times a day before meals  losartan 100 milliGRAM(s) Oral daily    MEDICATIONS  (PRN):  acetaminophen   Tablet .. 650 milliGRAM(s) Oral every 4 hours PRN Temp greater or equal to 38.5C (101.3F)  dextrose 40% Gel 15 Gram(s) Oral once PRN Blood Glucose LESS THAN 70 milliGRAM(s)/deciliter  glucagon  Injectable 1 milliGRAM(s) IntraMuscular once PRN Glucose LESS THAN 70 milligrams/deciliter    Vital Signs Last 24 Hrs  T(C): 37.3 (07 Apr 2020 13:00), Max: 37.3 (07 Apr 2020 13:00)  T(F): 99.2 (07 Apr 2020 13:00), Max: 99.2 (07 Apr 2020 13:00)  HR: 78 (07 Apr 2020 13:00) (71 - 86)  BP: 137/95 (07 Apr 2020 13:00) (137/95 - 162/73)  BP(mean): --  RR: 20 (07 Apr 2020 13:00) (19 - 20)  SpO2: 100% (07 Apr 2020 13:00) (93% - 100%)    I&O's Summary        Physical Exam:   GENERAL: NAD, well-groomed, well-developed  HEENT: JILL/   Atraumatic, Normocephalic  ENMT: No tonsillar erythema, exudates, or enlargement; Moist mucous membranes, Good dentition, No lesions  NECK: Supple, No JVD, Normal thyroid  CHEST/LUNG: Clear to auscultaion, ; No rales, rhonchi, wheezing, or rubs  CVS: Regular rate and rhythm; No murmurs, rubs, or gallops  GI: : Soft, Nontender, Nondistended; Bowel sounds present  NERVOUS SYSTEM:  Alert & Oriented X3, Good concentration; Motor Strength 5/5 B/L upper and lower extremities; DTRs 2+ intact and symmetric  EXTREMITIES:  2+ Peripheral Pulses, No clubbing, cyanosis, or edema  LYMPH: No lymphadenopathy noted  SKIN: No rashes or lesions  ENDOCRINOLOGY: No Thyromegaly  PSYCH: Appropriate    Labs:                              13.2   9.03  )-----------( 319      ( 07 Apr 2020 05:21 )             40.6                         14.0   10.57 )-----------( 363      ( 06 Apr 2020 07:53 )             42.6                         13.2   12.73 )-----------( 427      ( 05 Apr 2020 06:12 )             39.7                         13.0   13.57 )-----------( 480      ( 04 Apr 2020 06:30 )             40.4     04-07    135  |  94<L>  |  33<H>  ----------------------------<  200<H>  3.9   |  28  |  1.16  04-06    137  |  96<L>  |  36<H>  ----------------------------<  196<H>  3.7   |  29  |  1.27  04-05    133<L>  |  92<L>  |  42<H>  ----------------------------<  333<H>  4.0   |  28  |  1.27  04-04    131<L>  |  90<L>  |  39<H>  ----------------------------<  309<H>  4.0   |  26  |  1.25    Ca    8.3<L>      07 Apr 2020 05:21  Ca    8.7      06 Apr 2020 07:33  Phos  3.4     04-07  Mg     2.3     04-07    TPro  6.7  /  Alb  2.8<L>  /  TBili  0.6  /  DBili  x   /  AST  32  /  ALT  33  /  AlkPhos  46  04-07  TPro  6.7  /  Alb  2.9<L>  /  TBili  0.6  /  DBili  x   /  AST  27  /  ALT  40  /  AlkPhos  49  04-06  TPro  7.0  /  Alb  3.0<L>  /  TBili  0.5  /  DBili  x   /  AST  24  /  ALT  34  /  AlkPhos  52  04-05  TPro  7.5  /  Alb  3.1<L>  /  TBili  0.5  /  DBili  x   /  AST  34  /  ALT  39  /  AlkPhos  58  04-04    CAPILLARY BLOOD GLUCOSE      POCT Blood Glucose.: 243 mg/dL (07 Apr 2020 12:04)  POCT Blood Glucose.: 202 mg/dL (07 Apr 2020 07:36)  POCT Blood Glucose.: 212 mg/dL (06 Apr 2020 22:25)  POCT Blood Glucose.: 250 mg/dL (06 Apr 2020 17:04)      LIVER FUNCTIONS - ( 07 Apr 2020 05:21 )  Alb: 2.8 g/dL / Pro: 6.7 g/dL / ALK PHOS: 46 u/L / ALT: 33 u/L / AST: 32 u/L / GGT: x               D-Dimer Assay, Quantitative: 3911 ng/mL (04-05 @ 15:38)  D-Dimer Assay, Quantitative: 3143 ng/mL (04-04 @ 06:30)        RECENT CULTURES:        RESPIRATORY CULTURES:          Studies  Chest X-RAY  CT SCAN Chest   Venous Dopplers: LE:   CT Abdomen  Others

## 2020-04-07 NOTE — PROGRESS NOTE ADULT - ASSESSMENT
59 y /o Male  with PMH of HTN, HLD, and DM p/w fever and fatigue x 1 week found to be COVID + as of 3/31/20.     1. Acute hypoxic respiratory failure 2/2 to COVID infection  -COVID + as of 3/31/20.  -supplemental O2 as needed  -s/p abx, IV steroids   -anakinra per med   -pulm f/u , ID f/u, med f/u     2. HTN  -bp improved but still elevated  -c/w coreg to 6.25mg bid   -cont other meds       dvt ppx

## 2020-04-07 NOTE — PROGRESS NOTE ADULT - ASSESSMENT
pt w/ symptoms c/w covid  feels better   on n/c /tolerating/   pulm f/u noted   steroids over   /+ anakinra /taper  s/p plaquenil  dvt proph  dm  fsg riss  hold oral meds/  c/w insulin regimen  htn  c/w meds as per cards  albuterol mdi  f/u inflammatory markers noted    spoke w/ domestic partner

## 2020-04-07 NOTE — PROGRESS NOTE ADULT - PROVIDER SPECIALTY LIST ADULT
----- Message from Aracely Thomson sent at 9/11/2018  2:14 PM CDT -----  Contact: [phone]  Is having surgery on Monday and she wants to cancel.     Spoke with the patient and she is having a bad flareup of osteoarthritis and feels like she cannot tolerate surgery at this point until she gets her pain under better control.  She states that his sooner if she gets her arthritis under better control she will call and reschedule surgery.  In the meantime I think another course of antibiotics is warranted and she felt like the Biaxin had worked better than any of the others so another round of Biaxin for 3 weeks will be sent into her pharmacy.  She'll call when she is ready to reschedule.     Cardiology

## 2020-04-07 NOTE — PROGRESS NOTE ADULT - ASSESSMENT
pt w/ symptoms c/w covid    COVID PNEUMONIA    on 13 L of nasal cannula  D5 of anakinra: bid dosing:  finished steroids as well as Plaquenil  D D WENDY: CRP AND FERRITIN ALL HIGH:  FOLLOW UP IN AM   DVT PROPAHYXLIS     4/6:  much better : 11 100% sao2  d6 of anakinra bid dosing: crp has come down as well as ferritin: d dimer is still high   dvt prophylaxis  cont to titrate down fio2    4/7: doing much better:  on 8 l : o 2 sao2 100%:   now anakinra on one a day dosing: stop after three days:  dvt prophylaxis     DM  HTN

## 2020-04-07 NOTE — PROGRESS NOTE ADULT - SUBJECTIVE AND OBJECTIVE BOX
CHIEF COMPLAINT:Patient is a 59y old  Male who presents with a chief complaint of covid pneumonia (06 Apr 2020 14:54)    	        PAST MEDICAL & SURGICAL HISTORY:  HLD (hyperlipidemia)  Diabetes mellitus  HTN (hypertension)          REVIEW OF SYSTEMS:  feels better  less sob/ cough   CARDIOVASCULAR: No chest pain, palpitations, passing out, dizziness, or leg swelling  GASTROINTESTINAL: No abdominal or epigastric pain. No nausea, vomiting, or hematemesis; No diarrhea or constipation. No melena or hematochezia.  GENITOURINARY: No dysuria, frequency, hematuria, or incontinence  NEUROLOGICAL: No headaches, memory loss    Medications:  MEDICATIONS  (STANDING):  ALBUTerol    90 MICROgram(s) HFA Inhaler 1 Puff(s) Inhalation every 4 hours  amLODIPine   Tablet 10 milliGRAM(s) Oral daily  anakinra Injectable 100 milliGRAM(s) SubCutaneous daily  carvedilol 6.25 milliGRAM(s) Oral every 12 hours  dextrose 5%. 1000 milliLiter(s) (50 mL/Hr) IV Continuous <Continuous>  dextrose 50% Injectable 12.5 Gram(s) IV Push once  dextrose 50% Injectable 25 Gram(s) IV Push once  dextrose 50% Injectable 25 Gram(s) IV Push once  heparin  Injectable 5000 Unit(s) SubCutaneous every 8 hours  influenza   Vaccine 0.5 milliLiter(s) IntraMuscular once  insulin glargine Injectable (LANTUS) 15 Unit(s) SubCutaneous at bedtime  insulin lispro (HumaLOG) corrective regimen sliding scale   SubCutaneous three times a day before meals  losartan 100 milliGRAM(s) Oral daily    MEDICATIONS  (PRN):  acetaminophen   Tablet .. 650 milliGRAM(s) Oral every 4 hours PRN Temp greater or equal to 38.5C (101.3F)  dextrose 40% Gel 15 Gram(s) Oral once PRN Blood Glucose LESS THAN 70 milliGRAM(s)/deciliter  glucagon  Injectable 1 milliGRAM(s) IntraMuscular once PRN Glucose LESS THAN 70 milligrams/deciliter    	    PHYSICAL EXAM:  T(C): 37.3 (04-07-20 @ 13:00), Max: 37.3 (04-07-20 @ 13:00)  HR: 78 (04-07-20 @ 13:00) (71 - 86)  BP: 137/95 (04-07-20 @ 13:00) (137/95 - 162/73)  RR: 20 (04-07-20 @ 13:00) (19 - 20)  SpO2: 100% (04-07-20 @ 13:00) (93% - 100%)  Wt(kg): --  I&O's Summary      Appearance: Normal	  HEENT:   Normal oral mucosa, PERRL, EOMI	  Lymphatic: No lymphadenopathy  Cardiovascular: Normal S1 S2, No JVD, No murmurs, No edema  Respiratory: Lungs clear to auscultation	  Psychiatry: A & O x 3, Mood & affect appropriate  Gastrointestinal:  Soft, Non-tender, + BS	  Skin: No rashes, No ecchymoses, No cyanosis	  Neurologic: Non-focal  Extremities: Normal range of motion, No clubbing, cyanosis or edema  Vascular: Peripheral pulses palpable 2+ bilaterally    TELEMETRY: 	    ECG:  	  RADIOLOGY:  OTHER: 	  	  LABS:	 	    CARDIAC MARKERS:                                13.2   9.03  )-----------( 319      ( 07 Apr 2020 05:21 )             40.6     04-07    135  |  94<L>  |  33<H>  ----------------------------<  200<H>  3.9   |  28  |  1.16    Ca    8.3<L>      07 Apr 2020 05:21  Phos  3.4     04-07  Mg     2.3     04-07    TPro  6.7  /  Alb  2.8<L>  /  TBili  0.6  /  DBili  x   /  AST  32  /  ALT  33  /  AlkPhos  46  04-07    proBNP:   Lipid Profile:   HgA1c:   TSH:

## 2020-04-08 LAB
ALBUMIN SERPL ELPH-MCNC: 2.7 G/DL — LOW (ref 3.3–5)
ALP SERPL-CCNC: 42 U/L — SIGNIFICANT CHANGE UP (ref 40–120)
ALT FLD-CCNC: 32 U/L — SIGNIFICANT CHANGE UP (ref 4–41)
ANION GAP SERPL CALC-SCNC: 9 MMO/L — SIGNIFICANT CHANGE UP (ref 7–14)
AST SERPL-CCNC: 23 U/L — SIGNIFICANT CHANGE UP (ref 4–40)
BASOPHILS # BLD AUTO: 0.02 K/UL — SIGNIFICANT CHANGE UP (ref 0–0.2)
BASOPHILS NFR BLD AUTO: 0.2 % — SIGNIFICANT CHANGE UP (ref 0–2)
BILIRUB SERPL-MCNC: 0.5 MG/DL — SIGNIFICANT CHANGE UP (ref 0.2–1.2)
BUN SERPL-MCNC: 38 MG/DL — HIGH (ref 7–23)
CALCIUM SERPL-MCNC: 8.7 MG/DL — SIGNIFICANT CHANGE UP (ref 8.4–10.5)
CHLORIDE SERPL-SCNC: 95 MMOL/L — LOW (ref 98–107)
CO2 SERPL-SCNC: 30 MMOL/L — SIGNIFICANT CHANGE UP (ref 22–31)
CREAT SERPL-MCNC: 1.31 MG/DL — HIGH (ref 0.5–1.3)
EOSINOPHIL # BLD AUTO: 0.23 K/UL — SIGNIFICANT CHANGE UP (ref 0–0.5)
EOSINOPHIL NFR BLD AUTO: 2.8 % — SIGNIFICANT CHANGE UP (ref 0–6)
GLUCOSE BLDC GLUCOMTR-MCNC: 168 MG/DL — HIGH (ref 70–99)
GLUCOSE BLDC GLUCOMTR-MCNC: 191 MG/DL — HIGH (ref 70–99)
GLUCOSE BLDC GLUCOMTR-MCNC: 217 MG/DL — HIGH (ref 70–99)
GLUCOSE BLDC GLUCOMTR-MCNC: 231 MG/DL — HIGH (ref 70–99)
GLUCOSE SERPL-MCNC: 230 MG/DL — HIGH (ref 70–99)
HCT VFR BLD CALC: 39 % — SIGNIFICANT CHANGE UP (ref 39–50)
HGB BLD-MCNC: 12.7 G/DL — LOW (ref 13–17)
IMM GRANULOCYTES NFR BLD AUTO: 1.9 % — HIGH (ref 0–1.5)
LYMPHOCYTES # BLD AUTO: 1.02 K/UL — SIGNIFICANT CHANGE UP (ref 1–3.3)
LYMPHOCYTES # BLD AUTO: 12.3 % — LOW (ref 13–44)
MCHC RBC-ENTMCNC: 27 PG — SIGNIFICANT CHANGE UP (ref 27–34)
MCHC RBC-ENTMCNC: 32.6 % — SIGNIFICANT CHANGE UP (ref 32–36)
MCV RBC AUTO: 82.8 FL — SIGNIFICANT CHANGE UP (ref 80–100)
MONOCYTES # BLD AUTO: 0.76 K/UL — SIGNIFICANT CHANGE UP (ref 0–0.9)
MONOCYTES NFR BLD AUTO: 9.2 % — SIGNIFICANT CHANGE UP (ref 2–14)
NEUTROPHILS # BLD AUTO: 6.09 K/UL — SIGNIFICANT CHANGE UP (ref 1.8–7.4)
NEUTROPHILS NFR BLD AUTO: 73.6 % — SIGNIFICANT CHANGE UP (ref 43–77)
NRBC # FLD: 0 K/UL — SIGNIFICANT CHANGE UP (ref 0–0)
PLATELET # BLD AUTO: 303 K/UL — SIGNIFICANT CHANGE UP (ref 150–400)
PMV BLD: 10.7 FL — SIGNIFICANT CHANGE UP (ref 7–13)
POTASSIUM SERPL-MCNC: 3.5 MMOL/L — SIGNIFICANT CHANGE UP (ref 3.5–5.3)
POTASSIUM SERPL-SCNC: 3.5 MMOL/L — SIGNIFICANT CHANGE UP (ref 3.5–5.3)
PROCALCITONIN SERPL-MCNC: SIGNIFICANT CHANGE UP NG/ML (ref 0.02–0.1)
PROT SERPL-MCNC: 6.3 G/DL — SIGNIFICANT CHANGE UP (ref 6–8.3)
RBC # BLD: 4.71 M/UL — SIGNIFICANT CHANGE UP (ref 4.2–5.8)
RBC # FLD: 14.3 % — SIGNIFICANT CHANGE UP (ref 10.3–14.5)
SODIUM SERPL-SCNC: 134 MMOL/L — LOW (ref 135–145)
WBC # BLD: 8.28 K/UL — SIGNIFICANT CHANGE UP (ref 3.8–10.5)
WBC # FLD AUTO: 8.28 K/UL — SIGNIFICANT CHANGE UP (ref 3.8–10.5)

## 2020-04-08 RX ORDER — INSULIN LISPRO 100/ML
3 VIAL (ML) SUBCUTANEOUS
Refills: 0 | Status: DISCONTINUED | OUTPATIENT
Start: 2020-04-08 | End: 2020-04-15

## 2020-04-08 RX ORDER — INSULIN GLARGINE 100 [IU]/ML
18 INJECTION, SOLUTION SUBCUTANEOUS AT BEDTIME
Refills: 0 | Status: DISCONTINUED | OUTPATIENT
Start: 2020-04-08 | End: 2020-04-15

## 2020-04-08 RX ORDER — CARVEDILOL PHOSPHATE 80 MG/1
12.5 CAPSULE, EXTENDED RELEASE ORAL EVERY 12 HOURS
Refills: 0 | Status: DISCONTINUED | OUTPATIENT
Start: 2020-04-08 | End: 2020-04-13

## 2020-04-08 RX ADMIN — HEPARIN SODIUM 5000 UNIT(S): 5000 INJECTION INTRAVENOUS; SUBCUTANEOUS at 21:59

## 2020-04-08 RX ADMIN — Medication 4: at 08:24

## 2020-04-08 RX ADMIN — Medication 3 UNIT(S): at 17:21

## 2020-04-08 RX ADMIN — AMLODIPINE BESYLATE 10 MILLIGRAM(S): 2.5 TABLET ORAL at 04:52

## 2020-04-08 RX ADMIN — CARVEDILOL PHOSPHATE 6.25 MILLIGRAM(S): 80 CAPSULE, EXTENDED RELEASE ORAL at 04:52

## 2020-04-08 RX ADMIN — Medication 100 MILLIGRAM(S): at 13:03

## 2020-04-08 RX ADMIN — HEPARIN SODIUM 5000 UNIT(S): 5000 INJECTION INTRAVENOUS; SUBCUTANEOUS at 13:03

## 2020-04-08 RX ADMIN — Medication 2: at 13:03

## 2020-04-08 RX ADMIN — Medication 4: at 17:21

## 2020-04-08 RX ADMIN — HEPARIN SODIUM 5000 UNIT(S): 5000 INJECTION INTRAVENOUS; SUBCUTANEOUS at 04:52

## 2020-04-08 RX ADMIN — INSULIN GLARGINE 18 UNIT(S): 100 INJECTION, SOLUTION SUBCUTANEOUS at 21:59

## 2020-04-08 RX ADMIN — CARVEDILOL PHOSPHATE 12.5 MILLIGRAM(S): 80 CAPSULE, EXTENDED RELEASE ORAL at 17:22

## 2020-04-08 RX ADMIN — LOSARTAN POTASSIUM 100 MILLIGRAM(S): 100 TABLET, FILM COATED ORAL at 04:52

## 2020-04-08 NOTE — PROGRESS NOTE ADULT - ASSESSMENT
pt w/ symptoms c/w covid    COVID PNEUMONIA    on 13 L of nasal cannula  D5 of anakinra: bid dosing:  finished steroids as well as Plaquenil  D D WENDY: CRP AND FERRITIN ALL HIGH:  FOLLOW UP IN AM   DVT PROPAHYXLIS     4/6:  much better : 11 100% sao2  d6 of anakinra bid dosing: crp has come down as well as ferritin: d dimer is still high   dvt prophylaxis  cont to titrate down fio2    4/7: doing much better:  on 8 l : o 2 sao2 100%:   now anakinra on one a day dosing: stop after three days:  dvt prophylaxis     4/8:    on 6 L of oxygen now: doing a little better:   on anakinra q daily dosages:   todays is in [place]: would defer to primary team for management  dvt propahyxlisx:  his oxygenation seems t o be better but now heis in jace:   FOLLOW UP FERRITIN, D DIMER: AND CRP IN AM         DM  HTN

## 2020-04-08 NOTE — DIETITIAN INITIAL EVALUATION ADULT. - PERTINENT LABORATORY DATA
(4/8) Hbg 12.7 L, Na 134 L, Cl 95 L, BUN 38 H, Creat 1.31 H, Glu 230 H, Albumin 2.7 L;         (4/1) HbA1c 8.3% H

## 2020-04-08 NOTE — PROGRESS NOTE ADULT - SUBJECTIVE AND OBJECTIVE BOX
Patient is a 59y old  Male who presents with a chief complaint of resp failure (07 Apr 2020 15:04)      Any change in ROS:     MEDICATIONS  (STANDING):  ALBUTerol    90 MICROgram(s) HFA Inhaler 1 Puff(s) Inhalation every 4 hours  amLODIPine   Tablet 10 milliGRAM(s) Oral daily  anakinra Injectable 100 milliGRAM(s) SubCutaneous daily  carvedilol 12.5 milliGRAM(s) Oral every 12 hours  dextrose 5%. 1000 milliLiter(s) (50 mL/Hr) IV Continuous <Continuous>  dextrose 50% Injectable 12.5 Gram(s) IV Push once  dextrose 50% Injectable 25 Gram(s) IV Push once  dextrose 50% Injectable 25 Gram(s) IV Push once  heparin  Injectable 5000 Unit(s) SubCutaneous every 8 hours  influenza   Vaccine 0.5 milliLiter(s) IntraMuscular once  insulin glargine Injectable (LANTUS) 18 Unit(s) SubCutaneous at bedtime  insulin lispro (HumaLOG) corrective regimen sliding scale   SubCutaneous at bedtime  insulin lispro (HumaLOG) corrective regimen sliding scale   SubCutaneous three times a day before meals  insulin lispro Injectable (HumaLOG) 3 Unit(s) SubCutaneous three times a day before meals    MEDICATIONS  (PRN):  acetaminophen   Tablet .. 650 milliGRAM(s) Oral every 4 hours PRN Temp greater or equal to 38.5C (101.3F)  dextrose 40% Gel 15 Gram(s) Oral once PRN Blood Glucose LESS THAN 70 milliGRAM(s)/deciliter  glucagon  Injectable 1 milliGRAM(s) IntraMuscular once PRN Glucose LESS THAN 70 milligrams/deciliter    Vital Signs Last 24 Hrs  T(C): 36.8 (07 Apr 2020 20:28), Max: 36.8 (07 Apr 2020 20:28)  T(F): 98.2 (07 Apr 2020 20:28), Max: 98.2 (07 Apr 2020 20:28)  HR: 76 (08 Apr 2020 04:49) (76 - 82)  BP: 156/97 (08 Apr 2020 04:49) (114/77 - 156/97)  BP(mean): --  RR: 18 (08 Apr 2020 04:49) (18 - 19)  SpO2: 93% (08 Apr 2020 04:49) (93% - 95%)    I&O's Summary        Physical Exam:   GENERAL: NAD, well-groomed, well-developed  HEENT: JILL/   Atraumatic, Normocephalic  ENMT: No tonsillar erythema, exudates, or enlargement; Moist mucous membranes, Good dentition, No lesions  NECK: Supple, No JVD, Normal thyroid  CHEST/LUNG: Clear to auscultaion, ; No rales, rhonchi, wheezing, or rubs  CVS: Regular rate and rhythm; No murmurs, rubs, or gallops  GI: : Soft, Nontender, Nondistended; Bowel sounds present  NERVOUS SYSTEM:  Alert & Oriented X3, Good concentration; Motor Strength 5/5 B/L upper and lower extremities; DTRs 2+ intact and symmetric  EXTREMITIES:  2+ Peripheral Pulses, No clubbing, cyanosis, or edema  LYMPH: No lymphadenopathy noted  SKIN: No rashes or lesions  ENDOCRINOLOGY: No Thyromegaly  PSYCH: Appropriate    Labs:                              12.7   8.28  )-----------( 303      ( 08 Apr 2020 05:31 )             39.0                         13.2   9.03  )-----------( 319      ( 07 Apr 2020 05:21 )             40.6                         14.0   10.57 )-----------( 363      ( 06 Apr 2020 07:53 )             42.6                         13.2   12.73 )-----------( 427      ( 05 Apr 2020 06:12 )             39.7     04-08    134<L>  |  95<L>  |  38<H>  ----------------------------<  230<H>  3.5   |  30  |  1.31<H>  04-07    135  |  94<L>  |  33<H>  ----------------------------<  200<H>  3.9   |  28  |  1.16  04-06    137  |  96<L>  |  36<H>  ----------------------------<  196<H>  3.7   |  29  |  1.27  04-05    133<L>  |  92<L>  |  42<H>  ----------------------------<  333<H>  4.0   |  28  |  1.27    Ca    8.7      08 Apr 2020 05:31  Ca    8.3<L>      07 Apr 2020 05:21  Phos  3.4     04-07  Mg     2.3     04-07    TPro  6.3  /  Alb  2.7<L>  /  TBili  0.5  /  DBili  x   /  AST  23  /  ALT  32  /  AlkPhos  42  04-08  TPro  6.7  /  Alb  2.8<L>  /  TBili  0.6  /  DBili  x   /  AST  32  /  ALT  33  /  AlkPhos  46  04-07  TPro  6.7  /  Alb  2.9<L>  /  TBili  0.6  /  DBili  x   /  AST  27  /  ALT  40  /  AlkPhos  49  04-06  TPro  7.0  /  Alb  3.0<L>  /  TBili  0.5  /  DBili  x   /  AST  24  /  ALT  34  /  AlkPhos  52  04-05    CAPILLARY BLOOD GLUCOSE      POCT Blood Glucose.: 168 mg/dL (08 Apr 2020 12:23)  POCT Blood Glucose.: 217 mg/dL (08 Apr 2020 07:39)  POCT Blood Glucose.: 292 mg/dL (07 Apr 2020 22:52)  POCT Blood Glucose.: 350 mg/dL (07 Apr 2020 21:21)  POCT Blood Glucose.: 268 mg/dL (07 Apr 2020 17:02)      LIVER FUNCTIONS - ( 08 Apr 2020 05:31 )  Alb: 2.7 g/dL / Pro: 6.3 g/dL / ALK PHOS: 42 u/L / ALT: 32 u/L / AST: 23 u/L / GGT: x               D-Dimer Assay, Quantitative: 3911 ng/mL (04-05 @ 15:38)  D-Dimer Assay, Quantitative: 3143 ng/mL (04-04 @ 06:30)  Procalcitonin, Serum: Test not performed QNS - QUANTITY NOT SUFFICIENT. ng/mL (04-07 @ 05:21)        RECENT CULTURES:        RESPIRATORY CULTURES:          Studies  Chest X-RAY  CT SCAN Chest   Venous Dopplers: LE:   CT Abdomen  Others

## 2020-04-08 NOTE — PROGRESS NOTE ADULT - ASSESSMENT
59 y /o Male  with PMH of HTN, HLD, and DM p/w fever and fatigue x 1 week found to be COVID + as of 3/31/20.     1. Acute hypoxic respiratory failure 2/2 to COVID infection  -COVID + as of 3/31/20.  -supplemental O2 as needed  -s/p abx, IV steroids   -anakinra per med   -pulm f/u , ID f/u, med f/u     2. HTN  -bp improved , c/w antihtn meds as ordered        dvt ppx

## 2020-04-08 NOTE — DIETITIAN INITIAL EVALUATION ADULT. - ADD RECOMMEND
1. Encourage & assist Pt with meals; Monitor PO diet tolerance; Honor food preferences;        2. Monitor labs, hydration status;         3. Suggest outpatient follow up with an endocrinologist to ensure long-term DM diet comprehension and compliance. Suggest outpatient follow up with appropriate RDN for the purposes of long-term nutrition evaluation and diet education;

## 2020-04-08 NOTE — DIETITIAN INITIAL EVALUATION ADULT. - OTHER INFO
Pt 60 yo male with PMHx of HTN, HLD, and DM presented with fever and fatigue x 1 week; Pt COVID+, per chart review. Unable to conduct a face to face interview or nutrition-focused physical exam due to limited contact restrictions related to Pt's medical condition and isolation precautions. Collateral obtained from chart review. Pt tolerated PO diet well reported. No GI distress (nausea/vomiting/diarrhea/constipation) per chart. No chewing or swallowing difficulties at this time per chart. Unable to assess weight or diet history. Of note Pt's HbA1c level 8.3% (4/1). Unable to provide diet education on Consistent Carbohydrate diet at [facility]. Will recommend to follow up with appropriate RDN upon discharge for the purposes of long-term nutrition evaluation and diet education.

## 2020-04-08 NOTE — DIETITIAN INITIAL EVALUATION ADULT. - DIET TYPE
PO diet regular/consistent carbohydrate (no snacks)/DASH/TLC (sodium and cholesterol restricted diet)

## 2020-04-08 NOTE — PROGRESS NOTE ADULT - SUBJECTIVE AND OBJECTIVE BOX
CARDIOLOGY FOLLOW UP - Dr. Piña    CC no acute events        PHYSICAL EXAM:  T(C): 36.8 (04-07-20 @ 20:28), Max: 37.3 (04-07-20 @ 13:00)  HR: 76 (04-08-20 @ 04:49) (76 - 82)  BP: 156/97 (04-08-20 @ 04:49) (114/77 - 156/97)  RR: 18 (04-08-20 @ 04:49) (18 - 20)  SpO2: 93% (04-08-20 @ 04:49) (93% - 100%)  Wt(kg): --  I&O's Summary              MEDICATIONS  (STANDING):  ALBUTerol    90 MICROgram(s) HFA Inhaler 1 Puff(s) Inhalation every 4 hours  amLODIPine   Tablet 10 milliGRAM(s) Oral daily  anakinra Injectable 100 milliGRAM(s) SubCutaneous daily  carvedilol 12.5 milliGRAM(s) Oral every 12 hours  dextrose 5%. 1000 milliLiter(s) (50 mL/Hr) IV Continuous <Continuous>  dextrose 50% Injectable 12.5 Gram(s) IV Push once  dextrose 50% Injectable 25 Gram(s) IV Push once  dextrose 50% Injectable 25 Gram(s) IV Push once  heparin  Injectable 5000 Unit(s) SubCutaneous every 8 hours  influenza   Vaccine 0.5 milliLiter(s) IntraMuscular once  insulin glargine Injectable (LANTUS) 15 Unit(s) SubCutaneous at bedtime  insulin lispro (HumaLOG) corrective regimen sliding scale   SubCutaneous at bedtime  insulin lispro (HumaLOG) corrective regimen sliding scale   SubCutaneous three times a day before meals      TELEMETRY: 	    ECG:  	  RADIOLOGY:   DIAGNOSTIC TESTING:  [ ] Echocardiogram:  [ ]  Catheterization:  [ ] Stress Test:    OTHER: 	    LABS:	 	                            12.7   8.28  )-----------( 303      ( 08 Apr 2020 05:31 )             39.0     04-08    134<L>  |  95<L>  |  38<H>  ----------------------------<  230<H>  3.5   |  30  |  1.31<H>    Ca    8.7      08 Apr 2020 05:31  Phos  3.4     04-07  Mg     2.3     04-07    TPro  6.3  /  Alb  2.7<L>  /  TBili  0.5  /  DBili  x   /  AST  23  /  ALT  32  /  AlkPhos  42  04-08            ·

## 2020-04-08 NOTE — PROGRESS NOTE ADULT - ASSESSMENT
59 y /o Male  with PMH of HTN, HLD, and DM admitted for COVID management    pt w/ symptoms c/w covid  feels better   on n/c /tolerating/ w/ good sats  pulm  to  f/u   steroids over   /+ anakinra /  s/p plaquenil  dvt proph  dm  fsg riss  hold oral meds/  c/w insulin regimen  htn  c/w meds as per cards  o2/nrb   albuterol mdi  f/u inflammatory markers noted

## 2020-04-08 NOTE — PROGRESS NOTE ADULT - SUBJECTIVE AND OBJECTIVE BOX
Patient is a 59y old  Male who presents with a chief complaint of resp failure (07 Apr 2020 15:04)    Contact:  [facility] Pager: [phone]  [email or web address] Pager: [phone]    SUBJECTIVE / OVERNIGHT EVENTS:  No acute events reported overnight. Pt seen and examined at bedside.       MEDICATIONS  (STANDING):  ALBUTerol    90 MICROgram(s) HFA Inhaler 1 Puff(s) Inhalation every 4 hours  amLODIPine   Tablet 10 milliGRAM(s) Oral daily  anakinra Injectable 100 milliGRAM(s) SubCutaneous daily  carvedilol 12.5 milliGRAM(s) Oral every 12 hours  dextrose 5%. 1000 milliLiter(s) (50 mL/Hr) IV Continuous <Continuous>  dextrose 50% Injectable 12.5 Gram(s) IV Push once  dextrose 50% Injectable 25 Gram(s) IV Push once  dextrose 50% Injectable 25 Gram(s) IV Push once  heparin  Injectable 5000 Unit(s) SubCutaneous every 8 hours  influenza   Vaccine 0.5 milliLiter(s) IntraMuscular once  insulin glargine Injectable (LANTUS) 15 Unit(s) SubCutaneous at bedtime  insulin lispro (HumaLOG) corrective regimen sliding scale   SubCutaneous at bedtime  insulin lispro (HumaLOG) corrective regimen sliding scale   SubCutaneous three times a day before meals    MEDICATIONS  (PRN):  acetaminophen   Tablet .. 650 milliGRAM(s) Oral every 4 hours PRN Temp greater or equal to 38.5C (101.3F)  dextrose 40% Gel 15 Gram(s) Oral once PRN Blood Glucose LESS THAN 70 milliGRAM(s)/deciliter  glucagon  Injectable 1 milliGRAM(s) IntraMuscular once PRN Glucose LESS THAN 70 milligrams/deciliter      Vital Signs Last 24 Hrs  T(C): 36.8 (07 Apr 2020 20:28), Max: 37.3 (07 Apr 2020 13:00)  T(F): 98.2 (07 Apr 2020 20:28), Max: 99.2 (07 Apr 2020 13:00)  HR: 76 (08 Apr 2020 04:49) (76 - 82)  BP: 156/97 (08 Apr 2020 04:49) (114/77 - 156/97)  BP(mean): --  RR: 18 (08 Apr 2020 04:49) (18 - 20)  SpO2: 93% (08 Apr 2020 04:49) (93% - 100%)    CAPILLARY BLOOD GLUCOSE      POCT Blood Glucose.: 217 mg/dL (08 Apr 2020 07:39)  POCT Blood Glucose.: 292 mg/dL (07 Apr 2020 22:52)  POCT Blood Glucose.: 350 mg/dL (07 Apr 2020 21:21)  POCT Blood Glucose.: 268 mg/dL (07 Apr 2020 17:02)  POCT Blood Glucose.: 243 mg/dL (07 Apr 2020 12:04)    I&O's Summary      PHYSICAL EXAM:  GENERAL: NAD, well-developed  HEAD:  Atraumatic, Normocephalic  NECK: Supple, No JVD  CHEST/LUNG: course BS b/l  HEART: Regular rate and rhythm; No murmurs, rubs, or gallops  ABDOMEN: Soft, Nontender, Nondistended; Bowel sounds present  EXTREMITIES:  2+ Peripheral Pulses, No clubbing, cyanosis, or edema  PSYCH: AAOx3  NEUROLOGY: non-focal  SKIN: No rashes or lesions    LABS:                        12.7   8.28  )-----------( 303      ( 08 Apr 2020 05:31 )             39.0     Auto Eosinophil # 0.23  / Auto Eosinophil % 2.8   / Auto Neutrophil # 6.09  / Auto Neutrophil % 73.6  / BANDS % x                            13.2   9.03  )-----------( 319      ( 07 Apr 2020 05:21 )             40.6     Auto Eosinophil # 0.28  / Auto Eosinophil % 3.1   / Auto Neutrophil # 6.73  / Auto Neutrophil % 74.5  / BANDS % x        04-08    134<L>  |  95<L>  |  38<H>  ----------------------------<  230<H>  3.5   |  30  |  1.31<H>  04-07    135  |  94<L>  |  33<H>  ----------------------------<  200<H>  3.9   |  28  |  1.16    Ca    8.7      08 Apr 2020 05:31  Mg     2.3     04-07  Phos  3.4     04-07  TPro  6.3  /  Alb  2.7<L>  /  TBili  0.5  /  DBili  x   /  AST  23  /  ALT  32  /  AlkPhos  42  04-08  TPro  6.7  /  Alb  2.8<L>  /  TBili  0.6  /  DBili  x   /  AST  32  /  ALT  33  /  AlkPhos  46  04-07                RESPIRATORY  VENT:    ABG:     VBG:     RADIOLOGY & ADDITIONAL TESTS:  (Imaging Personally Reviewed): REENA    Consultant(s) Notes Reviewed:  Cardiology    Care Discussed with Consultants/Other Providers: REENA

## 2020-04-09 DIAGNOSIS — Z29.9 ENCOUNTER FOR PROPHYLACTIC MEASURES, UNSPECIFIED: ICD-10-CM

## 2020-04-09 DIAGNOSIS — U07.1 COVID-19: ICD-10-CM

## 2020-04-09 LAB
ALBUMIN SERPL ELPH-MCNC: 2.8 G/DL — LOW (ref 3.3–5)
ALBUMIN SERPL ELPH-MCNC: 2.8 G/DL — LOW (ref 3.3–5)
ALP SERPL-CCNC: 42 U/L — SIGNIFICANT CHANGE UP (ref 40–120)
ALP SERPL-CCNC: 42 U/L — SIGNIFICANT CHANGE UP (ref 40–120)
ALT FLD-CCNC: 41 U/L — SIGNIFICANT CHANGE UP (ref 4–41)
ALT FLD-CCNC: 41 U/L — SIGNIFICANT CHANGE UP (ref 4–41)
ANION GAP SERPL CALC-SCNC: 12 MMO/L — SIGNIFICANT CHANGE UP (ref 7–14)
ANION GAP SERPL CALC-SCNC: 12 MMO/L — SIGNIFICANT CHANGE UP (ref 7–14)
AST SERPL-CCNC: 28 U/L — SIGNIFICANT CHANGE UP (ref 4–40)
AST SERPL-CCNC: 28 U/L — SIGNIFICANT CHANGE UP (ref 4–40)
BASOPHILS # BLD AUTO: 0.01 K/UL — SIGNIFICANT CHANGE UP (ref 0–0.2)
BASOPHILS NFR BLD AUTO: 0.2 % — SIGNIFICANT CHANGE UP (ref 0–2)
BILIRUB SERPL-MCNC: 0.6 MG/DL — SIGNIFICANT CHANGE UP (ref 0.2–1.2)
BILIRUB SERPL-MCNC: 0.6 MG/DL — SIGNIFICANT CHANGE UP (ref 0.2–1.2)
BUN SERPL-MCNC: 36 MG/DL — HIGH (ref 7–23)
BUN SERPL-MCNC: 36 MG/DL — HIGH (ref 7–23)
CALCIUM SERPL-MCNC: 8.9 MG/DL — SIGNIFICANT CHANGE UP (ref 8.4–10.5)
CALCIUM SERPL-MCNC: 8.9 MG/DL — SIGNIFICANT CHANGE UP (ref 8.4–10.5)
CHLORIDE SERPL-SCNC: 97 MMOL/L — LOW (ref 98–107)
CHLORIDE SERPL-SCNC: 97 MMOL/L — LOW (ref 98–107)
CO2 SERPL-SCNC: 29 MMOL/L — SIGNIFICANT CHANGE UP (ref 22–31)
CO2 SERPL-SCNC: 29 MMOL/L — SIGNIFICANT CHANGE UP (ref 22–31)
CREAT SERPL-MCNC: 1.23 MG/DL — SIGNIFICANT CHANGE UP (ref 0.5–1.3)
CREAT SERPL-MCNC: 1.23 MG/DL — SIGNIFICANT CHANGE UP (ref 0.5–1.3)
CRP SERPL-MCNC: 15 MG/L — HIGH
D DIMER BLD IA.RAPID-MCNC: 3153 NG/ML — SIGNIFICANT CHANGE UP
EOSINOPHIL # BLD AUTO: 0.2 K/UL — SIGNIFICANT CHANGE UP (ref 0–0.5)
EOSINOPHIL NFR BLD AUTO: 3 % — SIGNIFICANT CHANGE UP (ref 0–6)
FERRITIN SERPL-MCNC: 432.7 NG/ML — HIGH (ref 30–400)
GLUCOSE BLDC GLUCOMTR-MCNC: 142 MG/DL — HIGH (ref 70–99)
GLUCOSE BLDC GLUCOMTR-MCNC: 152 MG/DL — HIGH (ref 70–99)
GLUCOSE BLDC GLUCOMTR-MCNC: 167 MG/DL — HIGH (ref 70–99)
GLUCOSE BLDC GLUCOMTR-MCNC: 239 MG/DL — HIGH (ref 70–99)
GLUCOSE SERPL-MCNC: 148 MG/DL — HIGH (ref 70–99)
GLUCOSE SERPL-MCNC: 148 MG/DL — HIGH (ref 70–99)
HCT VFR BLD CALC: 39 % — SIGNIFICANT CHANGE UP (ref 39–50)
HGB BLD-MCNC: 12.4 G/DL — LOW (ref 13–17)
IMM GRANULOCYTES NFR BLD AUTO: 1.7 % — HIGH (ref 0–1.5)
LYMPHOCYTES # BLD AUTO: 1.31 K/UL — SIGNIFICANT CHANGE UP (ref 1–3.3)
LYMPHOCYTES # BLD AUTO: 19.8 % — SIGNIFICANT CHANGE UP (ref 13–44)
MAGNESIUM SERPL-MCNC: 2.4 MG/DL — SIGNIFICANT CHANGE UP (ref 1.6–2.6)
MCHC RBC-ENTMCNC: 26.7 PG — LOW (ref 27–34)
MCHC RBC-ENTMCNC: 31.8 % — LOW (ref 32–36)
MCV RBC AUTO: 83.9 FL — SIGNIFICANT CHANGE UP (ref 80–100)
MONOCYTES # BLD AUTO: 0.71 K/UL — SIGNIFICANT CHANGE UP (ref 0–0.9)
MONOCYTES NFR BLD AUTO: 10.8 % — SIGNIFICANT CHANGE UP (ref 2–14)
NEUTROPHILS # BLD AUTO: 4.26 K/UL — SIGNIFICANT CHANGE UP (ref 1.8–7.4)
NEUTROPHILS NFR BLD AUTO: 64.5 % — SIGNIFICANT CHANGE UP (ref 43–77)
NRBC # FLD: 0 K/UL — SIGNIFICANT CHANGE UP (ref 0–0)
PHOSPHATE SERPL-MCNC: 4.2 MG/DL — SIGNIFICANT CHANGE UP (ref 2.5–4.5)
PLATELET # BLD AUTO: 318 K/UL — SIGNIFICANT CHANGE UP (ref 150–400)
PMV BLD: 11.1 FL — SIGNIFICANT CHANGE UP (ref 7–13)
POTASSIUM SERPL-MCNC: 3.7 MMOL/L — SIGNIFICANT CHANGE UP (ref 3.5–5.3)
POTASSIUM SERPL-MCNC: 3.7 MMOL/L — SIGNIFICANT CHANGE UP (ref 3.5–5.3)
POTASSIUM SERPL-SCNC: 3.7 MMOL/L — SIGNIFICANT CHANGE UP (ref 3.5–5.3)
POTASSIUM SERPL-SCNC: 3.7 MMOL/L — SIGNIFICANT CHANGE UP (ref 3.5–5.3)
PROCALCITONIN SERPL-MCNC: 0.16 NG/ML — HIGH (ref 0.02–0.1)
PROT SERPL-MCNC: 6.5 G/DL — SIGNIFICANT CHANGE UP (ref 6–8.3)
PROT SERPL-MCNC: 6.5 G/DL — SIGNIFICANT CHANGE UP (ref 6–8.3)
RBC # BLD: 4.65 M/UL — SIGNIFICANT CHANGE UP (ref 4.2–5.8)
RBC # FLD: 14.6 % — HIGH (ref 10.3–14.5)
SODIUM SERPL-SCNC: 138 MMOL/L — SIGNIFICANT CHANGE UP (ref 135–145)
SODIUM SERPL-SCNC: 138 MMOL/L — SIGNIFICANT CHANGE UP (ref 135–145)
WBC # BLD: 6.6 K/UL — SIGNIFICANT CHANGE UP (ref 3.8–10.5)
WBC # FLD AUTO: 6.6 K/UL — SIGNIFICANT CHANGE UP (ref 3.8–10.5)

## 2020-04-09 RX ORDER — HEPARIN SODIUM 5000 [USP'U]/ML
7500 INJECTION INTRAVENOUS; SUBCUTANEOUS EVERY 8 HOURS
Refills: 0 | Status: DISCONTINUED | OUTPATIENT
Start: 2020-04-09 | End: 2020-04-15

## 2020-04-09 RX ADMIN — Medication 2: at 17:20

## 2020-04-09 RX ADMIN — Medication 3 UNIT(S): at 17:20

## 2020-04-09 RX ADMIN — HEPARIN SODIUM 7500 UNIT(S): 5000 INJECTION INTRAVENOUS; SUBCUTANEOUS at 12:48

## 2020-04-09 RX ADMIN — Medication 3 UNIT(S): at 12:47

## 2020-04-09 RX ADMIN — Medication 100 MILLIGRAM(S): at 12:53

## 2020-04-09 RX ADMIN — Medication 3 UNIT(S): at 08:20

## 2020-04-09 RX ADMIN — INSULIN GLARGINE 18 UNIT(S): 100 INJECTION, SOLUTION SUBCUTANEOUS at 21:28

## 2020-04-09 RX ADMIN — CARVEDILOL PHOSPHATE 12.5 MILLIGRAM(S): 80 CAPSULE, EXTENDED RELEASE ORAL at 05:27

## 2020-04-09 RX ADMIN — HEPARIN SODIUM 7500 UNIT(S): 5000 INJECTION INTRAVENOUS; SUBCUTANEOUS at 21:28

## 2020-04-09 RX ADMIN — HEPARIN SODIUM 5000 UNIT(S): 5000 INJECTION INTRAVENOUS; SUBCUTANEOUS at 05:27

## 2020-04-09 RX ADMIN — CARVEDILOL PHOSPHATE 12.5 MILLIGRAM(S): 80 CAPSULE, EXTENDED RELEASE ORAL at 17:21

## 2020-04-09 RX ADMIN — Medication 2: at 12:47

## 2020-04-09 RX ADMIN — AMLODIPINE BESYLATE 10 MILLIGRAM(S): 2.5 TABLET ORAL at 05:27

## 2020-04-09 NOTE — PROGRESS NOTE ADULT - SUBJECTIVE AND OBJECTIVE BOX
Patient is a 59y old  Male who presents with a chief complaint of covid pneumonia (08 Apr 2020 14:51)    Contact:  [facility] Pager: [phone]  [email or web address] Pager: [phone]    SUBJECTIVE / OVERNIGHT EVENTS:  No acute events reported overnight. Pt seen and examined at bedside.     **note in progress  MEDICATIONS  (STANDING):  ALBUTerol    90 MICROgram(s) HFA Inhaler 1 Puff(s) Inhalation every 4 hours  amLODIPine   Tablet 10 milliGRAM(s) Oral daily  anakinra Injectable 100 milliGRAM(s) SubCutaneous daily  carvedilol 12.5 milliGRAM(s) Oral every 12 hours  dextrose 5%. 1000 milliLiter(s) (50 mL/Hr) IV Continuous <Continuous>  dextrose 50% Injectable 12.5 Gram(s) IV Push once  dextrose 50% Injectable 25 Gram(s) IV Push once  dextrose 50% Injectable 25 Gram(s) IV Push once  heparin  Injectable 5000 Unit(s) SubCutaneous every 8 hours  influenza   Vaccine 0.5 milliLiter(s) IntraMuscular once  insulin glargine Injectable (LANTUS) 18 Unit(s) SubCutaneous at bedtime  insulin lispro (HumaLOG) corrective regimen sliding scale   SubCutaneous at bedtime  insulin lispro (HumaLOG) corrective regimen sliding scale   SubCutaneous three times a day before meals  insulin lispro Injectable (HumaLOG) 3 Unit(s) SubCutaneous three times a day before meals    MEDICATIONS  (PRN):  acetaminophen   Tablet .. 650 milliGRAM(s) Oral every 4 hours PRN Temp greater or equal to 38.5C (101.3F)  dextrose 40% Gel 15 Gram(s) Oral once PRN Blood Glucose LESS THAN 70 milliGRAM(s)/deciliter  glucagon  Injectable 1 milliGRAM(s) IntraMuscular once PRN Glucose LESS THAN 70 milligrams/deciliter      Vital Signs Last 24 Hrs  T(C): 37.1 (09 Apr 2020 05:23), Max: 37.2 (08 Apr 2020 17:16)  T(F): 98.7 (09 Apr 2020 05:23), Max: 98.9 (08 Apr 2020 17:16)  HR: 65 (09 Apr 2020 05:23) (65 - 78)  BP: 151/94 (09 Apr 2020 05:23) (121/74 - 151/94)  BP(mean): --  RR: 18 (09 Apr 2020 05:23) (17 - 18)  SpO2: 98% (09 Apr 2020 05:23) (97% - 98%)    CAPILLARY BLOOD GLUCOSE      POCT Blood Glucose.: 191 mg/dL (08 Apr 2020 21:42)  POCT Blood Glucose.: 231 mg/dL (08 Apr 2020 16:56)  POCT Blood Glucose.: 168 mg/dL (08 Apr 2020 12:23)  POCT Blood Glucose.: 217 mg/dL (08 Apr 2020 07:39)    I&O's Summary      PHYSICAL EXAM:  GENERAL: NAD, well-developed  HEAD:  Atraumatic, Normocephalic  EYES: EOMI, PERRLA, conjunctiva and sclera clear  NECK: Supple, No JVD  CHEST/LUNG: Clear to auscultation bilaterally; No wheeze  HEART: Regular rate and rhythm; No murmurs, rubs, or gallops  ABDOMEN: Soft, Nontender, Nondistended; Bowel sounds present  EXTREMITIES:  2+ Peripheral Pulses, No clubbing, cyanosis, or edema  PSYCH: AAOx3  NEUROLOGY: non-focal  SKIN: No rashes or lesions    LABS:                        12.7   8.28  )-----------( 303      ( 08 Apr 2020 05:31 )             39.0     Auto Eosinophil # 0.23  / Auto Eosinophil % 2.8   / Auto Neutrophil # 6.09  / Auto Neutrophil % 73.6  / BANDS % x        04-08    134<L>  |  95<L>  |  38<H>  ----------------------------<  230<H>  3.5   |  30  |  1.31<H>    Ca    8.7      08 Apr 2020 05:31  TPro  6.3  /  Alb  2.7<L>  /  TBili  0.5  /  DBili  x   /  AST  23  /  ALT  32  /  AlkPhos  42  04-08                RESPIRATORY  VENT:    ABG:     VBG:     RADIOLOGY & ADDITIONAL TESTS:  (Imaging Personally Reviewed)    Consultant(s) Notes Reviewed:      Care Discussed with Consultants/Other Providers: Patient is a 59y old  Male who presents with a chief complaint of covid pneumonia (08 Apr 2020 14:51)    Contact:  [facility] Pager: [phone]  [employer] Pager: [phone]    SUBJECTIVE / OVERNIGHT EVENTS:  No acute events reported overnight. Pt seen and examined at bedside. Pt conveys that he feels fine, though has occasional cough or SOB.      MEDICATIONS  (STANDING):  ALBUTerol    90 MICROgram(s) HFA Inhaler 1 Puff(s) Inhalation every 4 hours  amLODIPine   Tablet 10 milliGRAM(s) Oral daily  anakinra Injectable 100 milliGRAM(s) SubCutaneous daily  carvedilol 12.5 milliGRAM(s) Oral every 12 hours  dextrose 5%. 1000 milliLiter(s) (50 mL/Hr) IV Continuous <Continuous>  dextrose 50% Injectable 12.5 Gram(s) IV Push once  dextrose 50% Injectable 25 Gram(s) IV Push once  dextrose 50% Injectable 25 Gram(s) IV Push once  heparin  Injectable 5000 Unit(s) SubCutaneous every 8 hours  influenza   Vaccine 0.5 milliLiter(s) IntraMuscular once  insulin glargine Injectable (LANTUS) 18 Unit(s) SubCutaneous at bedtime  insulin lispro (HumaLOG) corrective regimen sliding scale   SubCutaneous at bedtime  insulin lispro (HumaLOG) corrective regimen sliding scale   SubCutaneous three times a day before meals  insulin lispro Injectable (HumaLOG) 3 Unit(s) SubCutaneous three times a day before meals    MEDICATIONS  (PRN):  acetaminophen   Tablet .. 650 milliGRAM(s) Oral every 4 hours PRN Temp greater or equal to 38.5C (101.3F)  dextrose 40% Gel 15 Gram(s) Oral once PRN Blood Glucose LESS THAN 70 milliGRAM(s)/deciliter  glucagon  Injectable 1 milliGRAM(s) IntraMuscular once PRN Glucose LESS THAN 70 milligrams/deciliter      Vital Signs Last 24 Hrs  T(C): 37.1 (09 Apr 2020 05:23), Max: 37.2 (08 Apr 2020 17:16)  T(F): 98.7 (09 Apr 2020 05:23), Max: 98.9 (08 Apr 2020 17:16)  HR: 65 (09 Apr 2020 05:23) (65 - 78)  BP: 151/94 (09 Apr 2020 05:23) (121/74 - 151/94)  BP(mean): --  RR: 18 (09 Apr 2020 05:23) (17 - 18)  SpO2: 98% (09 Apr 2020 05:23) (97% - 98%)    CAPILLARY BLOOD GLUCOSE      POCT Blood Glucose.: 191 mg/dL (08 Apr 2020 21:42)  POCT Blood Glucose.: 231 mg/dL (08 Apr 2020 16:56)  POCT Blood Glucose.: 168 mg/dL (08 Apr 2020 12:23)  POCT Blood Glucose.: 217 mg/dL (08 Apr 2020 07:39)    I&O's Summary      PHYSICAL EXAM:  GENERAL: NAD, well-developed  HEAD:  Atraumatic, Normocephalic  NECK: Supple, No JVD  CHEST/LUNG: course BS b/l  HEART: Regular rate and rhythm; No murmurs, rubs, or gallops  ABDOMEN: Soft, Nontender, Nondistended; Bowel sounds present  EXTREMITIES:  2+ Peripheral Pulses, No clubbing, cyanosis, or edema  PSYCH: AAOx3  NEUROLOGY: non-focal  SKIN: No rashes or lesions      LABS:                        12.7   8.28  )-----------( 303      ( 08 Apr 2020 05:31 )             39.0     Auto Eosinophil # 0.23  / Auto Eosinophil % 2.8   / Auto Neutrophil # 6.09  / Auto Neutrophil % 73.6  / BANDS % x        04-08    134<L>  |  95<L>  |  38<H>  ----------------------------<  230<H>  3.5   |  30  |  1.31<H>    Ca    8.7      08 Apr 2020 05:31  TPro  6.3  /  Alb  2.7<L>  /  TBili  0.5  /  DBili  x   /  AST  23  /  ALT  32  /  AlkPhos  42  04-08                RESPIRATORY  VENT:    ABG:     VBG:     RADIOLOGY & ADDITIONAL TESTS:  (Imaging Personally Reviewed): REENA    Consultant(s) Notes Reviewed:  pulm, cardiology     Care Discussed with Consultants/Other Providers: REENA

## 2020-04-09 NOTE — PROGRESS NOTE ADULT - ASSESSMENT
59 y /o Male  with PMH of HTN, HLD, and DM p/w fever and fatigue x 1 week found to be COVID + as of 3/31/20.     1. Acute hypoxic respiratory failure 2/2 to COVID infection  -COVID + as of 3/31/20.  -supplemental O2 as needed  -s/p abx, IV steroids   -on anakinra  -pulm f/u , ID f/u, med f/u     2. HTN  -bp improved , c/w antihtn meds as ordered        dvt ppx

## 2020-04-09 NOTE — PROGRESS NOTE ADULT - ASSESSMENT
pt w/ symptoms c/w covid    COVID PNEUMONIA    on 13 L of nasal cannula  D5 of anakinra: bid dosing:  finished steroids as well as Plaquenil  D D WENDY: CRP AND FERRITIN ALL HIGH:  FOLLOW UP IN AM   DVT PROPAHYXLIS     4/6:  much better : 11 100% sao2  d6 of anakinra bid dosing: crp has come down as well as ferritin: d dimer is still high   dvt prophylaxis  cont to titrate down fio2    4/7: doing much better:  on 8 l : o 2 sao2 100%:   now anakinra on one a day dosing: stop after three days:  dvt prophylaxis     4/8:    on 6 L of oxygen now: doing a little better:   on anakinra q daily dosages:   todays is in [place]: would defer to primary team for management  dvt propahyxlisx:  his oxygenation seems t o be better but now heis in jace:   FOLLOW UP FERRITIN, D DIMER: AND CRP IN AM     4/9:  on 4 l of oxygen  Ankinra will finish tomorrow  jace perpri may team   d dimer is higgh but has not increased from before:  dvt propahylaxis  dw team      DM  HTN

## 2020-04-09 NOTE — PROGRESS NOTE ADULT - SUBJECTIVE AND OBJECTIVE BOX
Patient is a 59y old  Male who presents with a chief complaint of covid pneumonia (08 Apr 2020 14:51)      Any change in ROS: doing ok : oxygen requirement has improved   on 4 L 98%      MEDICATIONS  (STANDING):  ALBUTerol    90 MICROgram(s) HFA Inhaler 1 Puff(s) Inhalation every 4 hours  amLODIPine   Tablet 10 milliGRAM(s) Oral daily  anakinra Injectable 100 milliGRAM(s) SubCutaneous daily  carvedilol 12.5 milliGRAM(s) Oral every 12 hours  dextrose 5%. 1000 milliLiter(s) (50 mL/Hr) IV Continuous <Continuous>  dextrose 50% Injectable 12.5 Gram(s) IV Push once  dextrose 50% Injectable 25 Gram(s) IV Push once  dextrose 50% Injectable 25 Gram(s) IV Push once  heparin  Injectable 7500 Unit(s) SubCutaneous every 8 hours  influenza   Vaccine 0.5 milliLiter(s) IntraMuscular once  insulin glargine Injectable (LANTUS) 18 Unit(s) SubCutaneous at bedtime  insulin lispro (HumaLOG) corrective regimen sliding scale   SubCutaneous at bedtime  insulin lispro (HumaLOG) corrective regimen sliding scale   SubCutaneous three times a day before meals  insulin lispro Injectable (HumaLOG) 3 Unit(s) SubCutaneous three times a day before meals    MEDICATIONS  (PRN):  acetaminophen   Tablet .. 650 milliGRAM(s) Oral every 4 hours PRN Temp greater or equal to 38.5C (101.3F)  dextrose 40% Gel 15 Gram(s) Oral once PRN Blood Glucose LESS THAN 70 milliGRAM(s)/deciliter  glucagon  Injectable 1 milliGRAM(s) IntraMuscular once PRN Glucose LESS THAN 70 milligrams/deciliter    Vital Signs Last 24 Hrs  T(C): 36.9 (09 Apr 2020 12:27), Max: 37.2 (08 Apr 2020 17:16)  T(F): 98.4 (09 Apr 2020 12:27), Max: 98.9 (08 Apr 2020 17:16)  HR: 74 (09 Apr 2020 12:27) (65 - 78)  BP: 145/100 (09 Apr 2020 12:27) (121/74 - 151/94)  BP(mean): --  RR: 18 (09 Apr 2020 12:27) (17 - 18)  SpO2: 98% (09 Apr 2020 12:27) (97% - 98%)    I&O's Summary        Physical Exam:   GENERAL: NAD, well-groomed, well-developed  HEENT: JILL/   Atraumatic, Normocephalic  ENMT: No tonsillar erythema, exudates, or enlargement; Moist mucous membranes, Good dentition, No lesions  NECK: Supple, No JVD, Normal thyroid  CHEST/LUNG: Clear to auscultaion, ; No rales, rhonchi, wheezing, or rubs  CVS: Regular rate and rhythm; No murmurs, rubs, or gallops  GI: : Soft, Nontender, Nondistended; Bowel sounds present  NERVOUS SYSTEM:  Alert & Oriented X3, Good concentration; Motor Strength 5/5 B/L upper and lower extremities; DTRs 2+ intact and symmetric  EXTREMITIES:  2+ Peripheral Pulses, No clubbing, cyanosis, or edema  LYMPH: No lymphadenopathy noted  SKIN: No rashes or lesions  ENDOCRINOLOGY: No Thyromegaly  PSYCH: Appropriate    Labs:                              12.4   6.60  )-----------( 318      ( 09 Apr 2020 05:40 )             39.0                         12.7   8.28  )-----------( 303      ( 08 Apr 2020 05:31 )             39.0                         13.2   9.03  )-----------( 319      ( 07 Apr 2020 05:21 )             40.6                         14.0   10.57 )-----------( 363      ( 06 Apr 2020 07:53 )             42.6     04-09    138  |  97<L>  |  36<H>  ----------------------------<  148<H>  3.7   |  29  |  1.23  04-08    134<L>  |  95<L>  |  38<H>  ----------------------------<  230<H>  3.5   |  30  |  1.31<H>  04-07    135  |  94<L>  |  33<H>  ----------------------------<  200<H>  3.9   |  28  |  1.16  04-06    137  |  96<L>  |  36<H>  ----------------------------<  196<H>  3.7   |  29  |  1.27    Ca    8.9      09 Apr 2020 05:40  Ca    8.7      08 Apr 2020 05:31  Phos  4.2     04-09  Mg     2.4     04-09    TPro  6.5  /  Alb  2.8<L>  /  TBili  0.6  /  DBili  x   /  AST  28  /  ALT  41  /  AlkPhos  42  04-09  TPro  6.3  /  Alb  2.7<L>  /  TBili  0.5  /  DBili  x   /  AST  23  /  ALT  32  /  AlkPhos  42  04-08  TPro  6.7  /  Alb  2.8<L>  /  TBili  0.6  /  DBili  x   /  AST  32  /  ALT  33  /  AlkPhos  46  04-07  TPro  6.7  /  Alb  2.9<L>  /  TBili  0.6  /  DBili  x   /  AST  27  /  ALT  40  /  AlkPhos  49  04-06    CAPILLARY BLOOD GLUCOSE      POCT Blood Glucose.: 152 mg/dL (09 Apr 2020 11:52)  POCT Blood Glucose.: 142 mg/dL (09 Apr 2020 07:43)  POCT Blood Glucose.: 191 mg/dL (08 Apr 2020 21:42)  POCT Blood Glucose.: 231 mg/dL (08 Apr 2020 16:56)      LIVER FUNCTIONS - ( 09 Apr 2020 05:40 )  Alb: 2.8 g/dL / Pro: 6.5 g/dL / ALK PHOS: 42 u/L / ALT: 41 u/L / AST: 28 u/L / GGT: x               D-Dimer Assay, Quantitative: 3153 ng/mL (04-09 @ 05:40)  D-Dimer Assay, Quantitative: 3911 ng/mL (04-05 @ 15:38)  D-Dimer Assay, Quantitative: 3143 ng/mL (04-04 @ 06:30)  Procalcitonin, Serum: 0.16 ng/mL (04-09 @ 05:40)  Procalcitonin, Serum: Test not performed QNS - QUANTITY NOT SUFFICIENT. ng/mL (04-07 @ 05:21)        RECENT CULTURES:  < from: Xray Chest 1 View- PORTABLE-Urgent (03.31.20 @ 02:54) >  EXAM:  XR CHEST PORTABLE URGENT 1V        PROCEDURE DATE:  Mar 31 2020         INTERPRETATION:  CLINICAL INFORMATION: Fever and shortness of breath.    EXAM: 1 view of the chest.    COMPARISON: None.    FINDINGS:    Low lung volumes limiting evaluation. Patchy bilateral airspace opacities. No pneumothorax. The heart is enlarged.     IMPRESSION: Limited study but suspicion for opacities consistent with covid 19 infection.                CANDI NASH M.D., RADIOLOGY RESIDENT  This documenthas been electronically signed.  ANDREZ VERNON M.D., ATTENDING RADIOLOGIST  This document has been electronically signed. Mar 31 2020  7:09AM                < end of copied text >        RESPIRATORY CULTURES:          Studies  Chest X-RAY  CT SCAN Chest   Venous Dopplers: LE:   CT Abdomen  Others

## 2020-04-09 NOTE — PROGRESS NOTE ADULT - SUBJECTIVE AND OBJECTIVE BOX
CARDIOLOGY FOLLOW UP - Dr. Piña    CC no acute events   on NC 4 with good O2 sats       PHYSICAL EXAM:  T(C): 37.1 (04-09-20 @ 05:23), Max: 37.2 (04-08-20 @ 17:16)  HR: 65 (04-09-20 @ 05:23) (65 - 78)  BP: 151/94 (04-09-20 @ 05:23) (121/74 - 151/94)  RR: 18 (04-09-20 @ 05:23) (17 - 18)  SpO2: 98% (04-09-20 @ 05:23) (97% - 98%)  Wt(kg): --  I&O's Summary          MEDICATIONS  (STANDING):  ALBUTerol    90 MICROgram(s) HFA Inhaler 1 Puff(s) Inhalation every 4 hours  amLODIPine   Tablet 10 milliGRAM(s) Oral daily  anakinra Injectable 100 milliGRAM(s) SubCutaneous daily  carvedilol 12.5 milliGRAM(s) Oral every 12 hours  dextrose 5%. 1000 milliLiter(s) (50 mL/Hr) IV Continuous <Continuous>  dextrose 50% Injectable 12.5 Gram(s) IV Push once  dextrose 50% Injectable 25 Gram(s) IV Push once  dextrose 50% Injectable 25 Gram(s) IV Push once  heparin  Injectable 7500 Unit(s) SubCutaneous every 8 hours  influenza   Vaccine 0.5 milliLiter(s) IntraMuscular once  insulin glargine Injectable (LANTUS) 18 Unit(s) SubCutaneous at bedtime  insulin lispro (HumaLOG) corrective regimen sliding scale   SubCutaneous at bedtime  insulin lispro (HumaLOG) corrective regimen sliding scale   SubCutaneous three times a day before meals  insulin lispro Injectable (HumaLOG) 3 Unit(s) SubCutaneous three times a day before meals      TELEMETRY: 	    ECG:  	  RADIOLOGY:   DIAGNOSTIC TESTING:  [ ] Echocardiogram:  [ ]  Catheterization:  [ ] Stress Test:    OTHER: 	    LABS:	 	                            12.4   6.60  )-----------( 318      ( 09 Apr 2020 05:40 )             39.0     04-09    138  |  97<L>  |  36<H>  ----------------------------<  148<H>  3.7   |  29  |  1.23    Ca    8.9      09 Apr 2020 05:40  Phos  4.2     04-09  Mg     2.4     04-09    TPro  6.5  /  Alb  2.8<L>  /  TBili  0.6  /  DBili  x   /  AST  28  /  ALT  41  /  AlkPhos  42  04-09

## 2020-04-10 LAB
ALBUMIN SERPL ELPH-MCNC: 2.8 G/DL — LOW (ref 3.3–5)
ALP SERPL-CCNC: 38 U/L — LOW (ref 40–120)
ALT FLD-CCNC: 48 U/L — HIGH (ref 4–41)
ANION GAP SERPL CALC-SCNC: 12 MMO/L — SIGNIFICANT CHANGE UP (ref 7–14)
AST SERPL-CCNC: 31 U/L — SIGNIFICANT CHANGE UP (ref 4–40)
BASOPHILS # BLD AUTO: 0.01 K/UL — SIGNIFICANT CHANGE UP (ref 0–0.2)
BASOPHILS NFR BLD AUTO: 0.2 % — SIGNIFICANT CHANGE UP (ref 0–2)
BILIRUB SERPL-MCNC: 0.4 MG/DL — SIGNIFICANT CHANGE UP (ref 0.2–1.2)
BUN SERPL-MCNC: 28 MG/DL — HIGH (ref 7–23)
CALCIUM SERPL-MCNC: 8.7 MG/DL — SIGNIFICANT CHANGE UP (ref 8.4–10.5)
CHLORIDE SERPL-SCNC: 99 MMOL/L — SIGNIFICANT CHANGE UP (ref 98–107)
CO2 SERPL-SCNC: 29 MMOL/L — SIGNIFICANT CHANGE UP (ref 22–31)
CREAT SERPL-MCNC: 1.12 MG/DL — SIGNIFICANT CHANGE UP (ref 0.5–1.3)
CRP SERPL-MCNC: 9.2 MG/L — HIGH
D DIMER BLD IA.RAPID-MCNC: 3054 NG/ML — SIGNIFICANT CHANGE UP
EOSINOPHIL # BLD AUTO: 0.13 K/UL — SIGNIFICANT CHANGE UP (ref 0–0.5)
EOSINOPHIL NFR BLD AUTO: 2 % — SIGNIFICANT CHANGE UP (ref 0–6)
FERRITIN SERPL-MCNC: 444.6 NG/ML — HIGH (ref 30–400)
GLUCOSE BLDC GLUCOMTR-MCNC: 116 MG/DL — HIGH (ref 70–99)
GLUCOSE BLDC GLUCOMTR-MCNC: 136 MG/DL — HIGH (ref 70–99)
GLUCOSE BLDC GLUCOMTR-MCNC: 251 MG/DL — HIGH (ref 70–99)
GLUCOSE BLDC GLUCOMTR-MCNC: 97 MG/DL — SIGNIFICANT CHANGE UP (ref 70–99)
GLUCOSE SERPL-MCNC: 108 MG/DL — HIGH (ref 70–99)
HCT VFR BLD CALC: 39.2 % — SIGNIFICANT CHANGE UP (ref 39–50)
HGB BLD-MCNC: 12.5 G/DL — LOW (ref 13–17)
IMM GRANULOCYTES NFR BLD AUTO: 1.1 % — SIGNIFICANT CHANGE UP (ref 0–1.5)
LYMPHOCYTES # BLD AUTO: 1.12 K/UL — SIGNIFICANT CHANGE UP (ref 1–3.3)
LYMPHOCYTES # BLD AUTO: 17.5 % — SIGNIFICANT CHANGE UP (ref 13–44)
MAGNESIUM SERPL-MCNC: 2.1 MG/DL — SIGNIFICANT CHANGE UP (ref 1.6–2.6)
MCHC RBC-ENTMCNC: 26.3 PG — LOW (ref 27–34)
MCHC RBC-ENTMCNC: 31.9 % — LOW (ref 32–36)
MCV RBC AUTO: 82.5 FL — SIGNIFICANT CHANGE UP (ref 80–100)
MONOCYTES # BLD AUTO: 0.77 K/UL — SIGNIFICANT CHANGE UP (ref 0–0.9)
MONOCYTES NFR BLD AUTO: 12.1 % — SIGNIFICANT CHANGE UP (ref 2–14)
NEUTROPHILS # BLD AUTO: 4.29 K/UL — SIGNIFICANT CHANGE UP (ref 1.8–7.4)
NEUTROPHILS NFR BLD AUTO: 67.1 % — SIGNIFICANT CHANGE UP (ref 43–77)
NRBC # FLD: 0 K/UL — SIGNIFICANT CHANGE UP (ref 0–0)
PHOSPHATE SERPL-MCNC: 3.4 MG/DL — SIGNIFICANT CHANGE UP (ref 2.5–4.5)
PLATELET # BLD AUTO: 316 K/UL — SIGNIFICANT CHANGE UP (ref 150–400)
PMV BLD: 11 FL — SIGNIFICANT CHANGE UP (ref 7–13)
POTASSIUM SERPL-MCNC: 3.6 MMOL/L — SIGNIFICANT CHANGE UP (ref 3.5–5.3)
POTASSIUM SERPL-SCNC: 3.6 MMOL/L — SIGNIFICANT CHANGE UP (ref 3.5–5.3)
PROT SERPL-MCNC: 6.2 G/DL — SIGNIFICANT CHANGE UP (ref 6–8.3)
RBC # BLD: 4.75 M/UL — SIGNIFICANT CHANGE UP (ref 4.2–5.8)
RBC # FLD: 14.3 % — SIGNIFICANT CHANGE UP (ref 10.3–14.5)
SODIUM SERPL-SCNC: 140 MMOL/L — SIGNIFICANT CHANGE UP (ref 135–145)
WBC # BLD: 6.39 K/UL — SIGNIFICANT CHANGE UP (ref 3.8–10.5)
WBC # FLD AUTO: 6.39 K/UL — SIGNIFICANT CHANGE UP (ref 3.8–10.5)

## 2020-04-10 RX ADMIN — Medication 3 UNIT(S): at 08:04

## 2020-04-10 RX ADMIN — Medication 100 MILLIGRAM(S): at 05:21

## 2020-04-10 RX ADMIN — HEPARIN SODIUM 7500 UNIT(S): 5000 INJECTION INTRAVENOUS; SUBCUTANEOUS at 05:21

## 2020-04-10 RX ADMIN — CARVEDILOL PHOSPHATE 12.5 MILLIGRAM(S): 80 CAPSULE, EXTENDED RELEASE ORAL at 05:22

## 2020-04-10 RX ADMIN — CARVEDILOL PHOSPHATE 12.5 MILLIGRAM(S): 80 CAPSULE, EXTENDED RELEASE ORAL at 17:18

## 2020-04-10 RX ADMIN — Medication 3 UNIT(S): at 12:15

## 2020-04-10 RX ADMIN — Medication 1: at 22:41

## 2020-04-10 RX ADMIN — Medication 3 UNIT(S): at 17:18

## 2020-04-10 RX ADMIN — Medication 100 MILLIGRAM(S): at 12:15

## 2020-04-10 RX ADMIN — AMLODIPINE BESYLATE 10 MILLIGRAM(S): 2.5 TABLET ORAL at 05:21

## 2020-04-10 RX ADMIN — HEPARIN SODIUM 7500 UNIT(S): 5000 INJECTION INTRAVENOUS; SUBCUTANEOUS at 12:15

## 2020-04-10 RX ADMIN — HEPARIN SODIUM 7500 UNIT(S): 5000 INJECTION INTRAVENOUS; SUBCUTANEOUS at 22:41

## 2020-04-10 RX ADMIN — INSULIN GLARGINE 18 UNIT(S): 100 INJECTION, SOLUTION SUBCUTANEOUS at 22:40

## 2020-04-10 NOTE — PROGRESS NOTE ADULT - ASSESSMENT
pt w/ symptoms c/w covid    COVID PNEUMONIA    on 13 L of nasal cannula  D5 of anakinra: bid dosing:  finished steroids as well as Plaquenil  D D WENDY: CRP AND FERRITIN ALL HIGH:  FOLLOW UP IN AM   DVT PROPAHYXLIS     4/6:  much better : 11 100% sao2  d6 of anakinra bid dosing: crp has come down as well as ferritin: d dimer is still high   dvt prophylaxis  cont to titrate down fio2    4/7: doing much better:  on 8 l : o 2 sao2 100%:   now anakinra on one a day dosing: stop after three days:  dvt prophylaxis     4/8:    on 6 L of oxygen now: doing a little better:   on anakinra q daily dosages:   todays is in [place]: would defer to primary team for management  dvt propahyxlisx:  his oxygenation seems t o be better but now heis in jace:   FOLLOW UP FERRITIN, D DIMER: AND CRP IN AM     4/9:  on 4 l of oxygen  Ankinra will finish tomorrow  jace perpri may team   d dimer is higgh but has not increased from before:  dvt propahylaxis  dw team     4/10:  suzy ok : no SOB : no cugh :  nasal cannula: 23?l  per minute:   finished rx:   on low dose nasal cannula   per primary team   DM  HTN

## 2020-04-10 NOTE — PROGRESS NOTE ADULT - PROBLEM SELECTOR PLAN 1
c/w anakinra to end on 4/10  c/w supportive care  titrate off O2 as tolerated c/w anakinra to end on 4/10  c/w supportive care  titrate off O2 as tolerated. As per RN, patient Spo2>92% on ambulation however when sat back down, desat to high 70s and placed back on NC.   Inflammatory markers including CRP, ferritin, and D-dimer are downtrending.

## 2020-04-10 NOTE — PROGRESS NOTE ADULT - SUBJECTIVE AND OBJECTIVE BOX
CARDIOLOGY FOLLOW UP - Dr. Piña    CC no acute events       PHYSICAL EXAM:  T(C): 36.9 (04-10-20 @ 05:20), Max: 36.9 (04-09-20 @ 12:27)  HR: 76 (04-10-20 @ 05:20) (72 - 86)  BP: 150/95 (04-10-20 @ 05:20) (145/90 - 150/95)  RR: 17 (04-10-20 @ 10:36) (17 - 19)  SpO2: 96% (04-10-20 @ 10:36) (92% - 99%)  Wt(kg): --  I&O's Summary              MEDICATIONS  (STANDING):  ALBUTerol    90 MICROgram(s) HFA Inhaler 1 Puff(s) Inhalation every 4 hours  amLODIPine   Tablet 10 milliGRAM(s) Oral daily  anakinra Injectable 100 milliGRAM(s) SubCutaneous daily  carvedilol 12.5 milliGRAM(s) Oral every 12 hours  dextrose 5%. 1000 milliLiter(s) (50 mL/Hr) IV Continuous <Continuous>  dextrose 50% Injectable 12.5 Gram(s) IV Push once  dextrose 50% Injectable 25 Gram(s) IV Push once  dextrose 50% Injectable 25 Gram(s) IV Push once  heparin  Injectable 7500 Unit(s) SubCutaneous every 8 hours  influenza   Vaccine 0.5 milliLiter(s) IntraMuscular once  insulin glargine Injectable (LANTUS) 18 Unit(s) SubCutaneous at bedtime  insulin lispro (HumaLOG) corrective regimen sliding scale   SubCutaneous at bedtime  insulin lispro (HumaLOG) corrective regimen sliding scale   SubCutaneous three times a day before meals  insulin lispro Injectable (HumaLOG) 3 Unit(s) SubCutaneous three times a day before meals      TELEMETRY: 	    ECG:  	  RADIOLOGY:   DIAGNOSTIC TESTING:  [ ] Echocardiogram:  [ ]  Catheterization:  [ ] Stress Test:    OTHER: 	    LABS:	 	                            12.5   6.39  )-----------( 316      ( 10 Apr 2020 06:10 )             39.2     04-10    140  |  99  |  28<H>  ----------------------------<  108<H>  3.6   |  29  |  1.12    Ca    8.7      10 Apr 2020 06:10  Phos  3.4     04-10  Mg     2.1     04-10    TPro  6.2  /  Alb  2.8<L>  /  TBili  0.4  /  DBili  x   /  AST  31  /  ALT  48<H>  /  AlkPhos  38<L>  04-10

## 2020-04-10 NOTE — PROGRESS NOTE ADULT - SUBJECTIVE AND OBJECTIVE BOX
Medicine Progress Note    ***************************************************************  CONTACT INFO  Juan José Cardoso M.D., PGY-2  Pager: [phone] ([facility]) / [phone] ([facility])    ***************************************************************    Patient is a 59y old  Male who presents with a chief complaint of hypoxic resp failure (09 Apr 2020 14:28)      SUBJECTIVE / OVERNIGHT EVENTS: Patient seen and examined at bedside. Vital signs stable overnight. Patient denies headache, dizziness, chest/abd pain, shortness of breath. ROS negative unless otherwise stated. Currently on 1-2L of NC.    ADDITIONAL REVIEW OF SYSTEMS:    MEDICATIONS  (STANDING):  ALBUTerol    90 MICROgram(s) HFA Inhaler 1 Puff(s) Inhalation every 4 hours  amLODIPine   Tablet 10 milliGRAM(s) Oral daily  anakinra Injectable 100 milliGRAM(s) SubCutaneous daily  carvedilol 12.5 milliGRAM(s) Oral every 12 hours  dextrose 5%. 1000 milliLiter(s) (50 mL/Hr) IV Continuous <Continuous>  dextrose 50% Injectable 12.5 Gram(s) IV Push once  dextrose 50% Injectable 25 Gram(s) IV Push once  dextrose 50% Injectable 25 Gram(s) IV Push once  heparin  Injectable 7500 Unit(s) SubCutaneous every 8 hours  influenza   Vaccine 0.5 milliLiter(s) IntraMuscular once  insulin glargine Injectable (LANTUS) 18 Unit(s) SubCutaneous at bedtime  insulin lispro (HumaLOG) corrective regimen sliding scale   SubCutaneous at bedtime  insulin lispro (HumaLOG) corrective regimen sliding scale   SubCutaneous three times a day before meals  insulin lispro Injectable (HumaLOG) 3 Unit(s) SubCutaneous three times a day before meals    MEDICATIONS  (PRN):  acetaminophen   Tablet .. 650 milliGRAM(s) Oral every 4 hours PRN Temp greater or equal to 38.5C (101.3F)  dextrose 40% Gel 15 Gram(s) Oral once PRN Blood Glucose LESS THAN 70 milliGRAM(s)/deciliter  glucagon  Injectable 1 milliGRAM(s) IntraMuscular once PRN Glucose LESS THAN 70 milligrams/deciliter    CAPILLARY BLOOD GLUCOSE      POCT Blood Glucose.: 239 mg/dL (09 Apr 2020 21:16)  POCT Blood Glucose.: 167 mg/dL (09 Apr 2020 16:59)  POCT Blood Glucose.: 152 mg/dL (09 Apr 2020 11:52)  POCT Blood Glucose.: 142 mg/dL (09 Apr 2020 07:43)    I&O's Summary      PHYSICAL EXAM:  Vital Signs Last 24 Hrs  T(C): 36.9 (10 Apr 2020 05:20), Max: 36.9 (09 Apr 2020 12:27)  T(F): 98.5 (10 Apr 2020 05:20), Max: 98.5 (10 Apr 2020 05:20)  HR: 76 (10 Apr 2020 05:20) (72 - 86)  BP: 150/95 (10 Apr 2020 05:20) (145/90 - 150/95)  BP(mean): --  RR: 17 (10 Apr 2020 05:20) (17 - 18)  SpO2: 97% (10 Apr 2020 05:20) (92% - 99%)    CONSTITUTIONAL: NAD, well-developed, well-groomed  ENMT: Moist oral mucosa, no pharyngeal injection or exudates; normal dentition  RESPIRATORY: Normal respiratory effort; lungs are clear to auscultation bilaterally  CARDIOVASCULAR: Regular rate and rhythm, normal S1 and S2, no murmur/rub/gallop; No lower extremity edema; Peripheral pulses are 2+ bilaterally  ABDOMEN: Nontender to palpation, normoactive bowel sounds, no rebound/guarding; No hepatosplenomegaly  PSYCH: A+O to person, place, and time; affect appropriate  NEUROLOGY: CN 2-12 are intact and symmetric; no gross sensory deficits   SKIN: No rashes; no palpable lesions    LABS:                        12.4   6.60  )-----------( 318      ( 09 Apr 2020 05:40 )             39.0     04-09    138  |  97<L>  |  36<H>  ----------------------------<  148<H>  3.7   |  29  |  1.23    Ca    8.9      09 Apr 2020 05:40  Phos  4.2     04-09  Mg     2.4     04-09    TPro  6.5  /  Alb  2.8<L>  /  TBili  0.6  /  DBili  x   /  AST  28  /  ALT  41  /  AlkPhos  42  04-09              COVID-19 PCR: Detected (31 Mar 2020 02:32)      RADIOLOGY & ADDITIONAL TESTS:  Imaging from Last 24 Hours:    Electrocardiogram/QTc Interval:    COORDINATION OF CARE:  Care Discussed with Consultants/Other Providers:

## 2020-04-10 NOTE — PROGRESS NOTE ADULT - SUBJECTIVE AND OBJECTIVE BOX
Patient is a 59y old  Male who presents with a chief complaint of hypoxic resp failure (09 Apr 2020 14:28)      Any change in ROS:  doingok : on 1-2     MEDICATIONS  (STANDING):  ALBUTerol    90 MICROgram(s) HFA Inhaler 1 Puff(s) Inhalation every 4 hours  amLODIPine   Tablet 10 milliGRAM(s) Oral daily  carvedilol 12.5 milliGRAM(s) Oral every 12 hours  dextrose 5%. 1000 milliLiter(s) (50 mL/Hr) IV Continuous <Continuous>  dextrose 50% Injectable 12.5 Gram(s) IV Push once  dextrose 50% Injectable 25 Gram(s) IV Push once  dextrose 50% Injectable 25 Gram(s) IV Push once  heparin  Injectable 7500 Unit(s) SubCutaneous every 8 hours  influenza   Vaccine 0.5 milliLiter(s) IntraMuscular once  insulin glargine Injectable (LANTUS) 18 Unit(s) SubCutaneous at bedtime  insulin lispro (HumaLOG) corrective regimen sliding scale   SubCutaneous at bedtime  insulin lispro (HumaLOG) corrective regimen sliding scale   SubCutaneous three times a day before meals  insulin lispro Injectable (HumaLOG) 3 Unit(s) SubCutaneous three times a day before meals    MEDICATIONS  (PRN):  acetaminophen   Tablet .. 650 milliGRAM(s) Oral every 4 hours PRN Temp greater or equal to 38.5C (101.3F)  dextrose 40% Gel 15 Gram(s) Oral once PRN Blood Glucose LESS THAN 70 milliGRAM(s)/deciliter  glucagon  Injectable 1 milliGRAM(s) IntraMuscular once PRN Glucose LESS THAN 70 milligrams/deciliter    Vital Signs Last 24 Hrs  T(C): 37.1 (10 Apr 2020 13:00), Max: 37.1 (10 Apr 2020 13:00)  T(F): 98.7 (10 Apr 2020 13:00), Max: 98.7 (10 Apr 2020 13:00)  HR: 76 (10 Apr 2020 13:00) (72 - 86)  BP: 122/77 (10 Apr 2020 13:00) (122/77 - 150/95)  BP(mean): --  RR: 17 (10 Apr 2020 13:00) (17 - 19)  SpO2: 96% (10 Apr 2020 13:00) (92% - 99%)    I&O's Summary        Physical Exam:   GENERAL: NAD, well-groomed, well-developed  HEENT: JILL/   Atraumatic, Normocephalic  ENMT: No tonsillar erythema, exudates, or enlargement; Moist mucous membranes, Good dentition, No lesions  NECK: Supple, No JVD, Normal thyroid  CHEST/LUNG: Clear to auscultaion, ; No rales, rhonchi, wheezing, or rubs  CVS: Regular rate and rhythm; No murmurs, rubs, or gallops  GI: : Soft, Nontender, Nondistended; Bowel sounds present  NERVOUS SYSTEM:  Alert & Oriented X3  EXTREMITIES:  2+ Peripheral Pulses, No clubbing, cyanosis, or edema  LYMPH: No lymphadenopathy noted  SKIN: No rashes or lesions  ENDOCRINOLOGY: No Thyromegaly  PSYCH: Appropriate    Labs:                              12.5   6.39  )-----------( 316      ( 10 Apr 2020 06:10 )             39.2                         12.4   6.60  )-----------( 318      ( 09 Apr 2020 05:40 )             39.0                         12.7   8.28  )-----------( 303      ( 08 Apr 2020 05:31 )             39.0                         13.2   9.03  )-----------( 319      ( 07 Apr 2020 05:21 )             40.6     04-10    140  |  99  |  28<H>  ----------------------------<  108<H>  3.6   |  29  |  1.12  04-09    138  |  97<L>  |  36<H>  ----------------------------<  148<H>  3.7   |  29  |  1.23  04-08    134<L>  |  95<L>  |  38<H>  ----------------------------<  230<H>  3.5   |  30  |  1.31<H>  04-07    135  |  94<L>  |  33<H>  ----------------------------<  200<H>  3.9   |  28  |  1.16    Ca    8.7      10 Apr 2020 06:10  Ca    8.9      09 Apr 2020 05:40  Phos  3.4     04-10  Phos  4.2     04-09  Mg     2.1     04-10  Mg     2.4     04-09    TPro  6.2  /  Alb  2.8<L>  /  TBili  0.4  /  DBili  x   /  AST  31  /  ALT  48<H>  /  AlkPhos  38<L>  04-10  TPro  6.5  /  Alb  2.8<L>  /  TBili  0.6  /  DBili  x   /  AST  28  /  ALT  41  /  AlkPhos  42  04-09  TPro  6.3  /  Alb  2.7<L>  /  TBili  0.5  /  DBili  x   /  AST  23  /  ALT  32  /  AlkPhos  42  04-08  TPro  6.7  /  Alb  2.8<L>  /  TBili  0.6  /  DBili  x   /  AST  32  /  ALT  33  /  AlkPhos  46  04-07    CAPILLARY BLOOD GLUCOSE      POCT Blood Glucose.: 116 mg/dL (10 Apr 2020 11:47)  POCT Blood Glucose.: 97 mg/dL (10 Apr 2020 07:40)  POCT Blood Glucose.: 239 mg/dL (09 Apr 2020 21:16)  POCT Blood Glucose.: 167 mg/dL (09 Apr 2020 16:59)      LIVER FUNCTIONS - ( 10 Apr 2020 06:10 )  Alb: 2.8 g/dL / Pro: 6.2 g/dL / ALK PHOS: 38 u/L / ALT: 48 u/L / AST: 31 u/L / GGT: x               D-Dimer Assay, Quantitative: 3054 ng/mL (04-10 @ 06:10)  D-Dimer Assay, Quantitative: 3153 ng/mL (04-09 @ 05:40)  D-Dimer Assay, Quantitative: 3911 ng/mL (04-05 @ 15:38)  D-Dimer Assay, Quantitative: 3143 ng/mL (04-04 @ 06:30)  Procalcitonin, Serum: 0.16 ng/mL (04-09 @ 05:40)  Procalcitonin, Serum: Test not performed QNS - QUANTITY NOT SUFFICIENT. ng/mL (04-07 @ 05:21)        RECENT CULTURES:      < from: Xray Chest 1 View- PORTABLE-Urgent (03.31.20 @ 02:54) >  EXAM:  XR CHEST PORTABLE URGENT 1V        PROCEDURE DATE:  Mar 31 2020         INTERPRETATION:  CLINICAL INFORMATION: Fever and shortness of breath.    EXAM: 1 view of the chest.    COMPARISON: None.    FINDINGS:    Low lung volumes limiting evaluation. Patchy bilateral airspace opacities. No pneumothorax. The heart is enlarged.     IMPRESSION: Limited study but suspicion for opacities consistent with covid 19 infection.                CANDI NASH M.D., RADIOLOGY RESIDENT  This documenthas been electronically signed.  ANDREZ VERNON M.D., ATTENDING RADIOLOGIST  This document has been electronically signed. Mar 31 2020  7:09AM              < end of copied text >    RESPIRATORY CULTURES:          Studies  Chest X-RAY  CT SCAN Chest   Venous Dopplers: LE:   CT Abdomen  Others

## 2020-04-10 NOTE — PROGRESS NOTE ADULT - ASSESSMENT
59 y /o Male  with PMH of HTN, HLD, and DM p/w fever and fatigue x 1 week found to be COVID + as of 3/31/20.     1. Acute hypoxic respiratory failure 2/2 to COVID infection  -COVID + as of 3/31/20.  -supplemental O2 as needed  -s/p abx, IV steroids   -on anakinra  -pulm f/u , ID f/u, med f/u     2. HTN  -bp mildly elevated, can incerase coreg to 25 mg BID       dvt ppx

## 2020-04-11 LAB
ALBUMIN SERPL ELPH-MCNC: 3 G/DL — LOW (ref 3.3–5)
ALP SERPL-CCNC: 42 U/L — SIGNIFICANT CHANGE UP (ref 40–120)
ALT FLD-CCNC: 60 U/L — HIGH (ref 4–41)
ANION GAP SERPL CALC-SCNC: 15 MMO/L — HIGH (ref 7–14)
AST SERPL-CCNC: 45 U/L — HIGH (ref 4–40)
BASOPHILS # BLD AUTO: 0.02 K/UL — SIGNIFICANT CHANGE UP (ref 0–0.2)
BASOPHILS NFR BLD AUTO: 0.4 % — SIGNIFICANT CHANGE UP (ref 0–2)
BILIRUB SERPL-MCNC: 0.4 MG/DL — SIGNIFICANT CHANGE UP (ref 0.2–1.2)
BUN SERPL-MCNC: 24 MG/DL — HIGH (ref 7–23)
CALCIUM SERPL-MCNC: 9 MG/DL — SIGNIFICANT CHANGE UP (ref 8.4–10.5)
CHLORIDE SERPL-SCNC: 99 MMOL/L — SIGNIFICANT CHANGE UP (ref 98–107)
CO2 SERPL-SCNC: 21 MMOL/L — LOW (ref 22–31)
CREAT SERPL-MCNC: 1.02 MG/DL — SIGNIFICANT CHANGE UP (ref 0.5–1.3)
EOSINOPHIL # BLD AUTO: 0.1 K/UL — SIGNIFICANT CHANGE UP (ref 0–0.5)
EOSINOPHIL NFR BLD AUTO: 2.1 % — SIGNIFICANT CHANGE UP (ref 0–6)
GLUCOSE BLDC GLUCOMTR-MCNC: 122 MG/DL — HIGH (ref 70–99)
GLUCOSE BLDC GLUCOMTR-MCNC: 142 MG/DL — HIGH (ref 70–99)
GLUCOSE BLDC GLUCOMTR-MCNC: 191 MG/DL — HIGH (ref 70–99)
GLUCOSE BLDC GLUCOMTR-MCNC: 220 MG/DL — HIGH (ref 70–99)
GLUCOSE SERPL-MCNC: 134 MG/DL — HIGH (ref 70–99)
HCT VFR BLD CALC: 43 % — SIGNIFICANT CHANGE UP (ref 39–50)
HGB BLD-MCNC: 13.5 G/DL — SIGNIFICANT CHANGE UP (ref 13–17)
IMM GRANULOCYTES NFR BLD AUTO: 1.3 % — SIGNIFICANT CHANGE UP (ref 0–1.5)
LYMPHOCYTES # BLD AUTO: 0.94 K/UL — LOW (ref 1–3.3)
LYMPHOCYTES # BLD AUTO: 20 % — SIGNIFICANT CHANGE UP (ref 13–44)
MAGNESIUM SERPL-MCNC: 2.2 MG/DL — SIGNIFICANT CHANGE UP (ref 1.6–2.6)
MCHC RBC-ENTMCNC: 27.1 PG — SIGNIFICANT CHANGE UP (ref 27–34)
MCHC RBC-ENTMCNC: 31.4 % — LOW (ref 32–36)
MCV RBC AUTO: 86.2 FL — SIGNIFICANT CHANGE UP (ref 80–100)
MONOCYTES # BLD AUTO: 0.64 K/UL — SIGNIFICANT CHANGE UP (ref 0–0.9)
MONOCYTES NFR BLD AUTO: 13.6 % — SIGNIFICANT CHANGE UP (ref 2–14)
NEUTROPHILS # BLD AUTO: 2.93 K/UL — SIGNIFICANT CHANGE UP (ref 1.8–7.4)
NEUTROPHILS NFR BLD AUTO: 62.6 % — SIGNIFICANT CHANGE UP (ref 43–77)
NRBC # FLD: 0 K/UL — SIGNIFICANT CHANGE UP (ref 0–0)
PHOSPHATE SERPL-MCNC: 3.2 MG/DL — SIGNIFICANT CHANGE UP (ref 2.5–4.5)
PLATELET # BLD AUTO: 297 K/UL — SIGNIFICANT CHANGE UP (ref 150–400)
PMV BLD: 11 FL — SIGNIFICANT CHANGE UP (ref 7–13)
POTASSIUM SERPL-MCNC: 4.8 MMOL/L — SIGNIFICANT CHANGE UP (ref 3.5–5.3)
POTASSIUM SERPL-SCNC: 4.8 MMOL/L — SIGNIFICANT CHANGE UP (ref 3.5–5.3)
PROT SERPL-MCNC: 6.6 G/DL — SIGNIFICANT CHANGE UP (ref 6–8.3)
RBC # BLD: 4.99 M/UL — SIGNIFICANT CHANGE UP (ref 4.2–5.8)
RBC # FLD: 14.7 % — HIGH (ref 10.3–14.5)
SODIUM SERPL-SCNC: 135 MMOL/L — SIGNIFICANT CHANGE UP (ref 135–145)
WBC # BLD: 4.69 K/UL — SIGNIFICANT CHANGE UP (ref 3.8–10.5)
WBC # FLD AUTO: 4.69 K/UL — SIGNIFICANT CHANGE UP (ref 3.8–10.5)

## 2020-04-11 RX ADMIN — HEPARIN SODIUM 7500 UNIT(S): 5000 INJECTION INTRAVENOUS; SUBCUTANEOUS at 12:24

## 2020-04-11 RX ADMIN — Medication 4: at 12:24

## 2020-04-11 RX ADMIN — CARVEDILOL PHOSPHATE 12.5 MILLIGRAM(S): 80 CAPSULE, EXTENDED RELEASE ORAL at 17:15

## 2020-04-11 RX ADMIN — AMLODIPINE BESYLATE 10 MILLIGRAM(S): 2.5 TABLET ORAL at 05:56

## 2020-04-11 RX ADMIN — HEPARIN SODIUM 7500 UNIT(S): 5000 INJECTION INTRAVENOUS; SUBCUTANEOUS at 21:27

## 2020-04-11 RX ADMIN — Medication 3 UNIT(S): at 17:15

## 2020-04-11 RX ADMIN — CARVEDILOL PHOSPHATE 12.5 MILLIGRAM(S): 80 CAPSULE, EXTENDED RELEASE ORAL at 05:56

## 2020-04-11 RX ADMIN — INSULIN GLARGINE 18 UNIT(S): 100 INJECTION, SOLUTION SUBCUTANEOUS at 21:27

## 2020-04-11 RX ADMIN — Medication 3 UNIT(S): at 08:20

## 2020-04-11 RX ADMIN — Medication 3 UNIT(S): at 12:24

## 2020-04-11 RX ADMIN — HEPARIN SODIUM 7500 UNIT(S): 5000 INJECTION INTRAVENOUS; SUBCUTANEOUS at 05:57

## 2020-04-11 NOTE — PROGRESS NOTE ADULT - ASSESSMENT
59 y /o Male  with PMH of HTN, HLD, and DM p/w fever and fatigue x 1 week found to be COVID + as of 3/31/20.     1. Acute hypoxic respiratory failure 2/2 to COVID infection  -COVID + as of 3/31/20.  -supplemental O2 as needed  -s/p abx, IV steroids , anakinra  -pulm f/u , ID f/u, med f/u   -inflammatory markers trending down     2. HTN  -bp mildly elevated, can increase coreg to 25 mg BID       dvt ppx

## 2020-04-11 NOTE — PROGRESS NOTE ADULT - ASSESSMENT
pt w/ symptoms c/w covid    COVID PNEUMONIA    on 13 L of nasal cannula  D5 of anakinra: bid dosing:  finished steroids as well as Plaquenil  D D WENDY: CRP AND FERRITIN ALL HIGH:  FOLLOW UP IN AM   DVT PROPAHYXLIS     4/6:  much better : 11 100% sao2  d6 of anakinra bid dosing: crp has come down as well as ferritin: d dimer is still high   dvt prophylaxis  cont to titrate down fio2    4/7: doing much better:  on 8 l : o 2 sao2 100%:   now anakinra on one a day dosing: stop after three days:  dvt prophylaxis     4/8:    on 6 L of oxygen now: doing a little better:   on anakinra q daily dosages:   todays is in [place]: would defer to primary team for management  dvt propahyxlisx:  his oxygenation seems t o be better but now heis in [place]:   FOLLOW UP FERRITIN, D DIMER: AND CRP IN AM     4/9:  on 4 l of oxygen  Ankinra will finish tomorrow  jace perpri may team   d dimer is higgh but has not increased from before:  dvt propahylaxis  dw team     4/10:  suzy ok : no SOB : no cugh :  nasal cannula: 23?l  per minute:   finished rx:   on low dose nasal cannula   per primary team     4/11:    doing better:   on 1 L of oxygen    inflammatory markers down   covd rx finsihed    DM  HTN

## 2020-04-11 NOTE — PROGRESS NOTE ADULT - SUBJECTIVE AND OBJECTIVE BOX
Patient is a 59y old  Male who presents with a chief complaint of hypoxic resp failure (09 Apr 2020 14:28)      Any change in ROS: on 1 l ofoxygen     MEDICATIONS  (STANDING):  ALBUTerol    90 MICROgram(s) HFA Inhaler 1 Puff(s) Inhalation every 4 hours  amLODIPine   Tablet 10 milliGRAM(s) Oral daily  carvedilol 12.5 milliGRAM(s) Oral every 12 hours  dextrose 5%. 1000 milliLiter(s) (50 mL/Hr) IV Continuous <Continuous>  dextrose 50% Injectable 12.5 Gram(s) IV Push once  dextrose 50% Injectable 25 Gram(s) IV Push once  dextrose 50% Injectable 25 Gram(s) IV Push once  heparin  Injectable 7500 Unit(s) SubCutaneous every 8 hours  influenza   Vaccine 0.5 milliLiter(s) IntraMuscular once  insulin glargine Injectable (LANTUS) 18 Unit(s) SubCutaneous at bedtime  insulin lispro (HumaLOG) corrective regimen sliding scale   SubCutaneous at bedtime  insulin lispro (HumaLOG) corrective regimen sliding scale   SubCutaneous three times a day before meals  insulin lispro Injectable (HumaLOG) 3 Unit(s) SubCutaneous three times a day before meals    MEDICATIONS  (PRN):  acetaminophen   Tablet .. 650 milliGRAM(s) Oral every 4 hours PRN Temp greater or equal to 38.5C (101.3F)  dextrose 40% Gel 15 Gram(s) Oral once PRN Blood Glucose LESS THAN 70 milliGRAM(s)/deciliter  glucagon  Injectable 1 milliGRAM(s) IntraMuscular once PRN Glucose LESS THAN 70 milligrams/deciliter    Vital Signs Last 24 Hrs  T(C): 37.1 (11 Apr 2020 12:37), Max: 37.1 (10 Apr 2020 13:00)  T(F): 98.8 (11 Apr 2020 12:37), Max: 98.8 (11 Apr 2020 12:37)  HR: 63 (11 Apr 2020 12:37) (63 - 76)  BP: 144/96 (11 Apr 2020 12:37) (122/77 - 152/81)  BP(mean): --  RR: 20 (11 Apr 2020 12:37) (17 - 20)  SpO2: 96% (11 Apr 2020 12:37) (68% - 99%)    I&O's Summary    10 Apr 2020 07:01  -  11 Apr 2020 07:00  --------------------------------------------------------  IN: 0 mL / OUT: 475 mL / NET: -475 mL          Physical Exam:   GENERAL: NAD, well-groomed, well-developed  HEENT: JILL/   Atraumatic, Normocephalic  ENMT: No tonsillar erythema, exudates, or enlargement; Moist mucous membranes, Good dentition, No lesions  NECK: Supple, No JVD, Normal thyroid  CHEST/LUNG: Clear to auscultaion, ; No rales, rhonchi, wheezing, or rubs  CVS: Regular rate and rhythm; No murmurs, rubs, or gallops  GI: : Soft, Nontender, Nondistended; Bowel sounds present  NERVOUS SYSTEM:  Alert & Oriented X3, Good concentration; Motor Strength 5/5 B/L upper and lower extremities; DTRs 2+ intact and symmetric  EXTREMITIES:  2+ Peripheral Pulses, No clubbing, cyanosis, or edema  LYMPH: No lymphadenopathy noted  SKIN: No rashes or lesions  ENDOCRINOLOGY: No Thyromegaly  PSYCH: Appropriate    Labs:                              13.5   4.69  )-----------( 297      ( 11 Apr 2020 04:30 )             43.0                         12.5   6.39  )-----------( 316      ( 10 Apr 2020 06:10 )             39.2                         12.4   6.60  )-----------( 318      ( 09 Apr 2020 05:40 )             39.0                         12.7   8.28  )-----------( 303      ( 08 Apr 2020 05:31 )             39.0     04-11    135  |  99  |  24<H>  ----------------------------<  134<H>  4.8   |  21<L>  |  1.02  04-10    140  |  99  |  28<H>  ----------------------------<  108<H>  3.6   |  29  |  1.12  04-09    138  |  97<L>  |  36<H>  ----------------------------<  148<H>  3.7   |  29  |  1.23  04-08    134<L>  |  95<L>  |  38<H>  ----------------------------<  230<H>  3.5   |  30  |  1.31<H>    Ca    9.0      11 Apr 2020 04:30  Ca    8.7      10 Apr 2020 06:10  Phos  3.2     04-11  Phos  3.4     04-10  Mg     2.2     04-11  Mg     2.1     04-10    TPro  6.6  /  Alb  3.0<L>  /  TBili  0.4  /  DBili  x   /  AST  45<H>  /  ALT  60<H>  /  AlkPhos  42  04-11  TPro  6.2  /  Alb  2.8<L>  /  TBili  0.4  /  DBili  x   /  AST  31  /  ALT  48<H>  /  AlkPhos  38<L>  04-10  TPro  6.5  /  Alb  2.8<L>  /  TBili  0.6  /  DBili  x   /  AST  28  /  ALT  41  /  AlkPhos  42  04-09  TPro  6.3  /  Alb  2.7<L>  /  TBili  0.5  /  DBili  x   /  AST  23  /  ALT  32  /  AlkPhos  42  04-08    CAPILLARY BLOOD GLUCOSE      POCT Blood Glucose.: 220 mg/dL (11 Apr 2020 12:06)  POCT Blood Glucose.: 122 mg/dL (11 Apr 2020 07:27)  POCT Blood Glucose.: 251 mg/dL (10 Apr 2020 21:47)  POCT Blood Glucose.: 136 mg/dL (10 Apr 2020 17:09)      LIVER FUNCTIONS - ( 11 Apr 2020 04:30 )  Alb: 3.0 g/dL / Pro: 6.6 g/dL / ALK PHOS: 42 u/L / ALT: 60 u/L / AST: 45 u/L / GGT: x               D-Dimer Assay, Quantitative: 3054 ng/mL (04-10 @ 06:10)  D-Dimer Assay, Quantitative: 3153 ng/mL (04-09 @ 05:40)  D-Dimer Assay, Quantitative: 3911 ng/mL (04-05 @ 15:38)  Procalcitonin, Serum: 0.16 ng/mL (04-09 @ 05:40)        RECENT CULTURES:        RESPIRATORY CULTURES:          Studies  Chest X-RAY  CT SCAN Chest   Venous Dopplers: LE:   CT Abdomen  Others

## 2020-04-11 NOTE — PROGRESS NOTE ADULT - PROBLEM SELECTOR PLAN 1
c/w anakinra to end on 4/10  c/w supportive care  titrate off O2 as tolerated. As per RN, patient Spo2>92% on ambulation however when sat back down, desat to high 70s and placed back on NC.   Inflammatory markers including CRP, ferritin, and D-dimer are downtrending. Goal to watch off o2 today and ambulate. Minimal o2 requirements currently on 1L  s/p anakinra   c/w supportive care  titrate off O2 as tolerated.   Inflammatory markers including CRP, ferritin, and D-dimer are downtrending.

## 2020-04-11 NOTE — PROGRESS NOTE ADULT - SUBJECTIVE AND OBJECTIVE BOX
CARDIOLOGY FOLLOW UP - Dr. Piña    CC no acute events  on nasal canula 1L Spo2>92%        PHYSICAL EXAM:  T(C): 36.4 (04-11-20 @ 05:30), Max: 37.1 (04-10-20 @ 13:00)  HR: 64 (04-11-20 @ 05:30) (64 - 76)  BP: 152/81 (04-11-20 @ 05:30) (122/77 - 152/81)  RR: 18 (04-11-20 @ 06:03) (17 - 18)  SpO2: 97% (04-11-20 @ 06:03) (68% - 99%)  Wt(kg): --  I&O's Summary    10 Apr 2020 07:01  -  11 Apr 2020 07:00  --------------------------------------------------------  IN: 0 mL / OUT: 475 mL / NET: -475 mL          MEDICATIONS  (STANDING):  ALBUTerol    90 MICROgram(s) HFA Inhaler 1 Puff(s) Inhalation every 4 hours  amLODIPine   Tablet 10 milliGRAM(s) Oral daily  carvedilol 12.5 milliGRAM(s) Oral every 12 hours  dextrose 5%. 1000 milliLiter(s) (50 mL/Hr) IV Continuous <Continuous>  dextrose 50% Injectable 12.5 Gram(s) IV Push once  dextrose 50% Injectable 25 Gram(s) IV Push once  dextrose 50% Injectable 25 Gram(s) IV Push once  heparin  Injectable 7500 Unit(s) SubCutaneous every 8 hours  influenza   Vaccine 0.5 milliLiter(s) IntraMuscular once  insulin glargine Injectable (LANTUS) 18 Unit(s) SubCutaneous at bedtime  insulin lispro (HumaLOG) corrective regimen sliding scale   SubCutaneous at bedtime  insulin lispro (HumaLOG) corrective regimen sliding scale   SubCutaneous three times a day before meals  insulin lispro Injectable (HumaLOG) 3 Unit(s) SubCutaneous three times a day before meals      TELEMETRY: 	    ECG:  	  RADIOLOGY:   DIAGNOSTIC TESTING:  [ ] Echocardiogram:  [ ]  Catheterization:  [ ] Stress Test:    OTHER: 	    LABS:	 	                                13.5   4.69  )-----------( 297      ( 11 Apr 2020 04:30 )             43.0     04-11    135  |  99  |  24<H>  ----------------------------<  134<H>  4.8   |  21<L>  |  1.02    Ca    9.0      11 Apr 2020 04:30  Phos  3.2     04-11  Mg     2.2     04-11    TPro  6.6  /  Alb  3.0<L>  /  TBili  0.4  /  DBili  x   /  AST  45<H>  /  ALT  60<H>  /  AlkPhos  42  04-11

## 2020-04-11 NOTE — PROGRESS NOTE ADULT - SUBJECTIVE AND OBJECTIVE BOX
Medicine Progress Note    ***************************************************************  CONTACT INFO  Juan José Cardoso M.D., PGY-2  Pager: [phone] ([facility]) / [phone] ([facility])    ***************************************************************    Patient is a 59y old  Male who presents with a chief complaint of hypoxic resp failure (09 Apr 2020 14:28)      SUBJECTIVE / OVERNIGHT EVENTS: Patient seen and examined at bedside. Vital signs stable overnight. Remains on 1L NC with Spo2>92%  Patient denies headache, dizziness, chest/abd pain, shortness of breath. ROS negative unless otherwise stated.     ADDITIONAL REVIEW OF SYSTEMS:    MEDICATIONS  (STANDING):  ALBUTerol    90 MICROgram(s) HFA Inhaler 1 Puff(s) Inhalation every 4 hours  amLODIPine   Tablet 10 milliGRAM(s) Oral daily  carvedilol 12.5 milliGRAM(s) Oral every 12 hours  dextrose 5%. 1000 milliLiter(s) (50 mL/Hr) IV Continuous <Continuous>  dextrose 50% Injectable 12.5 Gram(s) IV Push once  dextrose 50% Injectable 25 Gram(s) IV Push once  dextrose 50% Injectable 25 Gram(s) IV Push once  heparin  Injectable 7500 Unit(s) SubCutaneous every 8 hours  influenza   Vaccine 0.5 milliLiter(s) IntraMuscular once  insulin glargine Injectable (LANTUS) 18 Unit(s) SubCutaneous at bedtime  insulin lispro (HumaLOG) corrective regimen sliding scale   SubCutaneous at bedtime  insulin lispro (HumaLOG) corrective regimen sliding scale   SubCutaneous three times a day before meals  insulin lispro Injectable (HumaLOG) 3 Unit(s) SubCutaneous three times a day before meals    MEDICATIONS  (PRN):  acetaminophen   Tablet .. 650 milliGRAM(s) Oral every 4 hours PRN Temp greater or equal to 38.5C (101.3F)  dextrose 40% Gel 15 Gram(s) Oral once PRN Blood Glucose LESS THAN 70 milliGRAM(s)/deciliter  glucagon  Injectable 1 milliGRAM(s) IntraMuscular once PRN Glucose LESS THAN 70 milligrams/deciliter    CAPILLARY BLOOD GLUCOSE      POCT Blood Glucose.: 251 mg/dL (10 Apr 2020 21:47)  POCT Blood Glucose.: 136 mg/dL (10 Apr 2020 17:09)  POCT Blood Glucose.: 116 mg/dL (10 Apr 2020 11:47)  POCT Blood Glucose.: 97 mg/dL (10 Apr 2020 07:40)    I&O's Summary    10 Apr 2020 07:01  -  11 Apr 2020 07:00  --------------------------------------------------------  IN: 0 mL / OUT: 475 mL / NET: -475 mL        PHYSICAL EXAM:  Vital Signs Last 24 Hrs  T(C): 36.4 (11 Apr 2020 05:30), Max: 37.1 (10 Apr 2020 13:00)  T(F): 97.6 (11 Apr 2020 05:30), Max: 98.7 (10 Apr 2020 13:00)  HR: 64 (11 Apr 2020 05:30) (64 - 76)  BP: 152/81 (11 Apr 2020 05:30) (122/77 - 152/81)  BP(mean): --  RR: 18 (11 Apr 2020 06:03) (17 - 19)  SpO2: 97% (11 Apr 2020 06:03) (68% - 99%)  CONSTITUTIONAL: NAD, well-developed, well-groomed  ENMT: Moist oral mucosa, no pharyngeal injection or exudates; normal dentition  RESPIRATORY: Normal respiratory effort; lungs are clear to auscultation bilaterally  CARDIOVASCULAR: Regular rate and rhythm, normal S1 and S2, no murmur/rub/gallop; No lower extremity edema; Peripheral pulses are 2+ bilaterally  ABDOMEN: Nontender to palpation, normoactive bowel sounds, no rebound/guarding; No hepatosplenomegaly  PSYCH: A+O to person, place, and time; affect appropriate  NEUROLOGY: CN 2-12 are intact and symmetric; no gross sensory deficits   SKIN: No rashes; no palpable lesions    LABS:                        12.5   6.39  )-----------( 316      ( 10 Apr 2020 06:10 )             39.2     04-10    140  |  99  |  28<H>  ----------------------------<  108<H>  3.6   |  29  |  1.12    Ca    8.7      10 Apr 2020 06:10  Phos  3.4     04-10  Mg     2.1     04-10    TPro  6.2  /  Alb  2.8<L>  /  TBili  0.4  /  DBili  x   /  AST  31  /  ALT  48<H>  /  AlkPhos  38<L>  04-10              COVID-19 PCR: Detected (31 Mar 2020 02:32)      RADIOLOGY & ADDITIONAL TESTS:  Imaging from Last 24 Hours:    Electrocardiogram/QTc Interval:    COORDINATION OF CARE:  Care Discussed with Consultants/Other Providers: Medicine Progress Note    ***************************************************************  CONTACT INFO  Juan José Cardoso M.D., PGY-2  Pager: [phone] ([facility]) / [phone] ([facility])    ***************************************************************    Patient is a 59y old  Male who presents with a chief complaint of hypoxic resp failure (09 Apr 2020 14:28)      SUBJECTIVE / OVERNIGHT EVENTS: Patient seen and examined at bedside. Vital signs stable overnight. Remains on 1L NC with Spo2>92%. Feels anxious about being off O2 and still feels uncomfortable.   Patient denies headache, dizziness, chest/abd pain, shortness of breath. ROS negative unless otherwise stated.     ADDITIONAL REVIEW OF SYSTEMS:    MEDICATIONS  (STANDING):  ALBUTerol    90 MICROgram(s) HFA Inhaler 1 Puff(s) Inhalation every 4 hours  amLODIPine   Tablet 10 milliGRAM(s) Oral daily  carvedilol 12.5 milliGRAM(s) Oral every 12 hours  dextrose 5%. 1000 milliLiter(s) (50 mL/Hr) IV Continuous <Continuous>  dextrose 50% Injectable 12.5 Gram(s) IV Push once  dextrose 50% Injectable 25 Gram(s) IV Push once  dextrose 50% Injectable 25 Gram(s) IV Push once  heparin  Injectable 7500 Unit(s) SubCutaneous every 8 hours  influenza   Vaccine 0.5 milliLiter(s) IntraMuscular once  insulin glargine Injectable (LANTUS) 18 Unit(s) SubCutaneous at bedtime  insulin lispro (HumaLOG) corrective regimen sliding scale   SubCutaneous at bedtime  insulin lispro (HumaLOG) corrective regimen sliding scale   SubCutaneous three times a day before meals  insulin lispro Injectable (HumaLOG) 3 Unit(s) SubCutaneous three times a day before meals    MEDICATIONS  (PRN):  acetaminophen   Tablet .. 650 milliGRAM(s) Oral every 4 hours PRN Temp greater or equal to 38.5C (101.3F)  dextrose 40% Gel 15 Gram(s) Oral once PRN Blood Glucose LESS THAN 70 milliGRAM(s)/deciliter  glucagon  Injectable 1 milliGRAM(s) IntraMuscular once PRN Glucose LESS THAN 70 milligrams/deciliter    CAPILLARY BLOOD GLUCOSE      POCT Blood Glucose.: 251 mg/dL (10 Apr 2020 21:47)  POCT Blood Glucose.: 136 mg/dL (10 Apr 2020 17:09)  POCT Blood Glucose.: 116 mg/dL (10 Apr 2020 11:47)  POCT Blood Glucose.: 97 mg/dL (10 Apr 2020 07:40)    I&O's Summary    10 Apr 2020 07:01  -  11 Apr 2020 07:00  --------------------------------------------------------  IN: 0 mL / OUT: 475 mL / NET: -475 mL        PHYSICAL EXAM:  Vital Signs Last 24 Hrs  T(C): 36.4 (11 Apr 2020 05:30), Max: 37.1 (10 Apr 2020 13:00)  T(F): 97.6 (11 Apr 2020 05:30), Max: 98.7 (10 Apr 2020 13:00)  HR: 64 (11 Apr 2020 05:30) (64 - 76)  BP: 152/81 (11 Apr 2020 05:30) (122/77 - 152/81)  BP(mean): --  RR: 18 (11 Apr 2020 06:03) (17 - 19)  SpO2: 97% (11 Apr 2020 06:03) (68% - 99%)  CONSTITUTIONAL: NAD, well-developed, well-groomed  ENMT: Moist oral mucosa, no pharyngeal injection or exudates; normal dentition  RESPIRATORY: Normal respiratory effort; lungs are clear to auscultation bilaterally  CARDIOVASCULAR: Regular rate and rhythm, normal S1 and S2, no murmur/rub/gallop; No lower extremity edema; Peripheral pulses are 2+ bilaterally  ABDOMEN: Nontender to palpation, normoactive bowel sounds, no rebound/guarding; No hepatosplenomegaly  PSYCH: A+O to person, place, and time; affect appropriate  NEUROLOGY: CN 2-12 are intact and symmetric; no gross sensory deficits   SKIN: No rashes; no palpable lesions    LABS:                        12.5   6.39  )-----------( 316      ( 10 Apr 2020 06:10 )             39.2     04-10    140  |  99  |  28<H>  ----------------------------<  108<H>  3.6   |  29  |  1.12    Ca    8.7      10 Apr 2020 06:10  Phos  3.4     04-10  Mg     2.1     04-10    TPro  6.2  /  Alb  2.8<L>  /  TBili  0.4  /  DBili  x   /  AST  31  /  ALT  48<H>  /  AlkPhos  38<L>  04-10              COVID-19 PCR: Detected (31 Mar 2020 02:32)      RADIOLOGY & ADDITIONAL TESTS:  Imaging from Last 24 Hours:    Electrocardiogram/QTc Interval:    COORDINATION OF CARE:  Care Discussed with Consultants/Other Providers:

## 2020-04-12 LAB
ALBUMIN SERPL ELPH-MCNC: 2.8 G/DL — LOW (ref 3.3–5)
ALP SERPL-CCNC: 38 U/L — LOW (ref 40–120)
ALT FLD-CCNC: 59 U/L — HIGH (ref 4–41)
ANION GAP SERPL CALC-SCNC: 12 MMO/L — SIGNIFICANT CHANGE UP (ref 7–14)
AST SERPL-CCNC: 32 U/L — SIGNIFICANT CHANGE UP (ref 4–40)
BASOPHILS # BLD AUTO: 0.02 K/UL — SIGNIFICANT CHANGE UP (ref 0–0.2)
BASOPHILS NFR BLD AUTO: 0.4 % — SIGNIFICANT CHANGE UP (ref 0–2)
BILIRUB SERPL-MCNC: 0.4 MG/DL — SIGNIFICANT CHANGE UP (ref 0.2–1.2)
BUN SERPL-MCNC: 24 MG/DL — HIGH (ref 7–23)
CALCIUM SERPL-MCNC: 9 MG/DL — SIGNIFICANT CHANGE UP (ref 8.4–10.5)
CHLORIDE SERPL-SCNC: 99 MMOL/L — SIGNIFICANT CHANGE UP (ref 98–107)
CO2 SERPL-SCNC: 26 MMOL/L — SIGNIFICANT CHANGE UP (ref 22–31)
CREAT SERPL-MCNC: 1.22 MG/DL — SIGNIFICANT CHANGE UP (ref 0.5–1.3)
CRP SERPL-MCNC: 4.1 MG/L — SIGNIFICANT CHANGE UP
EOSINOPHIL # BLD AUTO: 0.13 K/UL — SIGNIFICANT CHANGE UP (ref 0–0.5)
EOSINOPHIL NFR BLD AUTO: 2.4 % — SIGNIFICANT CHANGE UP (ref 0–6)
FERRITIN SERPL-MCNC: 409.6 NG/ML — HIGH (ref 30–400)
GLUCOSE BLDC GLUCOMTR-MCNC: 126 MG/DL — HIGH (ref 70–99)
GLUCOSE BLDC GLUCOMTR-MCNC: 150 MG/DL — HIGH (ref 70–99)
GLUCOSE BLDC GLUCOMTR-MCNC: 167 MG/DL — HIGH (ref 70–99)
GLUCOSE BLDC GLUCOMTR-MCNC: 217 MG/DL — HIGH (ref 70–99)
GLUCOSE SERPL-MCNC: 146 MG/DL — HIGH (ref 70–99)
HCT VFR BLD CALC: 42.8 % — SIGNIFICANT CHANGE UP (ref 39–50)
HGB BLD-MCNC: 13.6 G/DL — SIGNIFICANT CHANGE UP (ref 13–17)
IMM GRANULOCYTES NFR BLD AUTO: 0.9 % — SIGNIFICANT CHANGE UP (ref 0–1.5)
LYMPHOCYTES # BLD AUTO: 1.37 K/UL — SIGNIFICANT CHANGE UP (ref 1–3.3)
LYMPHOCYTES # BLD AUTO: 25 % — SIGNIFICANT CHANGE UP (ref 13–44)
MAGNESIUM SERPL-MCNC: 2 MG/DL — SIGNIFICANT CHANGE UP (ref 1.6–2.6)
MCHC RBC-ENTMCNC: 26.4 PG — LOW (ref 27–34)
MCHC RBC-ENTMCNC: 31.8 % — LOW (ref 32–36)
MCV RBC AUTO: 83.1 FL — SIGNIFICANT CHANGE UP (ref 80–100)
MONOCYTES # BLD AUTO: 0.65 K/UL — SIGNIFICANT CHANGE UP (ref 0–0.9)
MONOCYTES NFR BLD AUTO: 11.8 % — SIGNIFICANT CHANGE UP (ref 2–14)
NEUTROPHILS # BLD AUTO: 3.27 K/UL — SIGNIFICANT CHANGE UP (ref 1.8–7.4)
NEUTROPHILS NFR BLD AUTO: 59.5 % — SIGNIFICANT CHANGE UP (ref 43–77)
NRBC # FLD: 0 K/UL — SIGNIFICANT CHANGE UP (ref 0–0)
PHOSPHATE SERPL-MCNC: 3.3 MG/DL — SIGNIFICANT CHANGE UP (ref 2.5–4.5)
PLATELET # BLD AUTO: 329 K/UL — SIGNIFICANT CHANGE UP (ref 150–400)
PMV BLD: 10.7 FL — SIGNIFICANT CHANGE UP (ref 7–13)
POTASSIUM SERPL-MCNC: 4 MMOL/L — SIGNIFICANT CHANGE UP (ref 3.5–5.3)
POTASSIUM SERPL-SCNC: 4 MMOL/L — SIGNIFICANT CHANGE UP (ref 3.5–5.3)
PROCALCITONIN SERPL-MCNC: 0.08 NG/ML — SIGNIFICANT CHANGE UP (ref 0.02–0.1)
PROT SERPL-MCNC: 6.6 G/DL — SIGNIFICANT CHANGE UP (ref 6–8.3)
RBC # BLD: 5.15 M/UL — SIGNIFICANT CHANGE UP (ref 4.2–5.8)
RBC # FLD: 14.5 % — SIGNIFICANT CHANGE UP (ref 10.3–14.5)
SODIUM SERPL-SCNC: 137 MMOL/L — SIGNIFICANT CHANGE UP (ref 135–145)
WBC # BLD: 5.49 K/UL — SIGNIFICANT CHANGE UP (ref 3.8–10.5)
WBC # FLD AUTO: 5.49 K/UL — SIGNIFICANT CHANGE UP (ref 3.8–10.5)

## 2020-04-12 RX ORDER — HYDRALAZINE HCL 50 MG
5 TABLET ORAL ONCE
Refills: 0 | Status: COMPLETED | OUTPATIENT
Start: 2020-04-12 | End: 2020-04-12

## 2020-04-12 RX ORDER — LOSARTAN POTASSIUM 100 MG/1
25 TABLET, FILM COATED ORAL DAILY
Refills: 0 | Status: DISCONTINUED | OUTPATIENT
Start: 2020-04-12 | End: 2020-04-15

## 2020-04-12 RX ORDER — HYDRALAZINE HCL 50 MG
10 TABLET ORAL ONCE
Refills: 0 | Status: DISCONTINUED | OUTPATIENT
Start: 2020-04-12 | End: 2020-04-12

## 2020-04-12 RX ORDER — HYDRALAZINE HCL 50 MG
10 TABLET ORAL ONCE
Refills: 0 | Status: COMPLETED | OUTPATIENT
Start: 2020-04-12 | End: 2020-04-12

## 2020-04-12 RX ADMIN — HEPARIN SODIUM 7500 UNIT(S): 5000 INJECTION INTRAVENOUS; SUBCUTANEOUS at 05:02

## 2020-04-12 RX ADMIN — INSULIN GLARGINE 18 UNIT(S): 100 INJECTION, SOLUTION SUBCUTANEOUS at 21:17

## 2020-04-12 RX ADMIN — HEPARIN SODIUM 7500 UNIT(S): 5000 INJECTION INTRAVENOUS; SUBCUTANEOUS at 12:30

## 2020-04-12 RX ADMIN — AMLODIPINE BESYLATE 10 MILLIGRAM(S): 2.5 TABLET ORAL at 05:02

## 2020-04-12 RX ADMIN — HEPARIN SODIUM 7500 UNIT(S): 5000 INJECTION INTRAVENOUS; SUBCUTANEOUS at 21:18

## 2020-04-12 RX ADMIN — Medication 3 UNIT(S): at 08:19

## 2020-04-12 RX ADMIN — CARVEDILOL PHOSPHATE 12.5 MILLIGRAM(S): 80 CAPSULE, EXTENDED RELEASE ORAL at 17:55

## 2020-04-12 RX ADMIN — Medication 3 UNIT(S): at 12:30

## 2020-04-12 RX ADMIN — CARVEDILOL PHOSPHATE 12.5 MILLIGRAM(S): 80 CAPSULE, EXTENDED RELEASE ORAL at 05:02

## 2020-04-12 RX ADMIN — Medication 3 UNIT(S): at 17:56

## 2020-04-12 RX ADMIN — Medication 10 MILLIGRAM(S): at 23:05

## 2020-04-12 RX ADMIN — Medication 2: at 17:56

## 2020-04-12 NOTE — PROGRESS NOTE ADULT - SUBJECTIVE AND OBJECTIVE BOX
Patient is a 59y old  Male who presents with a chief complaint of hypoxic resp failure (09 Apr 2020 14:28)      Any change in ROS: pt is doing much better:         MEDICATIONS  (STANDING):  ALBUTerol    90 MICROgram(s) HFA Inhaler 1 Puff(s) Inhalation every 4 hours  amLODIPine   Tablet 10 milliGRAM(s) Oral daily  carvedilol 12.5 milliGRAM(s) Oral every 12 hours  dextrose 5%. 1000 milliLiter(s) (50 mL/Hr) IV Continuous <Continuous>  dextrose 50% Injectable 12.5 Gram(s) IV Push once  dextrose 50% Injectable 25 Gram(s) IV Push once  dextrose 50% Injectable 25 Gram(s) IV Push once  heparin  Injectable 7500 Unit(s) SubCutaneous every 8 hours  influenza   Vaccine 0.5 milliLiter(s) IntraMuscular once  insulin glargine Injectable (LANTUS) 18 Unit(s) SubCutaneous at bedtime  insulin lispro (HumaLOG) corrective regimen sliding scale   SubCutaneous at bedtime  insulin lispro (HumaLOG) corrective regimen sliding scale   SubCutaneous three times a day before meals  insulin lispro Injectable (HumaLOG) 3 Unit(s) SubCutaneous three times a day before meals    MEDICATIONS  (PRN):  acetaminophen   Tablet .. 650 milliGRAM(s) Oral every 4 hours PRN Temp greater or equal to 38.5C (101.3F)  dextrose 40% Gel 15 Gram(s) Oral once PRN Blood Glucose LESS THAN 70 milliGRAM(s)/deciliter  glucagon  Injectable 1 milliGRAM(s) IntraMuscular once PRN Glucose LESS THAN 70 milligrams/deciliter    Vital Signs Last 24 Hrs  T(C): 37.1 (12 Apr 2020 12:29), Max: 37.2 (11 Apr 2020 17:13)  T(F): 98.8 (12 Apr 2020 12:29), Max: 98.9 (11 Apr 2020 17:13)  HR: 69 (12 Apr 2020 12:29) (69 - 87)  BP: 137/94 (12 Apr 2020 12:29) (137/94 - 149/98)  BP(mean): --  RR: 20 (12 Apr 2020 12:29) (20 - 20)  SpO2: 95% (12 Apr 2020 12:29) (84% - 95%)    I&O's Summary        Physical Exam:   GENERAL: NAD, well-groomed, well-developed  HEENT: JILL/   Atraumatic, Normocephalic  ENMT: No tonsillar erythema, exudates, or enlargement; Moist mucous membranes, Good dentition, No lesions  NECK: Supple, No JVD, Normal thyroid  CHEST/LUNG: Clear to auscultaion, ; No rales, rhonchi, wheezing, or rubs  CVS: Regular rate and rhythm; No murmurs, rubs, or gallops  GI: : Soft, Nontender, Nondistended; Bowel sounds present  NERVOUS SYSTEM:  Alert & Oriented X3  EXTREMITIES:  2+ Peripheral Pulses, No clubbing, cyanosis, or edema  LYMPH: No lymphadenopathy noted  SKIN: No rashes or lesions  ENDOCRINOLOGY: No Thyromegaly  PSYCH: Appropriate    Labs:                              13.6   5.49  )-----------( 329      ( 12 Apr 2020 06:00 )             42.8                         13.5   4.69  )-----------( 297      ( 11 Apr 2020 04:30 )             43.0                         12.5   6.39  )-----------( 316      ( 10 Apr 2020 06:10 )             39.2                         12.4   6.60  )-----------( 318      ( 09 Apr 2020 05:40 )             39.0     04-12    137  |  99  |  24<H>  ----------------------------<  146<H>  4.0   |  26  |  1.22  04-11    135  |  99  |  24<H>  ----------------------------<  134<H>  4.8   |  21<L>  |  1.02  04-10    140  |  99  |  28<H>  ----------------------------<  108<H>  3.6   |  29  |  1.12  04-09    138  |  97<L>  |  36<H>  ----------------------------<  148<H>  3.7   |  29  |  1.23    Ca    9.0      12 Apr 2020 06:57  Ca    9.0      11 Apr 2020 04:30  Phos  3.3     04-12  Phos  3.2     04-11  Mg     2.0     04-12  Mg     2.2     04-11    TPro  6.6  /  Alb  2.8<L>  /  TBili  0.4  /  DBili  x   /  AST  32  /  ALT  59<H>  /  AlkPhos  38<L>  04-12  TPro  6.6  /  Alb  3.0<L>  /  TBili  0.4  /  DBili  x   /  AST  45<H>  /  ALT  60<H>  /  AlkPhos  42  04-11  TPro  6.2  /  Alb  2.8<L>  /  TBili  0.4  /  DBili  x   /  AST  31  /  ALT  48<H>  /  AlkPhos  38<L>  04-10  TPro  6.5  /  Alb  2.8<L>  /  TBili  0.6  /  DBili  x   /  AST  28  /  ALT  41  /  AlkPhos  42  04-09    CAPILLARY BLOOD GLUCOSE      POCT Blood Glucose.: 150 mg/dL (12 Apr 2020 11:56)  POCT Blood Glucose.: 126 mg/dL (12 Apr 2020 07:29)  POCT Blood Glucose.: 191 mg/dL (11 Apr 2020 21:10)  POCT Blood Glucose.: 142 mg/dL (11 Apr 2020 16:48)      LIVER FUNCTIONS - ( 12 Apr 2020 06:57 )  Alb: 2.8 g/dL / Pro: 6.6 g/dL / ALK PHOS: 38 u/L / ALT: 59 u/L / AST: 32 u/L / GGT: x               D-Dimer Assay, Quantitative: 3054 ng/mL (04-10 @ 06:10)  D-Dimer Assay, Quantitative: 3153 ng/mL (04-09 @ 05:40)  D-Dimer Assay, Quantitative: 3911 ng/mL (04-05 @ 15:38)  Procalcitonin, Serum: 0.08 ng/mL (04-12 @ 06:57)  Procalcitonin, Serum: 0.16 ng/mL (04-09 @ 05:40)        RECENT CULTURES:  < from: Xray Chest 1 View- PORTABLE-Urgent (03.31.20 @ 02:54) >    EXAM:  XR CHEST PORTABLE URGENT 1V        PROCEDURE DATE:  Mar 31 2020         INTERPRETATION:  CLINICAL INFORMATION: Fever and shortness of breath.    EXAM: 1 view of the chest.    COMPARISON: None.    FINDINGS:    Low lung volumes limiting evaluation. Patchy bilateral airspace opacities. No pneumothorax. The heart is enlarged.     IMPRESSION: Limited study but suspicion for opacities consistent with covid 19 infection.                CANDI NASH M.D., RADIOLOGY RESIDENT  This documenthas been electronically signed.  ANDREZ VERNON M.D., ATTENDING RADIOLOGIST  This document has been electronically signed. Mar 31 2020  7:09AM              < end of copied text >        RESPIRATORY CULTURES:          Studies  Chest X-RAY  CT SCAN Chest   Venous Dopplers: LE:   CT Abdomen  Others

## 2020-04-12 NOTE — PROGRESS NOTE ADULT - SUBJECTIVE AND OBJECTIVE BOX
Patient is a 59y old  Male who presents with a chief complaint of hypoxic resp failure (09 Apr 2020 14:28)    Contact:  [facility] Pager: [phone]  [employer] Pager: [phone]    SUBJECTIVE / OVERNIGHT EVENTS:  No acute events reported overnight. Pt seen and examined at bedside.     **note in progress  MEDICATIONS  (STANDING):  ALBUTerol    90 MICROgram(s) HFA Inhaler 1 Puff(s) Inhalation every 4 hours  amLODIPine   Tablet 10 milliGRAM(s) Oral daily  carvedilol 12.5 milliGRAM(s) Oral every 12 hours  dextrose 5%. 1000 milliLiter(s) (50 mL/Hr) IV Continuous <Continuous>  dextrose 50% Injectable 12.5 Gram(s) IV Push once  dextrose 50% Injectable 25 Gram(s) IV Push once  dextrose 50% Injectable 25 Gram(s) IV Push once  heparin  Injectable 7500 Unit(s) SubCutaneous every 8 hours  influenza   Vaccine 0.5 milliLiter(s) IntraMuscular once  insulin glargine Injectable (LANTUS) 18 Unit(s) SubCutaneous at bedtime  insulin lispro (HumaLOG) corrective regimen sliding scale   SubCutaneous at bedtime  insulin lispro (HumaLOG) corrective regimen sliding scale   SubCutaneous three times a day before meals  insulin lispro Injectable (HumaLOG) 3 Unit(s) SubCutaneous three times a day before meals    MEDICATIONS  (PRN):  acetaminophen   Tablet .. 650 milliGRAM(s) Oral every 4 hours PRN Temp greater or equal to 38.5C (101.3F)  dextrose 40% Gel 15 Gram(s) Oral once PRN Blood Glucose LESS THAN 70 milliGRAM(s)/deciliter  glucagon  Injectable 1 milliGRAM(s) IntraMuscular once PRN Glucose LESS THAN 70 milligrams/deciliter      Vital Signs Last 24 Hrs  T(C): 37.1 (12 Apr 2020 05:00), Max: 37.2 (11 Apr 2020 17:13)  T(F): 98.7 (12 Apr 2020 05:00), Max: 98.9 (11 Apr 2020 17:13)  HR: 87 (12 Apr 2020 05:00) (63 - 87)  BP: 149/98 (12 Apr 2020 05:00) (144/96 - 149/98)  BP(mean): --  RR: 20 (12 Apr 2020 05:00) (20 - 20)  SpO2: 95% (12 Apr 2020 05:00) (79% - 96%)    CAPILLARY BLOOD GLUCOSE      POCT Blood Glucose.: 126 mg/dL (12 Apr 2020 07:29)  POCT Blood Glucose.: 191 mg/dL (11 Apr 2020 21:10)  POCT Blood Glucose.: 142 mg/dL (11 Apr 2020 16:48)  POCT Blood Glucose.: 220 mg/dL (11 Apr 2020 12:06)    I&O's Summary      PHYSICAL EXAM:  GENERAL: NAD, well-developed  HEAD:  Atraumatic, Normocephalic  EYES: EOMI, PERRLA, conjunctiva and sclera clear  NECK: Supple, No JVD  CHEST/LUNG: Clear to auscultation bilaterally; No wheeze  HEART: Regular rate and rhythm; No murmurs, rubs, or gallops  ABDOMEN: Soft, Nontender, Nondistended; Bowel sounds present  EXTREMITIES:  2+ Peripheral Pulses, No clubbing, cyanosis, or edema  PSYCH: AAOx3  NEUROLOGY: non-focal  SKIN: No rashes or lesions    LABS:                        13.5   4.69  )-----------( 297      ( 11 Apr 2020 04:30 )             43.0     Auto Eosinophil # 0.10  / Auto Eosinophil % 2.1   / Auto Neutrophil # 2.93  / Auto Neutrophil % 62.6  / BANDS % x        04-11    135  |  99  |  24<H>  ----------------------------<  134<H>  4.8   |  21<L>  |  1.02    Ca    9.0      11 Apr 2020 04:30  Mg     2.2     04-11  Phos  3.2     04-11  TPro  6.6  /  Alb  3.0<L>  /  TBili  0.4  /  DBili  x   /  AST  45<H>  /  ALT  60<H>  /  AlkPhos  42  04-11                RESPIRATORY  VENT:    ABG:     VBG:     RADIOLOGY & ADDITIONAL TESTS:  (Imaging Personally Reviewed)    Consultant(s) Notes Reviewed:      Care Discussed with Consultants/Other Providers: Patient is a 59y old  Male who presents with a chief complaint of hypoxic resp failure (09 Apr 2020 14:28)    Contact:  [facility] Pager: [phone]  [email or web address] Pager: [phone]    SUBJECTIVE / OVERNIGHT EVENTS:  No acute events reported overnight. Pt seen and examined at bedside. Pt denies significant SOB or cough, but conveys he felt anxious and SOB when he tried to ambulate yesterday.     MEDICATIONS  (STANDING):  ALBUTerol    90 MICROgram(s) HFA Inhaler 1 Puff(s) Inhalation every 4 hours  amLODIPine   Tablet 10 milliGRAM(s) Oral daily  carvedilol 12.5 milliGRAM(s) Oral every 12 hours  dextrose 5%. 1000 milliLiter(s) (50 mL/Hr) IV Continuous <Continuous>  dextrose 50% Injectable 12.5 Gram(s) IV Push once  dextrose 50% Injectable 25 Gram(s) IV Push once  dextrose 50% Injectable 25 Gram(s) IV Push once  heparin  Injectable 7500 Unit(s) SubCutaneous every 8 hours  influenza   Vaccine 0.5 milliLiter(s) IntraMuscular once  insulin glargine Injectable (LANTUS) 18 Unit(s) SubCutaneous at bedtime  insulin lispro (HumaLOG) corrective regimen sliding scale   SubCutaneous at bedtime  insulin lispro (HumaLOG) corrective regimen sliding scale   SubCutaneous three times a day before meals  insulin lispro Injectable (HumaLOG) 3 Unit(s) SubCutaneous three times a day before meals    MEDICATIONS  (PRN):  acetaminophen   Tablet .. 650 milliGRAM(s) Oral every 4 hours PRN Temp greater or equal to 38.5C (101.3F)  dextrose 40% Gel 15 Gram(s) Oral once PRN Blood Glucose LESS THAN 70 milliGRAM(s)/deciliter  glucagon  Injectable 1 milliGRAM(s) IntraMuscular once PRN Glucose LESS THAN 70 milligrams/deciliter      Vital Signs Last 24 Hrs  T(C): 37.1 (12 Apr 2020 05:00), Max: 37.2 (11 Apr 2020 17:13)  T(F): 98.7 (12 Apr 2020 05:00), Max: 98.9 (11 Apr 2020 17:13)  HR: 87 (12 Apr 2020 05:00) (63 - 87)  BP: 149/98 (12 Apr 2020 05:00) (144/96 - 149/98)  BP(mean): --  RR: 20 (12 Apr 2020 05:00) (20 - 20)  SpO2: 95% (12 Apr 2020 05:00) (79% - 96%)    CAPILLARY BLOOD GLUCOSE      POCT Blood Glucose.: 126 mg/dL (12 Apr 2020 07:29)  POCT Blood Glucose.: 191 mg/dL (11 Apr 2020 21:10)  POCT Blood Glucose.: 142 mg/dL (11 Apr 2020 16:48)  POCT Blood Glucose.: 220 mg/dL (11 Apr 2020 12:06)    I&O's Summary      PHYSICAL EXAM:  GENERAL: NAD, well-developed  HEAD:  Atraumatic, Normocephalic  EYES: EOMI, PERRLA, conjunctiva and sclera clear  NECK: Supple, No JVD  CHEST/LUNG: course BS b/l  HEART: Regular rate and rhythm; No murmurs, rubs, or gallops  ABDOMEN: Soft, Nontender, Nondistended; Bowel sounds present  EXTREMITIES:  2+ Peripheral Pulses, No clubbing, cyanosis, or edema  PSYCH: AAOx3  NEUROLOGY: non-focal  SKIN: No rashes or lesions    LABS:                        13.5   4.69  )-----------( 297      ( 11 Apr 2020 04:30 )             43.0     Auto Eosinophil # 0.10  / Auto Eosinophil % 2.1   / Auto Neutrophil # 2.93  / Auto Neutrophil % 62.6  / BANDS % x        04-11    135  |  99  |  24<H>  ----------------------------<  134<H>  4.8   |  21<L>  |  1.02    Ca    9.0      11 Apr 2020 04:30  Mg     2.2     04-11  Phos  3.2     04-11  TPro  6.6  /  Alb  3.0<L>  /  TBili  0.4  /  DBili  x   /  AST  45<H>  /  ALT  60<H>  /  AlkPhos  42  04-11                RESPIRATORY  VENT:    ABG:     VBG:     RADIOLOGY & ADDITIONAL TESTS:  (Imaging Personally Reviewed)NA    Consultant(s) Notes Reviewed: cards, pulm    Care Discussed with Consultants/Other Providers: NA

## 2020-04-12 NOTE — PROGRESS NOTE ADULT - PROBLEM SELECTOR PLAN 1
Goal to watch off o2 today and ambulate. Minimal o2 requirements currently on 1L  s/p anakinra   c/w supportive care  titrate off O2 as tolerated.   Inflammatory markers including CRP, ferritin, and D-dimer are downtrending. Goal to watch off o2 today and ambulate. Minimal o2 requirements currently on 2L  s/p anakinra   c/w supportive care  titrate off O2 as tolerated.   Inflammatory markers including CRP, ferritin, and D-dimer are downtrending. Goal to watch off o2 today and ambulate. Minimal o2 requirements currently on 2L; was satting 86% with ambulation so holding off dispo, will attempt again tomorrow  s/p anakinra   c/w supportive care  titrate off O2 as tolerated.   Inflammatory markers including CRP, ferritin, and D-dimer are downtrending.

## 2020-04-12 NOTE — PROGRESS NOTE ADULT - ASSESSMENT
59 y /o Male  with PMH of HTN, HLD, and DM p/w fever and fatigue x 1 week found to be COVID + as of 3/31/20.     1. Acute hypoxic respiratory failure 2/2 to COVID infection  -COVID + as of 3/31/20.  -now on r/a with stable supplemental 02 sat   -s/p abx, IV steroids , anakinra  -pulm f/u , ID f/u, med f/u   -inflammatory markers trending down     2. HTN  -bp mildly elevated, can increase coreg to 25 mg BID       dvt ppx

## 2020-04-12 NOTE — PROGRESS NOTE ADULT - SUBJECTIVE AND OBJECTIVE BOX
CARDIOLOGY FOLLOW UP - Dr. Piña    CC no cp/sob  off oxygen with stable o2 sat   pt. states he feels better  spoke with both patient and family via telemed  all questions and concerns addressed        PHYSICAL EXAM:  T(C): 37.1 (04-12-20 @ 05:00), Max: 37.2 (04-11-20 @ 17:13)  HR: 87 (04-12-20 @ 05:00) (63 - 87)  BP: 149/98 (04-12-20 @ 05:00) (144/96 - 149/98)  RR: 20 (04-12-20 @ 05:00) (20 - 20)  SpO2: 95% (04-12-20 @ 05:00) (79% - 96%)  Wt(kg): --  I&O's Summary        MEDICATIONS  (STANDING):  ALBUTerol    90 MICROgram(s) HFA Inhaler 1 Puff(s) Inhalation every 4 hours  amLODIPine   Tablet 10 milliGRAM(s) Oral daily  carvedilol 12.5 milliGRAM(s) Oral every 12 hours  dextrose 5%. 1000 milliLiter(s) (50 mL/Hr) IV Continuous <Continuous>  dextrose 50% Injectable 12.5 Gram(s) IV Push once  dextrose 50% Injectable 25 Gram(s) IV Push once  dextrose 50% Injectable 25 Gram(s) IV Push once  heparin  Injectable 7500 Unit(s) SubCutaneous every 8 hours  influenza   Vaccine 0.5 milliLiter(s) IntraMuscular once  insulin glargine Injectable (LANTUS) 18 Unit(s) SubCutaneous at bedtime  insulin lispro (HumaLOG) corrective regimen sliding scale   SubCutaneous at bedtime  insulin lispro (HumaLOG) corrective regimen sliding scale   SubCutaneous three times a day before meals  insulin lispro Injectable (HumaLOG) 3 Unit(s) SubCutaneous three times a day before meals      TELEMETRY: 	    ECG:  	  RADIOLOGY:   DIAGNOSTIC TESTING:  [ ] Echocardiogram:  [ ]  Catheterization:  [ ] Stress Test:    OTHER: 	    LABS:	 	                                13.6   5.49  )-----------( 329      ( 12 Apr 2020 06:00 )             42.8     04-11    135  |  99  |  24<H>  ----------------------------<  134<H>  4.8   |  21<L>  |  1.02    Ca    9.0      11 Apr 2020 04:30  Phos  3.2     04-11  Mg     2.2     04-11    TPro  6.6  /  Alb  3.0<L>  /  TBili  0.4  /  DBili  x   /  AST  45<H>  /  ALT  60<H>  /  AlkPhos  42  04-11

## 2020-04-12 NOTE — PROGRESS NOTE ADULT - PROBLEM SELECTOR PLAN 3
d/c losartan due to [facility]  c/w carvedilol (increased due to losartan being held) c/w carvedilol  resumed lower dose of losartan since [facility] resolved as per cards recs, titrate up as tolerated

## 2020-04-12 NOTE — PROGRESS NOTE ADULT - ASSESSMENT
pt w/ symptoms c/w covid    COVID PNEUMONIA    on 13 L of nasal cannula  D5 of anakinra: bid dosing:  finished steroids as well as Plaquenil  D D WENDY: CRP AND FERRITIN ALL HIGH:  FOLLOW UP IN AM   DVT PROPAHYXLIS     4/6:  much better : 11 100% sao2  d6 of anakinra bid dosing: crp has come down as well as ferritin: d dimer is still high   dvt prophylaxis  cont to titrate down fio2    4/7: doing much better:  on 8 l : o 2 sao2 100%:   now anakinra on one a day dosing: stop after three days:  dvt prophylaxis     4/8:    on 6 L of oxygen now: doing a little better:   on anakinra q daily dosages:   todays is in [place]: would defer to primary team for management  dvt propahyxlisx:  his oxygenation seems t o be better but now heis in jace:   FOLLOW UP FERRITIN, D DIMER: AND CRP IN AM     4/9:  on 4 l of oxygen  Ankinra will finish tomorrow  jace perpri may team   d dimer is higgh but has not increased from before:  dvt propahylaxis  dw team     4/10:  suzy ok : no SOB : no cugh :  nasal cannula: 23?l  per minute:   finished rx:   on low dose nasal cannula   per primary team     4/11:    doing better:   on 1 L of oxygen    inflammatory markers down   covd rx finsihed    4/12:   doing much better:   wean off oxygen : finished rx: of covid:   ? dc planning    DM  HTN

## 2020-04-13 LAB
ALBUMIN SERPL ELPH-MCNC: 2.8 G/DL — LOW (ref 3.3–5)
ALP SERPL-CCNC: 36 U/L — LOW (ref 40–120)
ALT FLD-CCNC: 59 U/L — HIGH (ref 4–41)
ANION GAP SERPL CALC-SCNC: 12 MMO/L — SIGNIFICANT CHANGE UP (ref 7–14)
AST SERPL-CCNC: 31 U/L — SIGNIFICANT CHANGE UP (ref 4–40)
BILIRUB SERPL-MCNC: 0.3 MG/DL — SIGNIFICANT CHANGE UP (ref 0.2–1.2)
BUN SERPL-MCNC: 26 MG/DL — HIGH (ref 7–23)
CALCIUM SERPL-MCNC: 8.9 MG/DL — SIGNIFICANT CHANGE UP (ref 8.4–10.5)
CHLORIDE SERPL-SCNC: 102 MMOL/L — SIGNIFICANT CHANGE UP (ref 98–107)
CO2 SERPL-SCNC: 25 MMOL/L — SIGNIFICANT CHANGE UP (ref 22–31)
CREAT SERPL-MCNC: 1.23 MG/DL — SIGNIFICANT CHANGE UP (ref 0.5–1.3)
GLUCOSE BLDC GLUCOMTR-MCNC: 107 MG/DL — HIGH (ref 70–99)
GLUCOSE BLDC GLUCOMTR-MCNC: 153 MG/DL — HIGH (ref 70–99)
GLUCOSE BLDC GLUCOMTR-MCNC: 156 MG/DL — HIGH (ref 70–99)
GLUCOSE BLDC GLUCOMTR-MCNC: 196 MG/DL — HIGH (ref 70–99)
GLUCOSE SERPL-MCNC: 128 MG/DL — HIGH (ref 70–99)
HCT VFR BLD CALC: 38.4 % — LOW (ref 39–50)
HGB BLD-MCNC: 12.1 G/DL — LOW (ref 13–17)
MAGNESIUM SERPL-MCNC: 1.9 MG/DL — SIGNIFICANT CHANGE UP (ref 1.6–2.6)
MCHC RBC-ENTMCNC: 26.3 PG — LOW (ref 27–34)
MCHC RBC-ENTMCNC: 31.5 % — LOW (ref 32–36)
MCV RBC AUTO: 83.5 FL — SIGNIFICANT CHANGE UP (ref 80–100)
NRBC # FLD: 0 K/UL — SIGNIFICANT CHANGE UP (ref 0–0)
PHOSPHATE SERPL-MCNC: 3.5 MG/DL — SIGNIFICANT CHANGE UP (ref 2.5–4.5)
PLATELET # BLD AUTO: 308 K/UL — SIGNIFICANT CHANGE UP (ref 150–400)
PMV BLD: 10.3 FL — SIGNIFICANT CHANGE UP (ref 7–13)
POTASSIUM SERPL-MCNC: 3.8 MMOL/L — SIGNIFICANT CHANGE UP (ref 3.5–5.3)
POTASSIUM SERPL-SCNC: 3.8 MMOL/L — SIGNIFICANT CHANGE UP (ref 3.5–5.3)
PROT SERPL-MCNC: 6.2 G/DL — SIGNIFICANT CHANGE UP (ref 6–8.3)
RBC # BLD: 4.6 M/UL — SIGNIFICANT CHANGE UP (ref 4.2–5.8)
RBC # FLD: 14.7 % — HIGH (ref 10.3–14.5)
SODIUM SERPL-SCNC: 139 MMOL/L — SIGNIFICANT CHANGE UP (ref 135–145)
WBC # BLD: 5.6 K/UL — SIGNIFICANT CHANGE UP (ref 3.8–10.5)
WBC # FLD AUTO: 5.6 K/UL — SIGNIFICANT CHANGE UP (ref 3.8–10.5)

## 2020-04-13 RX ORDER — CARVEDILOL PHOSPHATE 80 MG/1
25 CAPSULE, EXTENDED RELEASE ORAL EVERY 12 HOURS
Refills: 0 | Status: DISCONTINUED | OUTPATIENT
Start: 2020-04-13 | End: 2020-04-15

## 2020-04-13 RX ADMIN — HEPARIN SODIUM 7500 UNIT(S): 5000 INJECTION INTRAVENOUS; SUBCUTANEOUS at 22:39

## 2020-04-13 RX ADMIN — CARVEDILOL PHOSPHATE 25 MILLIGRAM(S): 80 CAPSULE, EXTENDED RELEASE ORAL at 17:30

## 2020-04-13 RX ADMIN — INSULIN GLARGINE 18 UNIT(S): 100 INJECTION, SOLUTION SUBCUTANEOUS at 22:39

## 2020-04-13 RX ADMIN — HEPARIN SODIUM 7500 UNIT(S): 5000 INJECTION INTRAVENOUS; SUBCUTANEOUS at 05:20

## 2020-04-13 RX ADMIN — Medication 3 UNIT(S): at 17:26

## 2020-04-13 RX ADMIN — Medication 2: at 17:25

## 2020-04-13 RX ADMIN — Medication 3 UNIT(S): at 08:34

## 2020-04-13 RX ADMIN — HEPARIN SODIUM 7500 UNIT(S): 5000 INJECTION INTRAVENOUS; SUBCUTANEOUS at 14:29

## 2020-04-13 RX ADMIN — AMLODIPINE BESYLATE 10 MILLIGRAM(S): 2.5 TABLET ORAL at 05:19

## 2020-04-13 RX ADMIN — CARVEDILOL PHOSPHATE 12.5 MILLIGRAM(S): 80 CAPSULE, EXTENDED RELEASE ORAL at 05:20

## 2020-04-13 RX ADMIN — Medication 3 UNIT(S): at 12:15

## 2020-04-13 RX ADMIN — LOSARTAN POTASSIUM 25 MILLIGRAM(S): 100 TABLET, FILM COATED ORAL at 05:18

## 2020-04-13 RX ADMIN — Medication 2: at 12:15

## 2020-04-13 NOTE — PROGRESS NOTE ADULT - SUBJECTIVE AND OBJECTIVE BOX
CARDIOLOGY FOLLOW UP - Dr. Piña    CC no acute events       PHYSICAL EXAM:  T(C): 37 (04-13-20 @ 05:12), Max: 37.1 (04-12-20 @ 12:29)  HR: 63 (04-13-20 @ 05:12) (63 - 69)  BP: 137/80 (04-13-20 @ 05:12) (137/80 - 173/97)  RR: 18 (04-13-20 @ 05:12) (18 - 20)  SpO2: 80% (04-13-20 @ 09:35) (80% - 98%)  Wt(kg): --  I&O's Summary          MEDICATIONS  (STANDING):  ALBUTerol    90 MICROgram(s) HFA Inhaler 1 Puff(s) Inhalation every 4 hours  amLODIPine   Tablet 10 milliGRAM(s) Oral daily  carvedilol 25 milliGRAM(s) Oral every 12 hours  dextrose 5%. 1000 milliLiter(s) (50 mL/Hr) IV Continuous <Continuous>  dextrose 50% Injectable 12.5 Gram(s) IV Push once  dextrose 50% Injectable 25 Gram(s) IV Push once  dextrose 50% Injectable 25 Gram(s) IV Push once  heparin  Injectable 7500 Unit(s) SubCutaneous every 8 hours  influenza   Vaccine 0.5 milliLiter(s) IntraMuscular once  insulin glargine Injectable (LANTUS) 18 Unit(s) SubCutaneous at bedtime  insulin lispro (HumaLOG) corrective regimen sliding scale   SubCutaneous at bedtime  insulin lispro (HumaLOG) corrective regimen sliding scale   SubCutaneous three times a day before meals  insulin lispro Injectable (HumaLOG) 3 Unit(s) SubCutaneous three times a day before meals  losartan 25 milliGRAM(s) Oral daily      TELEMETRY: 	    ECG:  	  RADIOLOGY:   DIAGNOSTIC TESTING:  [ ] Echocardiogram:  [ ]  Catheterization:  [ ] Stress Test:    OTHER: 	    LABS:	 	                            12.1   5.60  )-----------( 308      ( 13 Apr 2020 06:12 )             38.4     04-13    139  |  102  |  26<H>  ----------------------------<  128<H>  3.8   |  25  |  1.23    Ca    8.9      13 Apr 2020 06:12  Phos  3.5     04-13  Mg     1.9     04-13    TPro  6.2  /  Alb  2.8<L>  /  TBili  0.3  /  DBili  x   /  AST  31  /  ALT  59<H>  /  AlkPhos  36<L>  04-13

## 2020-04-13 NOTE — PROGRESS NOTE ADULT - ASSESSMENT
59 y /o Male  with PMH of HTN, HLD, and DM p/w fever and fatigue x 1 week found to be COVID + as of 3/31/20.     1. Acute hypoxic respiratory failure 2/2 to COVID infection  -COVID + as of 3/31/20.  -on r/a with stable supplemental 02 sat , hypoxic with ambulation noted   -s/p abx, IV steroids , anakinra  -pulm f/u , ID f/u, med f/u   -inflammatory markers trending down     2. HTN  -bp improving. continue with current anti-htn meds       dvt ppx

## 2020-04-13 NOTE — PROGRESS NOTE ADULT - ASSESSMENT
pt w/ symptoms c/w covid    COVID PNEUMONIA    on 13 L of nasal cannula  D5 of anakinra: bid dosing:  finished steroids as well as Plaquenil  D D WENDY: CRP AND FERRITIN ALL HIGH:  FOLLOW UP IN AM   DVT PROPAHYXLIS     4/6:  much better : 11 100% sao2  d6 of anakinra bid dosing: crp has come down as well as ferritin: d dimer is still high   dvt prophylaxis  cont to titrate down fio2    4/7: doing much better:  on 8 l : o 2 sao2 100%:   now anakinra on one a day dosing: stop after three days:  dvt prophylaxis     4/8:    on 6 L of oxygen now: doing a little better:   on anakinra q daily dosages:   todays is in [place]: would defer to primary team for management  dvt propahyxlisx:  his oxygenation seems t o be better but now heis in jace:   FOLLOW UP FERRITIN, D DIMER: AND CRP IN AM     4/9:  on 4 l of oxygen  Ankinra will finish tomorrow  [place] perpri may team   d dimer is higgh but has not increased from before:  dvt propahylaxis  dw team     4/10:  suzy ok : no SOB : no cugh :  nasal cannula: 23?l  per minute:   finished rx:   on low dose nasal cannula   per primary team     4/11:    doing better:   on 1 L of oxygen    inflammatory markers down   covd rx finsihed    4/12:   doing much better:   wean off oxygen : finished rx: of covid:   ? dc planning    4/13:  he desats on ambulation on room air:  rest: is o2 sao2 is pretty good:   he has imrpoed significantly from covid pneumonia ? dc planning on oxygen     DM  HTN

## 2020-04-13 NOTE — PROGRESS NOTE ADULT - SUBJECTIVE AND OBJECTIVE BOX
Dr. Murray Quiroga  Internal Medicine PGY-2  Pager# [phone]    Patient is a 59y old  Male who presents with a chief complaint of hypoxic resp failure (09 Apr 2020 14:28)      SUBJECTIVE / OVERNIGHT EVENTS: No acute events overnight. Patient was afebrile overnight. However, was hypertensive to 160s-170s.     MEDICATIONS  (STANDING):  ALBUTerol    90 MICROgram(s) HFA Inhaler 1 Puff(s) Inhalation every 4 hours  amLODIPine   Tablet 10 milliGRAM(s) Oral daily  carvedilol 12.5 milliGRAM(s) Oral every 12 hours  dextrose 5%. 1000 milliLiter(s) (50 mL/Hr) IV Continuous <Continuous>  dextrose 50% Injectable 12.5 Gram(s) IV Push once  dextrose 50% Injectable 25 Gram(s) IV Push once  dextrose 50% Injectable 25 Gram(s) IV Push once  heparin  Injectable 7500 Unit(s) SubCutaneous every 8 hours  influenza   Vaccine 0.5 milliLiter(s) IntraMuscular once  insulin glargine Injectable (LANTUS) 18 Unit(s) SubCutaneous at bedtime  insulin lispro (HumaLOG) corrective regimen sliding scale   SubCutaneous at bedtime  insulin lispro (HumaLOG) corrective regimen sliding scale   SubCutaneous three times a day before meals  insulin lispro Injectable (HumaLOG) 3 Unit(s) SubCutaneous three times a day before meals  losartan 25 milliGRAM(s) Oral daily    MEDICATIONS  (PRN):  acetaminophen   Tablet .. 650 milliGRAM(s) Oral every 4 hours PRN Temp greater or equal to 38.5C (101.3F)  dextrose 40% Gel 15 Gram(s) Oral once PRN Blood Glucose LESS THAN 70 milliGRAM(s)/deciliter  glucagon  Injectable 1 milliGRAM(s) IntraMuscular once PRN Glucose LESS THAN 70 milligrams/deciliter      Vital Signs Last 24 Hrs  T(C): 37 (13 Apr 2020 05:12), Max: 37.1 (12 Apr 2020 12:29)  T(F): 98.6 (13 Apr 2020 05:12), Max: 98.8 (12 Apr 2020 12:29)  HR: 63 (13 Apr 2020 05:12) (63 - 69)  BP: 137/80 (13 Apr 2020 05:12) (137/80 - 173/97)  BP(mean): --  RR: 18 (13 Apr 2020 05:12) (18 - 20)  SpO2: 97% (13 Apr 2020 05:12) (84% - 98%)  CAPILLARY BLOOD GLUCOSE      POCT Blood Glucose.: 217 mg/dL (12 Apr 2020 21:12)  POCT Blood Glucose.: 167 mg/dL (12 Apr 2020 17:31)  POCT Blood Glucose.: 150 mg/dL (12 Apr 2020 11:56)  POCT Blood Glucose.: 126 mg/dL (12 Apr 2020 07:29)    I&O's Summary    PHYSICAL EXAM:  GENERAL: NAD, well-developed  HEAD:  Atraumatic, Normocephalic  EYES: EOMI, PERRLA, conjunctiva and sclera clear  NECK: Supple, No JVD  CHEST/LUNG: course BS b/l  HEART: Regular rate and rhythm; No murmurs, rubs, or gallops  ABDOMEN: Soft, Nontender, Nondistended; Bowel sounds present  EXTREMITIES:  2+ Peripheral Pulses, No clubbing, cyanosis, or edema  PSYCH: AAOx3  NEUROLOGY: non-focal  SKIN: No rashes or lesions    LABS:                        13.6   5.49  )-----------( 329      ( 12 Apr 2020 06:00 )             42.8     04-12    137  |  99  |  24<H>  ----------------------------<  146<H>  4.0   |  26  |  1.22    Ca    9.0      12 Apr 2020 06:57  Phos  3.3     04-12  Mg     2.0     04-12    TPro  6.6  /  Alb  2.8<L>  /  TBili  0.4  /  DBili  x   /  AST  32  /  ALT  59<H>  /  AlkPhos  38<L>  04-12              RADIOLOGY & ADDITIONAL TESTS:    Imaging Personally Reviewed:    Consultant(s) Notes Reviewed:      Care Discussed with Consultants/Other Providers:

## 2020-04-13 NOTE — PROGRESS NOTE ADULT - PROBLEM SELECTOR PLAN 1
- Minimal o2 requirements currently on RA; was satting 86% with ambulation so holding off dispo  - s/p anakinra   - c/w supportive care  - titrate off O2 as tolerated.   - Inflammatory markers including CRP, ferritin, and D-dimer are downtrending.  - Goal to watch off o2 today and ambulate. - Minimal o2 requirements currently on RA; was satting 86% with ambulation so holding off dispo  - s/p anakinra   - c/w supportive care  - titrate off O2 as tolerated.   - Inflammatory markers including CRP, ferritin, and D-dimer are downtrending.  - Goal to watch off o2 today and ambulate. Satting 80% on RA on 4/13

## 2020-04-13 NOTE — PROGRESS NOTE ADULT - SUBJECTIVE AND OBJECTIVE BOX
Patient is a 59y old  Male who presents with a chief complaint of hypoxic resp failure (09 Apr 2020 14:28)      Any change in ROS: Doing mch better:  no sob      MEDICATIONS  (STANDING):  ALBUTerol    90 MICROgram(s) HFA Inhaler 1 Puff(s) Inhalation every 4 hours  amLODIPine   Tablet 10 milliGRAM(s) Oral daily  carvedilol 25 milliGRAM(s) Oral every 12 hours  dextrose 5%. 1000 milliLiter(s) (50 mL/Hr) IV Continuous <Continuous>  dextrose 50% Injectable 12.5 Gram(s) IV Push once  dextrose 50% Injectable 25 Gram(s) IV Push once  dextrose 50% Injectable 25 Gram(s) IV Push once  heparin  Injectable 7500 Unit(s) SubCutaneous every 8 hours  influenza   Vaccine 0.5 milliLiter(s) IntraMuscular once  insulin glargine Injectable (LANTUS) 18 Unit(s) SubCutaneous at bedtime  insulin lispro (HumaLOG) corrective regimen sliding scale   SubCutaneous at bedtime  insulin lispro (HumaLOG) corrective regimen sliding scale   SubCutaneous three times a day before meals  insulin lispro Injectable (HumaLOG) 3 Unit(s) SubCutaneous three times a day before meals  losartan 25 milliGRAM(s) Oral daily    MEDICATIONS  (PRN):  acetaminophen   Tablet .. 650 milliGRAM(s) Oral every 4 hours PRN Temp greater or equal to 38.5C (101.3F)  dextrose 40% Gel 15 Gram(s) Oral once PRN Blood Glucose LESS THAN 70 milliGRAM(s)/deciliter  glucagon  Injectable 1 milliGRAM(s) IntraMuscular once PRN Glucose LESS THAN 70 milligrams/deciliter    Vital Signs Last 24 Hrs  T(C): 37 (13 Apr 2020 12:50), Max: 37 (12 Apr 2020 22:05)  T(F): 98.6 (13 Apr 2020 12:50), Max: 98.6 (12 Apr 2020 22:05)  HR: 71 (13 Apr 2020 12:50) (63 - 71)  BP: 142/84 (13 Apr 2020 12:50) (137/80 - 173/97)  BP(mean): --  RR: 18 (13 Apr 2020 12:50) (18 - 20)  SpO2: 98% (13 Apr 2020 12:50) (80% - 98%)    I&O's Summary        Physical Exam:   GENERAL: NAD, well-groomed, well-developed  HEENT: JILL/   Atraumatic, Normocephalic  ENMT: No tonsillar erythema, exudates, or enlargement; Moist mucous membranes, Good dentition, No lesions  NECK: Supple, No JVD, Normal thyroid  CHEST/LUNG: Clear to auscultaion, ; No rales, rhonchi, wheezing, or rubs  CVS: Regular rate and rhythm; No murmurs, rubs, or gallops  GI: : Soft, Nontender, Nondistended; Bowel sounds present  NERVOUS SYSTEM:  Alert & Oriented X3  EXTREMITIES:  2+ Peripheral Pulses, No clubbing, cyanosis, or edema  LYMPH: No lymphadenopathy noted  SKIN: No rashes or lesions  ENDOCRINOLOGY: No Thyromegaly  PSYCH: Appropriate    Labs:                              12.1   5.60  )-----------( 308      ( 13 Apr 2020 06:12 )             38.4                         13.6   5.49  )-----------( 329      ( 12 Apr 2020 06:00 )             42.8                         13.5   4.69  )-----------( 297      ( 11 Apr 2020 04:30 )             43.0                         12.5   6.39  )-----------( 316      ( 10 Apr 2020 06:10 )             39.2     04-13    139  |  102  |  26<H>  ----------------------------<  128<H>  3.8   |  25  |  1.23  04-12    137  |  99  |  24<H>  ----------------------------<  146<H>  4.0   |  26  |  1.22  04-11    135  |  99  |  24<H>  ----------------------------<  134<H>  4.8   |  21<L>  |  1.02  04-10    140  |  99  |  28<H>  ----------------------------<  108<H>  3.6   |  29  |  1.12    Ca    8.9      13 Apr 2020 06:12  Ca    9.0      12 Apr 2020 06:57  Phos  3.5     04-13  Phos  3.3     04-12  Mg     1.9     04-13  Mg     2.0     04-12    TPro  6.2  /  Alb  2.8<L>  /  TBili  0.3  /  DBili  x   /  AST  31  /  ALT  59<H>  /  AlkPhos  36<L>  04-13  TPro  6.6  /  Alb  2.8<L>  /  TBili  0.4  /  DBili  x   /  AST  32  /  ALT  59<H>  /  AlkPhos  38<L>  04-12  TPro  6.6  /  Alb  3.0<L>  /  TBili  0.4  /  DBili  x   /  AST  45<H>  /  ALT  60<H>  /  AlkPhos  42  04-11  TPro  6.2  /  Alb  2.8<L>  /  TBili  0.4  /  DBili  x   /  AST  31  /  ALT  48<H>  /  AlkPhos  38<L>  04-10    CAPILLARY BLOOD GLUCOSE      POCT Blood Glucose.: 153 mg/dL (13 Apr 2020 11:58)  POCT Blood Glucose.: 107 mg/dL (13 Apr 2020 07:53)  POCT Blood Glucose.: 217 mg/dL (12 Apr 2020 21:12)  POCT Blood Glucose.: 167 mg/dL (12 Apr 2020 17:31)      LIVER FUNCTIONS - ( 13 Apr 2020 06:12 )  Alb: 2.8 g/dL / Pro: 6.2 g/dL / ALK PHOS: 36 u/L / ALT: 59 u/L / AST: 31 u/L / GGT: x               D-Dimer Assay, Quantitative: 3054 ng/mL (04-10 @ 06:10)  D-Dimer Assay, Quantitative: 3153 ng/mL (04-09 @ 05:40)  Procalcitonin, Serum: 0.08 ng/mL (04-12 @ 06:57)        RECENT CULTURES:  < from: Xray Chest 1 View- PORTABLE-Urgent (03.31.20 @ 02:54) >    EXAM:  XR CHEST PORTABLE URGENT 1V        PROCEDURE DATE:  Mar 31 2020         INTERPRETATION:  CLINICAL INFORMATION: Fever and shortness of breath.    EXAM: 1 view of the chest.    COMPARISON: None.    FINDINGS:    Low lung volumes limiting evaluation. Patchy bilateral airspace opacities. No pneumothorax. The heart is enlarged.     IMPRESSION: Limited study but suspicion for opacities consistent with covid 19 infection.                CANDI NASH M.D., RADIOLOGY RESIDENT  This documenthas been electronically signed.  ANDREZ VERNON M.D., ATTENDING RADIOLOGIST  This document has been electronically signed. Mar 31 2020  7:09AM                < end of copied text >        RESPIRATORY CULTURES:          Studies  Chest X-RAY  CT SCAN Chest   Venous Dopplers: LE:   CT Abdomen  Others

## 2020-04-13 NOTE — PROGRESS NOTE ADULT - ASSESSMENT
59 y /o Male  with PMH of HTN, HLD, and DM p/w fever and fatigue x 1 week found to be COVID + as of 3/31/20. Dispo pending ability to ambulate.

## 2020-04-14 LAB
ALBUMIN SERPL ELPH-MCNC: 2.8 G/DL — LOW (ref 3.3–5)
ALP SERPL-CCNC: 36 U/L — LOW (ref 40–120)
ALT FLD-CCNC: 59 U/L — HIGH (ref 4–41)
ANION GAP SERPL CALC-SCNC: 11 MMO/L — SIGNIFICANT CHANGE UP (ref 7–14)
AST SERPL-CCNC: 26 U/L — SIGNIFICANT CHANGE UP (ref 4–40)
BASOPHILS # BLD AUTO: 0.06 K/UL — SIGNIFICANT CHANGE UP (ref 0–0.2)
BASOPHILS NFR BLD AUTO: 1 % — SIGNIFICANT CHANGE UP (ref 0–2)
BILIRUB SERPL-MCNC: 0.3 MG/DL — SIGNIFICANT CHANGE UP (ref 0.2–1.2)
BUN SERPL-MCNC: 25 MG/DL — HIGH (ref 7–23)
CALCIUM SERPL-MCNC: 9 MG/DL — SIGNIFICANT CHANGE UP (ref 8.4–10.5)
CHLORIDE SERPL-SCNC: 99 MMOL/L — SIGNIFICANT CHANGE UP (ref 98–107)
CO2 SERPL-SCNC: 26 MMOL/L — SIGNIFICANT CHANGE UP (ref 22–31)
CREAT SERPL-MCNC: 1.24 MG/DL — SIGNIFICANT CHANGE UP (ref 0.5–1.3)
EOSINOPHIL # BLD AUTO: 0.14 K/UL — SIGNIFICANT CHANGE UP (ref 0–0.5)
EOSINOPHIL NFR BLD AUTO: 2.4 % — SIGNIFICANT CHANGE UP (ref 0–6)
GLUCOSE BLDC GLUCOMTR-MCNC: 115 MG/DL — HIGH (ref 70–99)
GLUCOSE BLDC GLUCOMTR-MCNC: 173 MG/DL — HIGH (ref 70–99)
GLUCOSE BLDC GLUCOMTR-MCNC: 177 MG/DL — HIGH (ref 70–99)
GLUCOSE BLDC GLUCOMTR-MCNC: 194 MG/DL — HIGH (ref 70–99)
GLUCOSE SERPL-MCNC: 102 MG/DL — HIGH (ref 70–99)
HCT VFR BLD CALC: 37.7 % — LOW (ref 39–50)
HGB BLD-MCNC: 12.1 G/DL — LOW (ref 13–17)
IMM GRANULOCYTES NFR BLD AUTO: 0.5 % — SIGNIFICANT CHANGE UP (ref 0–1.5)
LYMPHOCYTES # BLD AUTO: 1.76 K/UL — SIGNIFICANT CHANGE UP (ref 1–3.3)
LYMPHOCYTES # BLD AUTO: 30.1 % — SIGNIFICANT CHANGE UP (ref 13–44)
MAGNESIUM SERPL-MCNC: 2.1 MG/DL — SIGNIFICANT CHANGE UP (ref 1.6–2.6)
MCHC RBC-ENTMCNC: 26.8 PG — LOW (ref 27–34)
MCHC RBC-ENTMCNC: 32.1 % — SIGNIFICANT CHANGE UP (ref 32–36)
MCV RBC AUTO: 83.6 FL — SIGNIFICANT CHANGE UP (ref 80–100)
MONOCYTES # BLD AUTO: 0.64 K/UL — SIGNIFICANT CHANGE UP (ref 0–0.9)
MONOCYTES NFR BLD AUTO: 11 % — SIGNIFICANT CHANGE UP (ref 2–14)
NEUTROPHILS # BLD AUTO: 3.21 K/UL — SIGNIFICANT CHANGE UP (ref 1.8–7.4)
NEUTROPHILS NFR BLD AUTO: 55 % — SIGNIFICANT CHANGE UP (ref 43–77)
NRBC # FLD: 0 K/UL — SIGNIFICANT CHANGE UP (ref 0–0)
PHOSPHATE SERPL-MCNC: 4 MG/DL — SIGNIFICANT CHANGE UP (ref 2.5–4.5)
PLATELET # BLD AUTO: 333 K/UL — SIGNIFICANT CHANGE UP (ref 150–400)
PMV BLD: 11.1 FL — SIGNIFICANT CHANGE UP (ref 7–13)
POTASSIUM SERPL-MCNC: 3.8 MMOL/L — SIGNIFICANT CHANGE UP (ref 3.5–5.3)
POTASSIUM SERPL-SCNC: 3.8 MMOL/L — SIGNIFICANT CHANGE UP (ref 3.5–5.3)
PROT SERPL-MCNC: 6.2 G/DL — SIGNIFICANT CHANGE UP (ref 6–8.3)
RBC # BLD: 4.51 M/UL — SIGNIFICANT CHANGE UP (ref 4.2–5.8)
RBC # FLD: 14.9 % — HIGH (ref 10.3–14.5)
SODIUM SERPL-SCNC: 136 MMOL/L — SIGNIFICANT CHANGE UP (ref 135–145)
WBC # BLD: 5.84 K/UL — SIGNIFICANT CHANGE UP (ref 3.8–10.5)
WBC # FLD AUTO: 5.84 K/UL — SIGNIFICANT CHANGE UP (ref 3.8–10.5)

## 2020-04-14 RX ADMIN — HEPARIN SODIUM 7500 UNIT(S): 5000 INJECTION INTRAVENOUS; SUBCUTANEOUS at 06:03

## 2020-04-14 RX ADMIN — Medication 3 UNIT(S): at 08:19

## 2020-04-14 RX ADMIN — AMLODIPINE BESYLATE 10 MILLIGRAM(S): 2.5 TABLET ORAL at 06:03

## 2020-04-14 RX ADMIN — Medication 3 UNIT(S): at 12:08

## 2020-04-14 RX ADMIN — CARVEDILOL PHOSPHATE 25 MILLIGRAM(S): 80 CAPSULE, EXTENDED RELEASE ORAL at 17:25

## 2020-04-14 RX ADMIN — Medication 2: at 17:24

## 2020-04-14 RX ADMIN — Medication 2: at 12:08

## 2020-04-14 RX ADMIN — LOSARTAN POTASSIUM 25 MILLIGRAM(S): 100 TABLET, FILM COATED ORAL at 06:03

## 2020-04-14 RX ADMIN — INSULIN GLARGINE 18 UNIT(S): 100 INJECTION, SOLUTION SUBCUTANEOUS at 21:16

## 2020-04-14 RX ADMIN — CARVEDILOL PHOSPHATE 25 MILLIGRAM(S): 80 CAPSULE, EXTENDED RELEASE ORAL at 06:03

## 2020-04-14 RX ADMIN — HEPARIN SODIUM 7500 UNIT(S): 5000 INJECTION INTRAVENOUS; SUBCUTANEOUS at 21:16

## 2020-04-14 RX ADMIN — Medication 3 UNIT(S): at 17:24

## 2020-04-14 RX ADMIN — HEPARIN SODIUM 7500 UNIT(S): 5000 INJECTION INTRAVENOUS; SUBCUTANEOUS at 12:08

## 2020-04-14 NOTE — PROGRESS NOTE ADULT - ASSESSMENT
pt w/ symptoms c/w covid    COVID PNEUMONIA    on 13 L of nasal cannula  D5 of anakinra: bid dosing:  finished steroids as well as Plaquenil  D D WENDY: CRP AND FERRITIN ALL HIGH:  FOLLOW UP IN AM   DVT PROPAHYXLIS     4/6:  much better : 11 100% sao2  d6 of anakinra bid dosing: crp has come down as well as ferritin: d dimer is still high   dvt prophylaxis  cont to titrate down fio2    4/7: doing much better:  on 8 l : o 2 sao2 100%:   now anakinra on one a day dosing: stop after three days:  dvt prophylaxis     4/8:    on 6 L of oxygen now: doing a little better:   on anakinra q daily dosages:   todays is in JACE: would defer to primary team for management  dvt propahyxlisx:  his oxygenation seems t o be better but now heis in jace:   FOLLOW UP FERRITIN, D DIMER: AND CRP IN AM     4/9:  on 4 l of oxygen  Ankinra will finish tomorrow  jace perpri may team   d dimer is higgh but has not increased from before:  dvt propahylaxis  dw team     4/10:  dong ok : no SOB : no cugh :  nasal cannula: 23?l  per minute:   finished rx:   on low dose nasal cannula   per primary team     4/11:    doing better:   on 1 L of oxygen    inflammatory markers down   covd rx finsihed    4/12:   doing much better:   wean off oxygen : finished rx: of covid:   ? dc planning    4/13:  he desats on ambulation on room air:  rest: is o2 sao2 is pretty good:   he has improved significantly from covid pneumonia ? dc planning on oxygen     4/14:   no SOB : but he desaturated to 80%:  on room air at rest: he feels OK:   finished all the rx for covd:       DM  HTN

## 2020-04-14 NOTE — PROGRESS NOTE ADULT - SUBJECTIVE AND OBJECTIVE BOX
CARDIOLOGY FOLLOW UP - Dr. Piña    CC no acute events         PHYSICAL EXAM:  T(C): 37 (04-13-20 @ 12:50), Max: 37 (04-13-20 @ 12:50)  HR: 60 (04-14-20 @ 06:02) (60 - 71)  BP: 133/75 (04-14-20 @ 06:02) (118/91 - 160/95)  RR: 18 (04-14-20 @ 06:02) (18 - 18)  SpO2: 97% (04-14-20 @ 06:02) (97% - 100%)  Wt(kg): --  I&O's Summary          MEDICATIONS  (STANDING):  ALBUTerol    90 MICROgram(s) HFA Inhaler 1 Puff(s) Inhalation every 4 hours  amLODIPine   Tablet 10 milliGRAM(s) Oral daily  carvedilol 25 milliGRAM(s) Oral every 12 hours  dextrose 5%. 1000 milliLiter(s) (50 mL/Hr) IV Continuous <Continuous>  dextrose 50% Injectable 12.5 Gram(s) IV Push once  dextrose 50% Injectable 25 Gram(s) IV Push once  dextrose 50% Injectable 25 Gram(s) IV Push once  heparin  Injectable 7500 Unit(s) SubCutaneous every 8 hours  influenza   Vaccine 0.5 milliLiter(s) IntraMuscular once  insulin glargine Injectable (LANTUS) 18 Unit(s) SubCutaneous at bedtime  insulin lispro (HumaLOG) corrective regimen sliding scale   SubCutaneous at bedtime  insulin lispro (HumaLOG) corrective regimen sliding scale   SubCutaneous three times a day before meals  insulin lispro Injectable (HumaLOG) 3 Unit(s) SubCutaneous three times a day before meals  losartan 25 milliGRAM(s) Oral daily      TELEMETRY: 	    ECG:  	  RADIOLOGY:   DIAGNOSTIC TESTING:  [ ] Echocardiogram:  [ ]  Catheterization:  [ ] Stress Test:    OTHER: 	    LABS:	 	                            12.1   5.84  )-----------( 333      ( 14 Apr 2020 05:35 )             37.7     04-14    136  |  99  |  25<H>  ----------------------------<  102<H>  3.8   |  26  |  1.24    Ca    9.0      14 Apr 2020 05:35  Phos  4.0     04-14  Mg     2.1     04-14    TPro  6.2  /  Alb  2.8<L>  /  TBili  0.3  /  DBili  x   /  AST  26  /  ALT  59<H>  /  AlkPhos  36<L>  04-14

## 2020-04-14 NOTE — PROGRESS NOTE ADULT - SUBJECTIVE AND OBJECTIVE BOX
Patient is a 59y old  Male who presents with a chief complaint of hypoxic resp failure (09 Apr 2020 14:28)      Any change in ROS: doing ok   he desatas again today to 80% on room roya:       MEDICATIONS  (STANDING):  ALBUTerol    90 MICROgram(s) HFA Inhaler 1 Puff(s) Inhalation every 4 hours  amLODIPine   Tablet 10 milliGRAM(s) Oral daily  carvedilol 25 milliGRAM(s) Oral every 12 hours  dextrose 5%. 1000 milliLiter(s) (50 mL/Hr) IV Continuous <Continuous>  dextrose 50% Injectable 12.5 Gram(s) IV Push once  dextrose 50% Injectable 25 Gram(s) IV Push once  dextrose 50% Injectable 25 Gram(s) IV Push once  heparin  Injectable 7500 Unit(s) SubCutaneous every 8 hours  influenza   Vaccine 0.5 milliLiter(s) IntraMuscular once  insulin glargine Injectable (LANTUS) 18 Unit(s) SubCutaneous at bedtime  insulin lispro (HumaLOG) corrective regimen sliding scale   SubCutaneous at bedtime  insulin lispro (HumaLOG) corrective regimen sliding scale   SubCutaneous three times a day before meals  insulin lispro Injectable (HumaLOG) 3 Unit(s) SubCutaneous three times a day before meals  losartan 25 milliGRAM(s) Oral daily    MEDICATIONS  (PRN):  acetaminophen   Tablet .. 650 milliGRAM(s) Oral every 4 hours PRN Temp greater or equal to 38.5C (101.3F)  dextrose 40% Gel 15 Gram(s) Oral once PRN Blood Glucose LESS THAN 70 milliGRAM(s)/deciliter  glucagon  Injectable 1 milliGRAM(s) IntraMuscular once PRN Glucose LESS THAN 70 milligrams/deciliter    Vital Signs Last 24 Hrs  T(C): 37.4 (14 Apr 2020 11:31), Max: 37.4 (14 Apr 2020 11:31)  T(F): 99.3 (14 Apr 2020 11:31), Max: 99.3 (14 Apr 2020 11:31)  HR: 73 (14 Apr 2020 11:31) (60 - 73)  BP: 122/83 (14 Apr 2020 11:31) (118/91 - 160/95)  BP(mean): --  RR: 18 (14 Apr 2020 11:31) (18 - 18)  SpO2: 98% (14 Apr 2020 11:31) (97% - 100%)    I&O's Summary        Physical Exam:   GENERAL: NAD, well-groomed, well-developed  HEENT: JILL/   Atraumatic, Normocephalic  ENMT: No tonsillar erythema, exudates, or enlargement; Moist mucous membranes, Good dentition, No lesions  NECK: Supple, No JVD, Normal thyroid  CHEST/LUNG: Clear to auscultaion, ; No rales, rhonchi, wheezing, or rubs  CVS: Regular rate and rhythm; No murmurs, rubs, or gallops  GI: : Soft, Nontender, Nondistended; Bowel sounds present  NERVOUS SYSTEM:  Alert & Oriented X3  EXTREMITIES:  2+ Peripheral Pulses, No clubbing, cyanosis, or edema  LYMPH: No lymphadenopathy noted  SKIN: No rashes or lesions  ENDOCRINOLOGY: No Thyromegaly  PSYCH: Appropriate    Labs:                              12.1   5.84  )-----------( 333      ( 14 Apr 2020 05:35 )             37.7                         12.1   5.60  )-----------( 308      ( 13 Apr 2020 06:12 )             38.4                         13.6   5.49  )-----------( 329      ( 12 Apr 2020 06:00 )             42.8                         13.5   4.69  )-----------( 297      ( 11 Apr 2020 04:30 )             43.0     04-14    136  |  99  |  25<H>  ----------------------------<  102<H>  3.8   |  26  |  1.24  04-13    139  |  102  |  26<H>  ----------------------------<  128<H>  3.8   |  25  |  1.23  04-12    137  |  99  |  24<H>  ----------------------------<  146<H>  4.0   |  26  |  1.22  04-11    135  |  99  |  24<H>  ----------------------------<  134<H>  4.8   |  21<L>  |  1.02    Ca    9.0      14 Apr 2020 05:35  Ca    8.9      13 Apr 2020 06:12  Phos  4.0     04-14  Phos  3.5     04-13  Mg     2.1     04-14  Mg     1.9     04-13    TPro  6.2  /  Alb  2.8<L>  /  TBili  0.3  /  DBili  x   /  AST  26  /  ALT  59<H>  /  AlkPhos  36<L>  04-14  TPro  6.2  /  Alb  2.8<L>  /  TBili  0.3  /  DBili  x   /  AST  31  /  ALT  59<H>  /  AlkPhos  36<L>  04-13  TPro  6.6  /  Alb  2.8<L>  /  TBili  0.4  /  DBili  x   /  AST  32  /  ALT  59<H>  /  AlkPhos  38<L>  04-12  TPro  6.6  /  Alb  3.0<L>  /  TBili  0.4  /  DBili  x   /  AST  45<H>  /  ALT  60<H>  /  AlkPhos  42  04-11    CAPILLARY BLOOD GLUCOSE      POCT Blood Glucose.: 173 mg/dL (14 Apr 2020 11:58)  POCT Blood Glucose.: 115 mg/dL (14 Apr 2020 07:52)  POCT Blood Glucose.: 196 mg/dL (13 Apr 2020 22:33)  POCT Blood Glucose.: 156 mg/dL (13 Apr 2020 17:23)      LIVER FUNCTIONS - ( 14 Apr 2020 05:35 )  Alb: 2.8 g/dL / Pro: 6.2 g/dL / ALK PHOS: 36 u/L / ALT: 59 u/L / AST: 26 u/L / GGT: x               D-Dimer Assay, Quantitative: 3054 ng/mL (04-10 @ 06:10)  D-Dimer Assay, Quantitative: 3153 ng/mL (04-09 @ 05:40)  Procalcitonin, Serum: 0.08 ng/mL (04-12 @ 06:57)        RECENT CULTURES:        RESPIRATORY CULTURES:      < from: Xray Chest 1 View- PORTABLE-Urgent (03.31.20 @ 02:54) >  INTERPRETATION:  CLINICAL INFORMATION: Fever and shortness of breath.    EXAM: 1 view of the chest.    COMPARISON: None.    FINDINGS:    Low lung volumes limiting evaluation. Patchy bilateral airspace opacities. No pneumothorax. The heart is enlarged.     IMPRESSION: Limited study but suspicion for opacities consistent with covid 19 infection.                CANDI NASH M.D., RADIOLOGY RESIDENT  This documenthas been electronically signed.  ANDREZ VERNON M.D., ATTENDING RADIOLOGIST  This document has been electronically signed. Mar 31 2020  7:09AM        < end of copied text >      Studies  Chest X-RAY  CT SCAN Chest   Venous Dopplers: LE:   CT Abdomen  Others

## 2020-04-14 NOTE — PROGRESS NOTE ADULT - REASON FOR ADMISSION
COVID +
fever fatigue covid 19
covid pneumonia
covid pneumonia
hypoxic resp failure
resp failure
sob
resp failure
sob

## 2020-04-14 NOTE — PROGRESS NOTE ADULT - SUBJECTIVE AND OBJECTIVE BOX
Dr. Murray Quiroga  Internal Medicine PGY-2  Pager# [phone]    Patient is a 59y old  Male who presents with a chief complaint of hypoxic resp failure (09 Apr 2020 14:28)      SUBJECTIVE / OVERNIGHT EVENTS: There were no acute overnight events. Patient was HD stable.     MEDICATIONS  (STANDING):  ALBUTerol    90 MICROgram(s) HFA Inhaler 1 Puff(s) Inhalation every 4 hours  amLODIPine   Tablet 10 milliGRAM(s) Oral daily  carvedilol 25 milliGRAM(s) Oral every 12 hours  dextrose 5%. 1000 milliLiter(s) (50 mL/Hr) IV Continuous <Continuous>  dextrose 50% Injectable 12.5 Gram(s) IV Push once  dextrose 50% Injectable 25 Gram(s) IV Push once  dextrose 50% Injectable 25 Gram(s) IV Push once  heparin  Injectable 7500 Unit(s) SubCutaneous every 8 hours  influenza   Vaccine 0.5 milliLiter(s) IntraMuscular once  insulin glargine Injectable (LANTUS) 18 Unit(s) SubCutaneous at bedtime  insulin lispro (HumaLOG) corrective regimen sliding scale   SubCutaneous at bedtime  insulin lispro (HumaLOG) corrective regimen sliding scale   SubCutaneous three times a day before meals  insulin lispro Injectable (HumaLOG) 3 Unit(s) SubCutaneous three times a day before meals  losartan 25 milliGRAM(s) Oral daily    MEDICATIONS  (PRN):  acetaminophen   Tablet .. 650 milliGRAM(s) Oral every 4 hours PRN Temp greater or equal to 38.5C (101.3F)  dextrose 40% Gel 15 Gram(s) Oral once PRN Blood Glucose LESS THAN 70 milliGRAM(s)/deciliter  glucagon  Injectable 1 milliGRAM(s) IntraMuscular once PRN Glucose LESS THAN 70 milligrams/deciliter      Vital Signs Last 24 Hrs  T(C): 37 (13 Apr 2020 12:50), Max: 37 (13 Apr 2020 12:50)  T(F): 98.6 (13 Apr 2020 12:50), Max: 98.6 (13 Apr 2020 12:50)  HR: 60 (14 Apr 2020 06:02) (60 - 71)  BP: 133/75 (14 Apr 2020 06:02) (118/91 - 160/95)  BP(mean): --  RR: 18 (14 Apr 2020 06:02) (18 - 18)  SpO2: 97% (14 Apr 2020 06:02) (80% - 100%)  CAPILLARY BLOOD GLUCOSE      POCT Blood Glucose.: 196 mg/dL (13 Apr 2020 22:33)  POCT Blood Glucose.: 156 mg/dL (13 Apr 2020 17:23)  POCT Blood Glucose.: 153 mg/dL (13 Apr 2020 11:58)  POCT Blood Glucose.: 107 mg/dL (13 Apr 2020 07:53)    I&O's Summary      PHYSICAL EXAM:  GENERAL: NAD, well-developed  HEAD:  Atraumatic, Normocephalic  EYES: EOMI, PERRLA, conjunctiva and sclera clear  NECK: Supple, No JVD  CHEST/LUNG: Clear to auscultation bilaterally; No wheeze  HEART: Regular rate and rhythm; No murmurs, rubs, or gallops  ABDOMEN: Soft, Nontender, Nondistended; Bowel sounds present  EXTREMITIES:  2+ Peripheral Pulses, No clubbing, cyanosis, or edema  PSYCH: AAOx3  NEUROLOGY: non-focal  SKIN: No rashes or lesions    LABS:                        12.1   5.60  )-----------( 308      ( 13 Apr 2020 06:12 )             38.4     04-13    139  |  102  |  26<H>  ----------------------------<  128<H>  3.8   |  25  |  1.23    Ca    8.9      13 Apr 2020 06:12  Phos  3.5     04-13  Mg     1.9     04-13    TPro  6.2  /  Alb  2.8<L>  /  TBili  0.3  /  DBili  x   /  AST  31  /  ALT  59<H>  /  AlkPhos  36<L>  04-13              RADIOLOGY & ADDITIONAL TESTS:    Imaging Personally Reviewed:    Consultant(s) Notes Reviewed:      Care Discussed with Consultants/Other Providers: Dr. Murray Quiroga  Internal Medicine PGY-2  Pager# [phone]    Patient is a 59y old  Male who presents with a chief complaint of hypoxic resp failure (09 Apr 2020 14:28)      SUBJECTIVE / OVERNIGHT EVENTS: There were no acute overnight events. Patient was HD stable. No SOB and cough.     MEDICATIONS  (STANDING):  ALBUTerol    90 MICROgram(s) HFA Inhaler 1 Puff(s) Inhalation every 4 hours  amLODIPine   Tablet 10 milliGRAM(s) Oral daily  carvedilol 25 milliGRAM(s) Oral every 12 hours  dextrose 5%. 1000 milliLiter(s) (50 mL/Hr) IV Continuous <Continuous>  dextrose 50% Injectable 12.5 Gram(s) IV Push once  dextrose 50% Injectable 25 Gram(s) IV Push once  dextrose 50% Injectable 25 Gram(s) IV Push once  heparin  Injectable 7500 Unit(s) SubCutaneous every 8 hours  influenza   Vaccine 0.5 milliLiter(s) IntraMuscular once  insulin glargine Injectable (LANTUS) 18 Unit(s) SubCutaneous at bedtime  insulin lispro (HumaLOG) corrective regimen sliding scale   SubCutaneous at bedtime  insulin lispro (HumaLOG) corrective regimen sliding scale   SubCutaneous three times a day before meals  insulin lispro Injectable (HumaLOG) 3 Unit(s) SubCutaneous three times a day before meals  losartan 25 milliGRAM(s) Oral daily    MEDICATIONS  (PRN):  acetaminophen   Tablet .. 650 milliGRAM(s) Oral every 4 hours PRN Temp greater or equal to 38.5C (101.3F)  dextrose 40% Gel 15 Gram(s) Oral once PRN Blood Glucose LESS THAN 70 milliGRAM(s)/deciliter  glucagon  Injectable 1 milliGRAM(s) IntraMuscular once PRN Glucose LESS THAN 70 milligrams/deciliter      Vital Signs Last 24 Hrs  T(C): 37 (13 Apr 2020 12:50), Max: 37 (13 Apr 2020 12:50)  T(F): 98.6 (13 Apr 2020 12:50), Max: 98.6 (13 Apr 2020 12:50)  HR: 60 (14 Apr 2020 06:02) (60 - 71)  BP: 133/75 (14 Apr 2020 06:02) (118/91 - 160/95)  BP(mean): --  RR: 18 (14 Apr 2020 06:02) (18 - 18)  SpO2: 97% (14 Apr 2020 06:02) (80% - 100%)  CAPILLARY BLOOD GLUCOSE      POCT Blood Glucose.: 196 mg/dL (13 Apr 2020 22:33)  POCT Blood Glucose.: 156 mg/dL (13 Apr 2020 17:23)  POCT Blood Glucose.: 153 mg/dL (13 Apr 2020 11:58)  POCT Blood Glucose.: 107 mg/dL (13 Apr 2020 07:53)    I&O's Summary      PHYSICAL EXAM:  GENERAL: NAD, well-developed  HEAD:  Atraumatic, Normocephalic  EYES: EOMI, PERRLA, conjunctiva and sclera clear  NECK: Supple, No JVD  CHEST/LUNG: Clear to auscultation bilaterally; No wheeze  HEART: Regular rate and rhythm; No murmurs, rubs, or gallops  ABDOMEN: Soft, Nontender, Nondistended; Bowel sounds present  EXTREMITIES:  2+ Peripheral Pulses, No clubbing, cyanosis, or edema  PSYCH: AAOx3  NEUROLOGY: non-focal  SKIN: No rashes or lesions    LABS:                        12.1   5.60  )-----------( 308      ( 13 Apr 2020 06:12 )             38.4     04-13    139  |  102  |  26<H>  ----------------------------<  128<H>  3.8   |  25  |  1.23    Ca    8.9      13 Apr 2020 06:12  Phos  3.5     04-13  Mg     1.9     04-13    TPro  6.2  /  Alb  2.8<L>  /  TBili  0.3  /  DBili  x   /  AST  31  /  ALT  59<H>  /  AlkPhos  36<L>  04-13              RADIOLOGY & ADDITIONAL TESTS:    Imaging Personally Reviewed:    Consultant(s) Notes Reviewed:      Care Discussed with Consultants/Other Providers:

## 2020-04-14 NOTE — PROGRESS NOTE ADULT - PROBLEM SELECTOR PLAN 1
- Minimal o2 requirements currently on RA; was satting 86% with ambulation so holding off dispo  - s/p anakinra   - c/w supportive care  - titrate off O2 as tolerated.   - Inflammatory markers including CRP, ferritin, and D-dimer are downtrending.  - Goal to watch off o2 today and ambulate. Satting 80% on RA on 4/13 - Minimal o2 requirements currently on RA; was satting 86% with ambulation so holding off dispo  - s/p anakinra   - c/w supportive care  - titrate off O2 as tolerated.   - Inflammatory markers including CRP, ferritin, and D-dimer are downtrending.  - Goal to watch off o2 today and ambulate. Satting 80% on RA on 4/14

## 2020-04-15 ENCOUNTER — TRANSCRIPTION ENCOUNTER (OUTPATIENT)
Age: 60
End: 2020-04-15

## 2020-04-15 VITALS
SYSTOLIC BLOOD PRESSURE: 127 MMHG | RESPIRATION RATE: 18 BRPM | DIASTOLIC BLOOD PRESSURE: 91 MMHG | TEMPERATURE: 98 F | HEART RATE: 73 BPM | OXYGEN SATURATION: 95 %

## 2020-04-15 LAB
CRP SERPL-MCNC: 4.6 MG/L — SIGNIFICANT CHANGE UP
FERRITIN SERPL-MCNC: 300.1 NG/ML — SIGNIFICANT CHANGE UP (ref 30–400)
GLUCOSE BLDC GLUCOMTR-MCNC: 122 MG/DL — HIGH (ref 70–99)
GLUCOSE BLDC GLUCOMTR-MCNC: 200 MG/DL — HIGH (ref 70–99)
GLUCOSE BLDC GLUCOMTR-MCNC: 90 MG/DL — SIGNIFICANT CHANGE UP (ref 70–99)
PROCALCITONIN SERPL-MCNC: 0.08 NG/ML — SIGNIFICANT CHANGE UP (ref 0.02–0.1)

## 2020-04-15 RX ORDER — LINAGLIPTIN 5 MG/1
1 TABLET, FILM COATED ORAL
Qty: 0 | Refills: 0 | DISCHARGE

## 2020-04-15 RX ORDER — ACETAMINOPHEN 500 MG
2 TABLET ORAL
Qty: 56 | Refills: 0
Start: 2020-04-15 | End: 2020-04-21

## 2020-04-15 RX ORDER — ASPIRIN/CALCIUM CARB/MAGNESIUM 324 MG
1 TABLET ORAL
Qty: 0 | Refills: 0 | DISCHARGE

## 2020-04-15 RX ORDER — LOSARTAN/HYDROCHLOROTHIAZIDE 100MG-25MG
1 TABLET ORAL
Qty: 0 | Refills: 0 | DISCHARGE

## 2020-04-15 RX ORDER — RIVAROXABAN 15 MG-20MG
1 KIT ORAL
Qty: 30 | Refills: 0
Start: 2020-04-15 | End: 2020-05-14

## 2020-04-15 RX ORDER — METFORMIN HYDROCHLORIDE 850 MG/1
1 TABLET ORAL
Qty: 0 | Refills: 0 | DISCHARGE

## 2020-04-15 RX ORDER — ATORVASTATIN CALCIUM 80 MG/1
1 TABLET, FILM COATED ORAL
Qty: 0 | Refills: 0 | DISCHARGE

## 2020-04-15 RX ORDER — AMLODIPINE BESYLATE 2.5 MG/1
1 TABLET ORAL
Qty: 0 | Refills: 0 | DISCHARGE

## 2020-04-15 RX ADMIN — Medication 2: at 17:27

## 2020-04-15 RX ADMIN — HEPARIN SODIUM 7500 UNIT(S): 5000 INJECTION INTRAVENOUS; SUBCUTANEOUS at 15:03

## 2020-04-15 RX ADMIN — Medication 3 UNIT(S): at 17:27

## 2020-04-15 RX ADMIN — HEPARIN SODIUM 7500 UNIT(S): 5000 INJECTION INTRAVENOUS; SUBCUTANEOUS at 06:21

## 2020-04-15 RX ADMIN — AMLODIPINE BESYLATE 10 MILLIGRAM(S): 2.5 TABLET ORAL at 06:21

## 2020-04-15 RX ADMIN — LOSARTAN POTASSIUM 25 MILLIGRAM(S): 100 TABLET, FILM COATED ORAL at 06:21

## 2020-04-15 RX ADMIN — CARVEDILOL PHOSPHATE 25 MILLIGRAM(S): 80 CAPSULE, EXTENDED RELEASE ORAL at 17:27

## 2020-04-15 RX ADMIN — Medication 3 UNIT(S): at 07:54

## 2020-04-15 RX ADMIN — Medication 3 UNIT(S): at 12:17

## 2020-04-15 RX ADMIN — CARVEDILOL PHOSPHATE 25 MILLIGRAM(S): 80 CAPSULE, EXTENDED RELEASE ORAL at 06:21

## 2020-04-15 NOTE — PROGRESS NOTE ADULT - SUBJECTIVE AND OBJECTIVE BOX
Patient is a 59y old  Male who presents with a chief complaint of sob (14 Apr 2020 13:24)      Any change in ROS: he hasi mproved alot:  today he ambulated without much desaturation     MEDICATIONS  (STANDING):  ALBUTerol    90 MICROgram(s) HFA Inhaler 1 Puff(s) Inhalation every 4 hours  amLODIPine   Tablet 10 milliGRAM(s) Oral daily  carvedilol 25 milliGRAM(s) Oral every 12 hours  dextrose 5%. 1000 milliLiter(s) (50 mL/Hr) IV Continuous <Continuous>  dextrose 50% Injectable 12.5 Gram(s) IV Push once  dextrose 50% Injectable 25 Gram(s) IV Push once  dextrose 50% Injectable 25 Gram(s) IV Push once  heparin  Injectable 7500 Unit(s) SubCutaneous every 8 hours  influenza   Vaccine 0.5 milliLiter(s) IntraMuscular once  insulin glargine Injectable (LANTUS) 18 Unit(s) SubCutaneous at bedtime  insulin lispro (HumaLOG) corrective regimen sliding scale   SubCutaneous at bedtime  insulin lispro (HumaLOG) corrective regimen sliding scale   SubCutaneous three times a day before meals  insulin lispro Injectable (HumaLOG) 3 Unit(s) SubCutaneous three times a day before meals  losartan 25 milliGRAM(s) Oral daily    MEDICATIONS  (PRN):  acetaminophen   Tablet .. 650 milliGRAM(s) Oral every 4 hours PRN Temp greater or equal to 38.5C (101.3F)  dextrose 40% Gel 15 Gram(s) Oral once PRN Blood Glucose LESS THAN 70 milliGRAM(s)/deciliter  glucagon  Injectable 1 milliGRAM(s) IntraMuscular once PRN Glucose LESS THAN 70 milligrams/deciliter    Vital Signs Last 24 Hrs  T(C): 36.6 (15 Apr 2020 06:13), Max: 37 (14 Apr 2020 17:20)  T(F): 97.8 (15 Apr 2020 06:13), Max: 98.6 (14 Apr 2020 17:20)  HR: 71 (15 Apr 2020 06:13) (70 - 71)  BP: 138/84 (15 Apr 2020 06:13) (132/79 - 138/84)  BP(mean): --  RR: 18 (15 Apr 2020 06:13) (18 - 19)  SpO2: 100% (15 Apr 2020 06:13) (98% - 100%)    I&O's Summary        Physical Exam:   GENERAL: NAD, well-groomed, well-developed  HEENT: JILL/   Atraumatic, Normocephalic  ENMT: No tonsillar erythema, exudates, or enlargement; Moist mucous membranes, Good dentition, No lesions  NECK: Supple, No JVD, Normal thyroid  CHEST/LUNG: Clear to auscultaion, ; No rales, rhonchi, wheezing, or rubs  CVS: Regular rate and rhythm; No murmurs, rubs, or gallops  GI: : Soft, Nontender, Nondistended; Bowel sounds present  NERVOUS SYSTEM:  Alert & Oriented X3, Good concentration; Motor Strength 5/5 B/L upper and lower extremities; DTRs 2+ intact and symmetric  EXTREMITIES:  2+ Peripheral Pulses, No clubbing, cyanosis, or edema  LYMPH: No lymphadenopathy noted  SKIN: No rashes or lesions  ENDOCRINOLOGY: No Thyromegaly  PSYCH: Appropriate    Labs:                              12.1   5.84  )-----------( 333      ( 14 Apr 2020 05:35 )             37.7                         12.1   5.60  )-----------( 308      ( 13 Apr 2020 06:12 )             38.4                         13.6   5.49  )-----------( 329      ( 12 Apr 2020 06:00 )             42.8     04-14    136  |  99  |  25<H>  ----------------------------<  102<H>  3.8   |  26  |  1.24  04-13    139  |  102  |  26<H>  ----------------------------<  128<H>  3.8   |  25  |  1.23  04-12    137  |  99  |  24<H>  ----------------------------<  146<H>  4.0   |  26  |  1.22    Ca    9.0      14 Apr 2020 05:35  Phos  4.0     04-14  Mg     2.1     04-14    TPro  6.2  /  Alb  2.8<L>  /  TBili  0.3  /  DBili  x   /  AST  26  /  ALT  59<H>  /  AlkPhos  36<L>  04-14  TPro  6.2  /  Alb  2.8<L>  /  TBili  0.3  /  DBili  x   /  AST  31  /  ALT  59<H>  /  AlkPhos  36<L>  04-13  TPro  6.6  /  Alb  2.8<L>  /  TBili  0.4  /  DBili  x   /  AST  32  /  ALT  59<H>  /  AlkPhos  38<L>  04-12    CAPILLARY BLOOD GLUCOSE      POCT Blood Glucose.: 122 mg/dL (15 Apr 2020 12:09)  POCT Blood Glucose.: 90 mg/dL (15 Apr 2020 07:41)  POCT Blood Glucose.: 177 mg/dL (14 Apr 2020 20:47)  POCT Blood Glucose.: 194 mg/dL (14 Apr 2020 17:03)      LIVER FUNCTIONS - ( 14 Apr 2020 05:35 )  Alb: 2.8 g/dL / Pro: 6.2 g/dL / ALK PHOS: 36 u/L / ALT: 59 u/L / AST: 26 u/L / GGT: x               D-Dimer Assay, Quantitative: 3054 ng/mL (04-10 @ 06:10)  D-Dimer Assay, Quantitative: 3153 ng/mL (04-09 @ 05:40)  Procalcitonin, Serum: 0.08 ng/mL (04-15 @ 05:05)  Procalcitonin, Serum: 0.08 ng/mL (04-12 @ 06:57)        RECENT CULTURES:      < from: Xray Chest 1 View- PORTABLE-Urgent (03.31.20 @ 02:54) >      INTERPRETATION:  CLINICAL INFORMATION: Fever and shortness of breath.    EXAM: 1 view of the chest.    COMPARISON: None.    FINDINGS:    Low lung volumes limiting evaluation. Patchy bilateral airspace opacities. No pneumothorax. The heart is enlarged.     IMPRESSION: Limited study but suspicion for opacities consistent with covid 19 infection.                CANDI NASH M.D., RADIOLOGY RESIDENT  This documenthas been electronically signed.  ANDREZ VERNON M.D., ATTENDING RADIOLOGIST  This document has been electronically signed. Mar 31 2020  7:09AM              < end of copied text >    RESPIRATORY CULTURES:          Studies  Chest X-RAY  CT SCAN Chest   Venous Dopplers: LE:   CT Abdomen  Others

## 2020-04-15 NOTE — PROGRESS NOTE ADULT - PROBLEM SELECTOR PLAN 1
- Minimal o2 requirements currently on RA; was satting 86% with ambulation so holding off dispo  - s/p anakinra   - c/w supportive care  - titrate off O2 as tolerated.   - Inflammatory markers including CRP, ferritin, and D-dimer are downtrending.  - Goal to watch off o2 today and ambulate. Satting 80% on RA on 4/14

## 2020-04-15 NOTE — DISCHARGE NOTE NURSING/CASE MANAGEMENT/SOCIAL WORK - PATIENT PORTAL LINK FT
You can access the FollowMyHealth Patient Portal offered by [facility] by registering at the following website: [email or web address]. By joining [employer]’s FollowMyHealth portal, you will also be able to view your health information using other applications (apps) compatible with our system.

## 2020-04-15 NOTE — PROGRESS NOTE ADULT - SUBJECTIVE AND OBJECTIVE BOX
CARDIOLOGY FOLLOW UP - Dr. Piña    CC no acute events       PHYSICAL EXAM:  T(C): 36.6 (04-15-20 @ 06:13), Max: 37.4 (04-14-20 @ 11:31)  HR: 71 (04-15-20 @ 06:13) (70 - 73)  BP: 138/84 (04-15-20 @ 06:13) (122/83 - 138/84)  RR: 18 (04-15-20 @ 06:13) (18 - 19)  SpO2: 100% (04-15-20 @ 06:13) (98% - 100%)  Wt(kg): --  I&O's Summary          MEDICATIONS  (STANDING):  ALBUTerol    90 MICROgram(s) HFA Inhaler 1 Puff(s) Inhalation every 4 hours  amLODIPine   Tablet 10 milliGRAM(s) Oral daily  carvedilol 25 milliGRAM(s) Oral every 12 hours  dextrose 5%. 1000 milliLiter(s) (50 mL/Hr) IV Continuous <Continuous>  dextrose 50% Injectable 12.5 Gram(s) IV Push once  dextrose 50% Injectable 25 Gram(s) IV Push once  dextrose 50% Injectable 25 Gram(s) IV Push once  heparin  Injectable 7500 Unit(s) SubCutaneous every 8 hours  influenza   Vaccine 0.5 milliLiter(s) IntraMuscular once  insulin glargine Injectable (LANTUS) 18 Unit(s) SubCutaneous at bedtime  insulin lispro (HumaLOG) corrective regimen sliding scale   SubCutaneous at bedtime  insulin lispro (HumaLOG) corrective regimen sliding scale   SubCutaneous three times a day before meals  insulin lispro Injectable (HumaLOG) 3 Unit(s) SubCutaneous three times a day before meals  losartan 25 milliGRAM(s) Oral daily      TELEMETRY: 	    ECG:  	  RADIOLOGY:   DIAGNOSTIC TESTING:  [ ] Echocardiogram:  [ ]  Catheterization:  [ ] Stress Test:    OTHER: 	    LABS:	 	                                12.1   5.84  )-----------( 333      ( 14 Apr 2020 05:35 )             37.7     04-14    136  |  99  |  25<H>  ----------------------------<  102<H>  3.8   |  26  |  1.24    Ca    9.0      14 Apr 2020 05:35  Phos  4.0     04-14  Mg     2.1     04-14    TPro  6.2  /  Alb  2.8<L>  /  TBili  0.3  /  DBili  x   /  AST  26  /  ALT  59<H>  /  AlkPhos  36<L>  04-14

## 2020-04-15 NOTE — DISCHARGE NOTE PROVIDER - NSDCCPCAREPLAN_GEN_ALL_CORE_FT
PRINCIPAL DISCHARGE DIAGNOSIS  Diagnosis: COVID-19  Assessment and Plan of Treatment: :You have tested POSITIVE for the novel coronavirus (COVID-19). You were treated with plaquenil and you oxygen requirements improved.  Try to avoid contact with house members, family, and friends for the duration of your quarantine. Please follow up with your primary care physician within 1-2 weeks of your discharge. Please take all medications as prescribed. If you experience any worsening or recurrence of your symptoms, particularly worsening or high fever, shortness of breathe, extreme fatigue, or bloody cough please go to the emergency room immediately. Once discharged, you should be in quarantine until you have no fever for 3 days without using fever reducing medications and until it has been more than 7 days since your symptoms started.

## 2020-04-15 NOTE — PROGRESS NOTE ADULT - ASSESSMENT
pt w/ symptoms c/w covid    COVID PNEUMONIA    on 13 L of nasal cannula  D5 of anakinra: bid dosing:  finished steroids as well as Plaquenil  D D WENDY: CRP AND FERRITIN ALL HIGH:  FOLLOW UP IN AM   DVT PROPAHYXLIS     4/6:  much better : 11 100% sao2  d6 of anakinra bid dosing: crp has come down as well as ferritin: d dimer is still high   dvt prophylaxis  cont to titrate down fio2    4/7: doing much better:  on 8 l : o 2 sao2 100%:   now anakinra on one a day dosing: stop after three days:  dvt prophylaxis     4/8:    on 6 L of oxygen now: doing a little better:   on anakinra q daily dosages:   todays is in JACE: would defer to primary team for management  dvt propahyxlisx:  his oxygenation seems t o be better but now heis in jace:   FOLLOW UP FERRITIN, D DIMER: AND CRP IN AM     4/9:  on 4 l of oxygen  Ankinra will finish tomorrow  jace perpri may team   d dimer is higgh but has not increased from before:  dvt propahylaxis  dw team     4/10:  dong ok : no SOB : no cugh :  nasal cannula: 23?l  per minute:   finished rx:   on low dose nasal cannula   per primary team     4/11:    doing better:   on 1 L of oxygen    inflammatory markers down   covd rx finsihed    4/12:   doing much better:   wean off oxygen : finished rx: of covid:   ? dc planning    4/13:  he desats on ambulation on room air:  rest: is o2 sao2 is pretty good:   he has improved significantly from covid pneumonia ? dc planning on oxygen     4/14:   no SOB : but he desaturated to 80%:  on room air at rest: he feels OK:   finished all the rx for covd:     4/15:    HE IS DOING MUCH BETTER:  HE AMBULATED TODAY AND DID NOT DROP BELOW 92%  FOR DC?   CONT DVT PROPAHYLAXIS AS AN OUTPT      DM  HTN

## 2020-04-15 NOTE — DISCHARGE NOTE PROVIDER - HOSPITAL COURSE
59 y /o Male  with PMH of HTN, HLD, and DM p/w fever and fatigue x 1 week found to be COVID + as of 3/31/20. Pt was found to be COVID positive on PCR analysis.  Pt was treated with plaquenil and experienced decreasing O2 requirements during the hospital stay. Weaning off of O2 was performed, and the pt was found to be satting on RA at rest and on ambulation.

## 2020-04-15 NOTE — PROGRESS NOTE ADULT - NSREFPHYEXREFTO_GEN_ALL_CORE
Inpatient Physical Exam

## 2020-04-15 NOTE — DISCHARGE NOTE PROVIDER - NSDCMRMEDTOKEN_GEN_ALL_CORE_FT
amLODIPine 10 mg oral tablet: 1 tab(s) orally once a day  aspirin 81 mg oral tablet: 1 tab(s) orally once a day  atorvastatin 20 mg oral tablet: 1 tab(s) orally once a day  glipiZIDE 10 mg oral tablet, extended release: 1 tab(s) orally once a day  Hyzaar 100 mg-12.5 mg oral tablet: 1 tab(s) orally once a day  metFORMIN 1000 mg oral tablet: 1 tab(s) orally 2 times a day  Tradjenta 5 mg oral tablet: 1 tab(s) orally once a day acetaminophen 325 mg oral tablet: 2 tab(s) orally every 6 hours, As Needed -Temp greater or equal to 38.5C (101.3F)   amLODIPine 10 mg oral tablet: 1 tab(s) orally once a day  aspirin 81 mg oral tablet: 1 tab(s) orally once a day  atorvastatin 20 mg oral tablet: 1 tab(s) orally once a day  glipiZIDE 10 mg oral tablet, extended release: 1 tab(s) orally once a day  Hyzaar 100 mg-12.5 mg oral tablet: 1 tab(s) orally once a day  metFORMIN 1000 mg oral tablet: 1 tab(s) orally 2 times a day  Tradjenta 5 mg oral tablet: 1 tab(s) orally once a day  Xarelto 10 mg oral tablet: 1 tab(s) orally once a day

## 2020-04-15 NOTE — DISCHARGE NOTE PROVIDER - NSDCHC_MEDRECSTATUS_GEN_ALL_CORE
Admission Reconciliation is Completed  Discharge Reconciliation is Not Complete Admission Reconciliation is Completed  Discharge Reconciliation is Completed no

## 2020-04-15 NOTE — PROGRESS NOTE ADULT - SUBJECTIVE AND OBJECTIVE BOX
Patient is a 59y old  Male who presents with a chief complaint of sob (14 Apr 2020 13:24)    Contact:  [facility] Pager: [phone]  [employer] Pager: [phone]    SUBJECTIVE / OVERNIGHT EVENTS:  No acute events reported overnight. Pt seen and examined at bedside.     **note in progress  MEDICATIONS  (STANDING):  ALBUTerol    90 MICROgram(s) HFA Inhaler 1 Puff(s) Inhalation every 4 hours  amLODIPine   Tablet 10 milliGRAM(s) Oral daily  carvedilol 25 milliGRAM(s) Oral every 12 hours  dextrose 5%. 1000 milliLiter(s) (50 mL/Hr) IV Continuous <Continuous>  dextrose 50% Injectable 12.5 Gram(s) IV Push once  dextrose 50% Injectable 25 Gram(s) IV Push once  dextrose 50% Injectable 25 Gram(s) IV Push once  heparin  Injectable 7500 Unit(s) SubCutaneous every 8 hours  influenza   Vaccine 0.5 milliLiter(s) IntraMuscular once  insulin glargine Injectable (LANTUS) 18 Unit(s) SubCutaneous at bedtime  insulin lispro (HumaLOG) corrective regimen sliding scale   SubCutaneous at bedtime  insulin lispro (HumaLOG) corrective regimen sliding scale   SubCutaneous three times a day before meals  insulin lispro Injectable (HumaLOG) 3 Unit(s) SubCutaneous three times a day before meals  losartan 25 milliGRAM(s) Oral daily    MEDICATIONS  (PRN):  acetaminophen   Tablet .. 650 milliGRAM(s) Oral every 4 hours PRN Temp greater or equal to 38.5C (101.3F)  dextrose 40% Gel 15 Gram(s) Oral once PRN Blood Glucose LESS THAN 70 milliGRAM(s)/deciliter  glucagon  Injectable 1 milliGRAM(s) IntraMuscular once PRN Glucose LESS THAN 70 milligrams/deciliter      Vital Signs Last 24 Hrs  T(C): 36.6 (15 Apr 2020 06:13), Max: 37.4 (14 Apr 2020 11:31)  T(F): 97.8 (15 Apr 2020 06:13), Max: 99.3 (14 Apr 2020 11:31)  HR: 71 (15 Apr 2020 06:13) (70 - 73)  BP: 138/84 (15 Apr 2020 06:13) (122/83 - 138/84)  BP(mean): --  RR: 18 (15 Apr 2020 06:13) (18 - 19)  SpO2: 100% (15 Apr 2020 06:13) (98% - 100%)    CAPILLARY BLOOD GLUCOSE      POCT Blood Glucose.: 90 mg/dL (15 Apr 2020 07:41)  POCT Blood Glucose.: 177 mg/dL (14 Apr 2020 20:47)  POCT Blood Glucose.: 194 mg/dL (14 Apr 2020 17:03)  POCT Blood Glucose.: 173 mg/dL (14 Apr 2020 11:58)    I&O's Summary      PHYSICAL EXAM:  GENERAL: NAD, well-developed  HEAD:  Atraumatic, Normocephalic  EYES: EOMI, PERRLA, conjunctiva and sclera clear  NECK: Supple, No JVD  CHEST/LUNG: Clear to auscultation bilaterally; No wheeze  HEART: Regular rate and rhythm; No murmurs, rubs, or gallops  ABDOMEN: Soft, Nontender, Nondistended; Bowel sounds present  EXTREMITIES:  2+ Peripheral Pulses, No clubbing, cyanosis, or edema  PSYCH: AAOx3  NEUROLOGY: non-focal  SKIN: No rashes or lesions    LABS:                        12.1   5.84  )-----------( 333      ( 14 Apr 2020 05:35 )             37.7     Auto Eosinophil # 0.14  / Auto Eosinophil % 2.4   / Auto Neutrophil # 3.21  / Auto Neutrophil % 55.0  / BANDS % x        04-14    136  |  99  |  25<H>  ----------------------------<  102<H>  3.8   |  26  |  1.24    Ca    9.0      14 Apr 2020 05:35  Mg     2.1     04-14  Phos  4.0     04-14  TPro  6.2  /  Alb  2.8<L>  /  TBili  0.3  /  DBili  x   /  AST  26  /  ALT  59<H>  /  AlkPhos  36<L>  04-14                RESPIRATORY  VENT:    ABG:     VBG:     RADIOLOGY & ADDITIONAL TESTS:  (Imaging Personally Reviewed)    Consultant(s) Notes Reviewed:      Care Discussed with Consultants/Other Providers:

## 2020-04-15 NOTE — PROGRESS NOTE ADULT - NSREFPHYEXINPTDOCREFER_GEN_ALL_CORE
assessment per primary team, RN physical assessment reviewed
inpatient physical exam, RN assessment reviewed
medicine
internal med
internal med f/u
internal med f/u note
internal medicine
inte medicine
iNT ME
int med
primary team
team resident
internal med
primary team
m base

## 2020-04-15 NOTE — DISCHARGE NOTE PROVIDER - PROVIDER TOKENS
FREE:[LAST:[Leonard Griffith],FIRST:[Shantel],PHONE:[phone]],FAX:[(   [phone]    -],ADDRESS:[[address] [place] [address]]]

## 2021-03-08 NOTE — ED ADULT NURSE NOTE - CHIEF COMPLAINT QUOTE
No Pt arrives to ED via EMS.  Pt arrives on non rebreather on 15L Oxygen by EMS.  Pt moved to nasal cannula in ED and maintaining 95%.  Pt c/o cough and fever.  pt was tested for covid at [facility] and is awaiting results.  Pt called 911 due to SOB.

## 2024-08-28 NOTE — PROGRESS NOTE ADULT - ATTENDING COMMENTS
=
pt seen and examined  above reviewed   discussed w/ resident
Agree with above NP note.  cv stable  cont care per med/pulmo  cont cv meds  cont bb, can inc to 6.25mg bid for better bp control
Agree with above NP note.  COVID +  cont supp o2 as needed  abx per medicine  QTC ok  increase coreg to 6.25 mg Q12h  med/pulmo f/u
Agree with above NP note.  cv stable  cont care per med/pulmo  cont cv meds  c/w coreg 6.25mg bid for better bp control
Agree with above NP note.  events noted  requiring inc fio2  cont supp o2 as needed  abx per medicine  QTC ok  low dose coreg if sbp remains elevated  med/pulmo f/u
Agree with above NP note.  cv stable  cont care per med/pulmo  cont cv meds  c/w coreg
Agree with above NP note.  cv stable  cont current tx  bp improved  cont care per medicine
Agree with above NP note.  cv stable  cont current tx  bp improved  oxygen requirement improved
Agree with above NP note.  cv stable  cont current tx  med/pulmo f/u  d/c planning per medicine
Agree with above NP note.  cv stable  cont current tx  supp fio2 as needed  med/pulmo f/u
Agree with above NP note.  remains cv stable  cont current tx  can inc coreg to 25mg bid for better BP control  anakinra per medicine
Agree with above NP note.  remains cv stable  cont current tx  can inc coreg to 25mg bid for better BP control  anakinra per medicine
Agree with above NP note.  remains cv stable  improved off oxygen  cont current tx  cont coreg to 12.5mg bid   add acei/arb to optimize BP  DCP per medicine
pt seen and examined  above reviewed and agree  base
pt seen and examined  above reviewed and agree  cont to tapre o2
pt seen and examined  above reviewed and agree  hopeful d/c soon    base
pt seen and examined  above reviewed   doing well  on dec o2  \trial on r/a w/ ambulation  hopeful d/c soon
pt seen and examined  above reviewed and agree  slowly improving  cont to taper o2 as able
pt seen and examined  above reviewed    base
pt seen and examined  above reviewed and agree  c/w o2 taper   pt
Yes